# Patient Record
Sex: FEMALE | Race: WHITE | NOT HISPANIC OR LATINO | Employment: UNEMPLOYED | URBAN - NONMETROPOLITAN AREA
[De-identification: names, ages, dates, MRNs, and addresses within clinical notes are randomized per-mention and may not be internally consistent; named-entity substitution may affect disease eponyms.]

---

## 2017-12-26 ENCOUNTER — HOSPITAL ENCOUNTER (EMERGENCY)
Facility: HOSPITAL | Age: 32
Discharge: PSYCHIATRIC OR SUBSTANCE USE FACILITY | End: 2017-12-26
Attending: EMERGENCY MEDICINE | Admitting: EMERGENCY MEDICINE

## 2017-12-26 ENCOUNTER — HOSPITAL ENCOUNTER (INPATIENT)
Facility: HOSPITAL | Age: 32
LOS: 3 days | Discharge: HOME | End: 2017-12-29
Attending: PSYCHIATRY & NEUROLOGY | Admitting: PSYCHIATRY & NEUROLOGY

## 2017-12-26 ENCOUNTER — APPOINTMENT (OUTPATIENT)
Dept: GENERAL RADIOLOGY | Facility: HOSPITAL | Age: 32
End: 2017-12-26

## 2017-12-26 VITALS
HEART RATE: 100 BPM | SYSTOLIC BLOOD PRESSURE: 128 MMHG | DIASTOLIC BLOOD PRESSURE: 80 MMHG | OXYGEN SATURATION: 97 % | RESPIRATION RATE: 20 BRPM | HEIGHT: 65 IN | TEMPERATURE: 97.1 F | WEIGHT: 150 LBS | BODY MASS INDEX: 24.99 KG/M2

## 2017-12-26 DIAGNOSIS — D72.829 LEUKOCYTOSIS, UNSPECIFIED TYPE: ICD-10-CM

## 2017-12-26 DIAGNOSIS — E87.6 HYPOKALEMIA: ICD-10-CM

## 2017-12-26 DIAGNOSIS — F29 PSYCHOSIS, UNSPECIFIED PSYCHOSIS TYPE (HCC): Primary | ICD-10-CM

## 2017-12-26 PROBLEM — F22 PARANOID DELUSION: Status: ACTIVE | Noted: 2017-12-26

## 2017-12-26 LAB
6-ACETYL MORPHINE: NEGATIVE
ALBUMIN SERPL-MCNC: 4.8 G/DL (ref 3.5–5)
ALBUMIN/GLOB SERPL: 1.7 G/DL (ref 1.5–2.5)
ALP SERPL-CCNC: 80 U/L (ref 35–104)
ALT SERPL W P-5'-P-CCNC: 35 U/L (ref 10–36)
AMPHET+METHAMPHET UR QL: NEGATIVE
ANION GAP SERPL CALCULATED.3IONS-SCNC: 8.1 MMOL/L (ref 3.6–11.2)
AST SERPL-CCNC: 29 U/L (ref 10–30)
B-HCG UR QL: NEGATIVE
BACTERIA UR QL AUTO: ABNORMAL /HPF
BARBITURATES UR QL SCN: NEGATIVE
BASOPHILS # BLD AUTO: 0.02 10*3/MM3 (ref 0–0.3)
BASOPHILS NFR BLD AUTO: 0.1 % (ref 0–2)
BENZODIAZ UR QL SCN: NEGATIVE
BILIRUB SERPL-MCNC: 0.4 MG/DL (ref 0.2–1.8)
BILIRUB UR QL STRIP: NEGATIVE
BUN BLD-MCNC: 10 MG/DL (ref 7–21)
BUN/CREAT SERPL: 9.3 (ref 7–25)
BUPRENORPHINE SERPL-MCNC: NEGATIVE NG/ML
CALCIUM SPEC-SCNC: 9.5 MG/DL (ref 7.7–10)
CANNABINOIDS SERPL QL: NEGATIVE
CHLORIDE SERPL-SCNC: 113 MMOL/L (ref 99–112)
CLARITY UR: CLEAR
CO2 SERPL-SCNC: 23.9 MMOL/L (ref 24.3–31.9)
COCAINE UR QL: NEGATIVE
COLOR UR: YELLOW
CREAT BLD-MCNC: 1.07 MG/DL (ref 0.43–1.29)
DEPRECATED RDW RBC AUTO: 42.3 FL (ref 37–54)
EOSINOPHIL # BLD AUTO: 0.01 10*3/MM3 (ref 0–0.7)
EOSINOPHIL NFR BLD AUTO: 0.1 % (ref 0–5)
ERYTHROCYTE [DISTWIDTH] IN BLOOD BY AUTOMATED COUNT: 13 % (ref 11.5–14.5)
ETHANOL BLD-MCNC: <10 MG/DL
ETHANOL UR QL: <0.01 %
GFR SERPL CREATININE-BSD FRML MDRD: 59 ML/MIN/1.73
GLOBULIN UR ELPH-MCNC: 2.9 GM/DL
GLUCOSE BLD-MCNC: 106 MG/DL (ref 70–110)
GLUCOSE UR STRIP-MCNC: NEGATIVE MG/DL
HCT VFR BLD AUTO: 41.2 % (ref 37–47)
HGB BLD-MCNC: 13.7 G/DL (ref 12–16)
HGB UR QL STRIP.AUTO: ABNORMAL
HYALINE CASTS UR QL AUTO: ABNORMAL /LPF
IMM GRANULOCYTES # BLD: 0.06 10*3/MM3 (ref 0–0.03)
IMM GRANULOCYTES NFR BLD: 0.3 % (ref 0–0.5)
KETONES UR QL STRIP: NEGATIVE
LEUKOCYTE ESTERASE UR QL STRIP.AUTO: NEGATIVE
LYMPHOCYTES # BLD AUTO: 1.34 10*3/MM3 (ref 1–3)
LYMPHOCYTES NFR BLD AUTO: 7.6 % (ref 21–51)
MAGNESIUM SERPL-MCNC: 2.6 MG/DL (ref 1.7–2.6)
MCH RBC QN AUTO: 30 PG (ref 27–33)
MCHC RBC AUTO-ENTMCNC: 33.3 G/DL (ref 33–37)
MCV RBC AUTO: 90.4 FL (ref 80–94)
METHADONE UR QL SCN: NEGATIVE
MONOCYTES # BLD AUTO: 1.06 10*3/MM3 (ref 0.1–0.9)
MONOCYTES NFR BLD AUTO: 6 % (ref 0–10)
NEUTROPHILS # BLD AUTO: 15.16 10*3/MM3 (ref 1.4–6.5)
NEUTROPHILS NFR BLD AUTO: 85.9 % (ref 30–70)
NITRITE UR QL STRIP: NEGATIVE
OPIATES UR QL: NEGATIVE
OSMOLALITY SERPL CALC.SUM OF ELEC: 288.2 MOSM/KG (ref 273–305)
OXYCODONE UR QL SCN: NEGATIVE
PCP UR QL SCN: NEGATIVE
PH UR STRIP.AUTO: 7 [PH] (ref 5–8)
PLATELET # BLD AUTO: 324 10*3/MM3 (ref 130–400)
PMV BLD AUTO: 10.1 FL (ref 6–10)
POTASSIUM BLD-SCNC: 3 MMOL/L (ref 3.5–5.3)
POTASSIUM BLD-SCNC: 3.7 MMOL/L (ref 3.5–5.3)
PROT SERPL-MCNC: 7.7 G/DL (ref 6–8)
PROT UR QL STRIP: NEGATIVE
RBC # BLD AUTO: 4.56 10*6/MM3 (ref 4.2–5.4)
RBC # UR: ABNORMAL /HPF
REF LAB TEST METHOD: ABNORMAL
SODIUM BLD-SCNC: 145 MMOL/L (ref 135–153)
SP GR UR STRIP: 1.01 (ref 1–1.03)
SQUAMOUS #/AREA URNS HPF: ABNORMAL /HPF
UROBILINOGEN UR QL STRIP: ABNORMAL
WBC NRBC COR # BLD: 17.65 10*3/MM3 (ref 4.5–12.5)
WBC UR QL AUTO: ABNORMAL /HPF

## 2017-12-26 PROCEDURE — 94799 UNLISTED PULMONARY SVC/PX: CPT

## 2017-12-26 PROCEDURE — 81001 URINALYSIS AUTO W/SCOPE: CPT | Performed by: PHYSICIAN ASSISTANT

## 2017-12-26 PROCEDURE — 36415 COLL VENOUS BLD VENIPUNCTURE: CPT

## 2017-12-26 PROCEDURE — 80307 DRUG TEST PRSMV CHEM ANLYZR: CPT | Performed by: PHYSICIAN ASSISTANT

## 2017-12-26 PROCEDURE — 96372 THER/PROPH/DIAG INJ SC/IM: CPT

## 2017-12-26 PROCEDURE — 93010 ELECTROCARDIOGRAM REPORT: CPT | Performed by: INTERNAL MEDICINE

## 2017-12-26 PROCEDURE — 87040 BLOOD CULTURE FOR BACTERIA: CPT | Performed by: PHYSICIAN ASSISTANT

## 2017-12-26 PROCEDURE — 81025 URINE PREGNANCY TEST: CPT | Performed by: PHYSICIAN ASSISTANT

## 2017-12-26 PROCEDURE — 94640 AIRWAY INHALATION TREATMENT: CPT

## 2017-12-26 PROCEDURE — 80053 COMPREHEN METABOLIC PANEL: CPT | Performed by: PHYSICIAN ASSISTANT

## 2017-12-26 PROCEDURE — 71020 XR CHEST 2 VW: CPT | Performed by: RADIOLOGY

## 2017-12-26 PROCEDURE — 93005 ELECTROCARDIOGRAM TRACING: CPT | Performed by: PSYCHIATRY & NEUROLOGY

## 2017-12-26 PROCEDURE — 25010000002 ZIPRASIDONE MESYLATE PER 10 MG: Performed by: PHYSICIAN ASSISTANT

## 2017-12-26 PROCEDURE — 71020 HC CHEST PA AND LATERAL: CPT

## 2017-12-26 PROCEDURE — 84132 ASSAY OF SERUM POTASSIUM: CPT | Performed by: PHYSICIAN ASSISTANT

## 2017-12-26 PROCEDURE — 87086 URINE CULTURE/COLONY COUNT: CPT | Performed by: PHYSICIAN ASSISTANT

## 2017-12-26 PROCEDURE — 85025 COMPLETE CBC W/AUTO DIFF WBC: CPT | Performed by: PHYSICIAN ASSISTANT

## 2017-12-26 PROCEDURE — 99284 EMERGENCY DEPT VISIT MOD MDM: CPT

## 2017-12-26 PROCEDURE — 83735 ASSAY OF MAGNESIUM: CPT | Performed by: PHYSICIAN ASSISTANT

## 2017-12-26 RX ORDER — POTASSIUM CHLORIDE 20 MEQ/1
40 TABLET, EXTENDED RELEASE ORAL DAILY PRN
Status: DISCONTINUED | OUTPATIENT
Start: 2017-12-26 | End: 2017-12-29 | Stop reason: HOSPADM

## 2017-12-26 RX ORDER — LIDOCAINE 50 MG/G
1 PATCH TOPICAL EVERY 12 HOURS SCHEDULED
Status: CANCELLED | OUTPATIENT
Start: 2017-12-26

## 2017-12-26 RX ORDER — BENZONATATE 100 MG/1
100 CAPSULE ORAL 3 TIMES DAILY PRN
Status: DISCONTINUED | OUTPATIENT
Start: 2017-12-26 | End: 2017-12-29 | Stop reason: HOSPADM

## 2017-12-26 RX ORDER — OXYCODONE HYDROCHLORIDE 15 MG/1
15 TABLET ORAL EVERY 12 HOURS PRN
COMMUNITY
End: 2017-12-29 | Stop reason: HOSPADM

## 2017-12-26 RX ORDER — BENZTROPINE MESYLATE 1 MG/ML
0.5 INJECTION INTRAMUSCULAR; INTRAVENOUS DAILY PRN
Status: DISCONTINUED | OUTPATIENT
Start: 2017-12-26 | End: 2017-12-29 | Stop reason: HOSPADM

## 2017-12-26 RX ORDER — OXYCODONE HYDROCHLORIDE 15 MG/1
15 TABLET, FILM COATED, EXTENDED RELEASE ORAL EVERY 12 HOURS SCHEDULED
Status: ON HOLD | COMMUNITY
End: 2017-12-26

## 2017-12-26 RX ORDER — ALBUTEROL SULFATE 2.5 MG/3ML
2.5 SOLUTION RESPIRATORY (INHALATION) ONCE
Status: COMPLETED | OUTPATIENT
Start: 2017-12-26 | End: 2017-12-26

## 2017-12-26 RX ORDER — TOPIRAMATE 25 MG/1
50 TABLET ORAL DAILY
Status: CANCELLED | OUTPATIENT
Start: 2017-12-26

## 2017-12-26 RX ORDER — ZIPRASIDONE MESYLATE 20 MG/ML
10 INJECTION, POWDER, LYOPHILIZED, FOR SOLUTION INTRAMUSCULAR ONCE
Status: COMPLETED | OUTPATIENT
Start: 2017-12-26 | End: 2017-12-26

## 2017-12-26 RX ORDER — LIDOCAINE 50 MG/G
1 OINTMENT TOPICAL 2 TIMES DAILY PRN
COMMUNITY
End: 2017-12-29 | Stop reason: HOSPADM

## 2017-12-26 RX ORDER — WATER 1000 ML/1000ML
INJECTION, SOLUTION INTRAVENOUS
Status: COMPLETED
Start: 2017-12-26 | End: 2017-12-26

## 2017-12-26 RX ORDER — TOPIRAMATE 50 MG/1
50 TABLET, FILM COATED ORAL 2 TIMES DAILY
COMMUNITY
End: 2017-12-29 | Stop reason: HOSPADM

## 2017-12-26 RX ORDER — FAMOTIDINE 20 MG/1
20 TABLET, FILM COATED ORAL 2 TIMES DAILY PRN
Status: DISCONTINUED | OUTPATIENT
Start: 2017-12-26 | End: 2017-12-29 | Stop reason: HOSPADM

## 2017-12-26 RX ORDER — OLANZAPINE 5 MG/1
5 TABLET, ORALLY DISINTEGRATING ORAL EVERY 8 HOURS PRN
Status: DISCONTINUED | OUTPATIENT
Start: 2017-12-26 | End: 2017-12-29 | Stop reason: HOSPADM

## 2017-12-26 RX ORDER — LOPERAMIDE HYDROCHLORIDE 2 MG/1
2 CAPSULE ORAL 4 TIMES DAILY PRN
Status: DISCONTINUED | OUTPATIENT
Start: 2017-12-26 | End: 2017-12-29 | Stop reason: HOSPADM

## 2017-12-26 RX ORDER — NICOTINE 21 MG/24HR
1 PATCH, TRANSDERMAL 24 HOURS TRANSDERMAL DAILY
Status: DISCONTINUED | OUTPATIENT
Start: 2017-12-26 | End: 2017-12-29 | Stop reason: HOSPADM

## 2017-12-26 RX ORDER — TRAZODONE HYDROCHLORIDE 50 MG/1
50 TABLET ORAL NIGHTLY PRN
Status: DISCONTINUED | OUTPATIENT
Start: 2017-12-26 | End: 2017-12-29 | Stop reason: HOSPADM

## 2017-12-26 RX ORDER — CYCLOBENZAPRINE HCL 10 MG
10 TABLET ORAL 3 TIMES DAILY PRN
COMMUNITY
End: 2017-12-29 | Stop reason: HOSPADM

## 2017-12-26 RX ORDER — HYDROXYZINE 50 MG/1
50 TABLET, FILM COATED ORAL EVERY 6 HOURS PRN
Status: DISCONTINUED | OUTPATIENT
Start: 2017-12-26 | End: 2017-12-29 | Stop reason: HOSPADM

## 2017-12-26 RX ORDER — POTASSIUM CHLORIDE 20 MEQ/1
40 TABLET, EXTENDED RELEASE ORAL ONCE
Status: COMPLETED | OUTPATIENT
Start: 2017-12-26 | End: 2017-12-26

## 2017-12-26 RX ORDER — OXYCODONE HYDROCHLORIDE 15 MG/1
15 TABLET, FILM COATED, EXTENDED RELEASE ORAL EVERY 12 HOURS SCHEDULED
Status: CANCELLED | OUTPATIENT
Start: 2017-12-26

## 2017-12-26 RX ORDER — BENZTROPINE MESYLATE 1 MG/1
1 TABLET ORAL DAILY PRN
Status: DISCONTINUED | OUTPATIENT
Start: 2017-12-26 | End: 2017-12-29 | Stop reason: HOSPADM

## 2017-12-26 RX ORDER — CYCLOBENZAPRINE HCL 10 MG
10 TABLET ORAL 3 TIMES DAILY PRN
Status: CANCELLED | OUTPATIENT
Start: 2017-12-26

## 2017-12-26 RX ORDER — OXYCODONE HCL 10 MG/1
20 TABLET, FILM COATED, EXTENDED RELEASE ORAL EVERY 12 HOURS SCHEDULED
Status: CANCELLED | OUTPATIENT
Start: 2017-12-26

## 2017-12-26 RX ORDER — IBUPROFEN 600 MG/1
600 TABLET ORAL EVERY 6 HOURS PRN
Status: DISCONTINUED | OUTPATIENT
Start: 2017-12-26 | End: 2017-12-29 | Stop reason: HOSPADM

## 2017-12-26 RX ORDER — GABAPENTIN 400 MG/1
800 CAPSULE ORAL 3 TIMES DAILY PRN
Status: CANCELLED | OUTPATIENT
Start: 2017-12-26

## 2017-12-26 RX ADMIN — ZIPRASIDONE MESYLATE 10 MG: 20 INJECTION, POWDER, LYOPHILIZED, FOR SOLUTION INTRAMUSCULAR at 11:34

## 2017-12-26 RX ADMIN — WATER 1.3 ML: 1 INJECTION INTRAMUSCULAR; INTRAVENOUS; SUBCUTANEOUS at 11:34

## 2017-12-26 RX ADMIN — POTASSIUM CHLORIDE 40 MEQ: 1500 TABLET, EXTENDED RELEASE ORAL at 12:00

## 2017-12-26 RX ADMIN — NICOTINE 1 PATCH: 21 PATCH TRANSDERMAL at 14:46

## 2017-12-26 RX ADMIN — ALBUTEROL SULFATE 2.5 MG: 2.5 SOLUTION RESPIRATORY (INHALATION) at 10:21

## 2017-12-27 PROBLEM — F20.0 SCHIZOPHRENIA, PARANOID: Status: ACTIVE | Noted: 2017-12-26

## 2017-12-27 PROCEDURE — 99223 1ST HOSP IP/OBS HIGH 75: CPT | Performed by: PSYCHIATRY & NEUROLOGY

## 2017-12-27 RX ORDER — ARIPIPRAZOLE 10 MG/1
5 TABLET ORAL DAILY
Status: DISCONTINUED | OUTPATIENT
Start: 2017-12-27 | End: 2017-12-29 | Stop reason: HOSPADM

## 2017-12-27 RX ADMIN — ARIPIPRAZOLE 5 MG: 10 TABLET ORAL at 14:32

## 2017-12-27 RX ADMIN — IBUPROFEN 600 MG: 600 TABLET ORAL at 08:12

## 2017-12-27 RX ADMIN — IBUPROFEN 600 MG: 600 TABLET ORAL at 14:35

## 2017-12-27 RX ADMIN — NICOTINE 1 PATCH: 21 PATCH TRANSDERMAL at 08:12

## 2017-12-28 LAB
ALBUMIN SERPL-MCNC: 3.8 G/DL (ref 3.5–5)
ALBUMIN/GLOB SERPL: 1.5 G/DL (ref 1.5–2.5)
ALP SERPL-CCNC: 65 U/L (ref 35–104)
ALT SERPL W P-5'-P-CCNC: 25 U/L (ref 10–36)
ANION GAP SERPL CALCULATED.3IONS-SCNC: 1.9 MMOL/L (ref 3.6–11.2)
AST SERPL-CCNC: 23 U/L (ref 10–30)
BACTERIA SPEC AEROBE CULT: NORMAL
BASOPHILS # BLD AUTO: 0.02 10*3/MM3 (ref 0–0.3)
BASOPHILS NFR BLD AUTO: 0.2 % (ref 0–2)
BILIRUB SERPL-MCNC: 0.3 MG/DL (ref 0.2–1.8)
BUN BLD-MCNC: 7 MG/DL (ref 7–21)
BUN/CREAT SERPL: 10.1 (ref 7–25)
CALCIUM SPEC-SCNC: 8.9 MG/DL (ref 7.7–10)
CHLORIDE SERPL-SCNC: 113 MMOL/L (ref 99–112)
CHOLEST SERPL-MCNC: 126 MG/DL (ref 0–200)
CO2 SERPL-SCNC: 29.1 MMOL/L (ref 24.3–31.9)
CREAT BLD-MCNC: 0.69 MG/DL (ref 0.43–1.29)
DEPRECATED RDW RBC AUTO: 43.6 FL (ref 37–54)
EOSINOPHIL # BLD AUTO: 0.08 10*3/MM3 (ref 0–0.7)
EOSINOPHIL NFR BLD AUTO: 0.8 % (ref 0–5)
ERYTHROCYTE [DISTWIDTH] IN BLOOD BY AUTOMATED COUNT: 12.9 % (ref 11.5–14.5)
GFR SERPL CREATININE-BSD FRML MDRD: 97 ML/MIN/1.73
GLOBULIN UR ELPH-MCNC: 2.6 GM/DL
GLUCOSE BLD-MCNC: 96 MG/DL (ref 70–110)
HBA1C MFR BLD: 5 % (ref 4.5–5.7)
HCT VFR BLD AUTO: 37.9 % (ref 37–47)
HDLC SERPL-MCNC: 44 MG/DL (ref 60–100)
HGB BLD-MCNC: 12.2 G/DL (ref 12–16)
IMM GRANULOCYTES # BLD: 0.02 10*3/MM3 (ref 0–0.03)
IMM GRANULOCYTES NFR BLD: 0.2 % (ref 0–0.5)
LDLC SERPL CALC-MCNC: 70 MG/DL (ref 0–100)
LDLC/HDLC SERPL: 1.59 {RATIO}
LYMPHOCYTES # BLD AUTO: 2.67 10*3/MM3 (ref 1–3)
LYMPHOCYTES NFR BLD AUTO: 27.9 % (ref 21–51)
MCH RBC QN AUTO: 29.8 PG (ref 27–33)
MCHC RBC AUTO-ENTMCNC: 32.2 G/DL (ref 33–37)
MCV RBC AUTO: 92.4 FL (ref 80–94)
MONOCYTES # BLD AUTO: 0.9 10*3/MM3 (ref 0.1–0.9)
MONOCYTES NFR BLD AUTO: 9.4 % (ref 0–10)
NEUTROPHILS # BLD AUTO: 5.88 10*3/MM3 (ref 1.4–6.5)
NEUTROPHILS NFR BLD AUTO: 61.5 % (ref 30–70)
OSMOLALITY SERPL CALC.SUM OF ELEC: 284.7 MOSM/KG (ref 273–305)
PLATELET # BLD AUTO: 242 10*3/MM3 (ref 130–400)
PMV BLD AUTO: 9.9 FL (ref 6–10)
POTASSIUM BLD-SCNC: 3.8 MMOL/L (ref 3.5–5.3)
PROT SERPL-MCNC: 6.4 G/DL (ref 6–8)
RBC # BLD AUTO: 4.1 10*6/MM3 (ref 4.2–5.4)
SODIUM BLD-SCNC: 144 MMOL/L (ref 135–153)
T4 FREE SERPL-MCNC: 1.04 NG/DL (ref 0.89–1.76)
TRIGL SERPL-MCNC: 60 MG/DL (ref 0–150)
TSH SERPL DL<=0.05 MIU/L-ACNC: 0.51 MIU/ML (ref 0.55–4.78)
VLDLC SERPL-MCNC: 12 MG/DL
WBC NRBC COR # BLD: 9.57 10*3/MM3 (ref 4.5–12.5)

## 2017-12-28 PROCEDURE — 85025 COMPLETE CBC W/AUTO DIFF WBC: CPT | Performed by: PSYCHIATRY & NEUROLOGY

## 2017-12-28 PROCEDURE — 99232 SBSQ HOSP IP/OBS MODERATE 35: CPT | Performed by: PSYCHIATRY & NEUROLOGY

## 2017-12-28 PROCEDURE — 84443 ASSAY THYROID STIM HORMONE: CPT | Performed by: PSYCHIATRY & NEUROLOGY

## 2017-12-28 PROCEDURE — 83036 HEMOGLOBIN GLYCOSYLATED A1C: CPT | Performed by: PSYCHIATRY & NEUROLOGY

## 2017-12-28 PROCEDURE — 84439 ASSAY OF FREE THYROXINE: CPT | Performed by: PSYCHIATRY & NEUROLOGY

## 2017-12-28 PROCEDURE — 80061 LIPID PANEL: CPT | Performed by: PSYCHIATRY & NEUROLOGY

## 2017-12-28 PROCEDURE — 80053 COMPREHEN METABOLIC PANEL: CPT | Performed by: PSYCHIATRY & NEUROLOGY

## 2017-12-28 RX ADMIN — NICOTINE 1 PATCH: 21 PATCH TRANSDERMAL at 08:32

## 2017-12-28 RX ADMIN — IBUPROFEN 600 MG: 600 TABLET ORAL at 20:32

## 2017-12-28 RX ADMIN — ARIPIPRAZOLE 0.5 MG: 10 TABLET ORAL at 08:31

## 2017-12-28 RX ADMIN — IBUPROFEN 600 MG: 600 TABLET ORAL at 09:10

## 2017-12-29 VITALS
TEMPERATURE: 97.6 F | BODY MASS INDEX: 26.29 KG/M2 | DIASTOLIC BLOOD PRESSURE: 95 MMHG | WEIGHT: 157.8 LBS | RESPIRATION RATE: 18 BRPM | SYSTOLIC BLOOD PRESSURE: 141 MMHG | HEART RATE: 100 BPM | OXYGEN SATURATION: 98 % | HEIGHT: 65 IN

## 2017-12-29 PROCEDURE — 99239 HOSP IP/OBS DSCHRG MGMT >30: CPT | Performed by: PSYCHIATRY & NEUROLOGY

## 2017-12-29 RX ORDER — ARIPIPRAZOLE 5 MG/1
5 TABLET ORAL DAILY
Qty: 30 TABLET | Refills: 0 | Status: ON HOLD | OUTPATIENT
Start: 2017-12-30 | End: 2018-06-09

## 2017-12-29 RX ADMIN — NICOTINE 1 PATCH: 21 PATCH TRANSDERMAL at 08:13

## 2017-12-29 RX ADMIN — ARIPIPRAZOLE 5 MG: 10 TABLET ORAL at 08:12

## 2017-12-29 RX ADMIN — IBUPROFEN 600 MG: 600 TABLET ORAL at 08:15

## 2017-12-31 LAB — BACTERIA SPEC AEROBE CULT: NORMAL

## 2018-06-09 ENCOUNTER — HOSPITAL ENCOUNTER (EMERGENCY)
Facility: HOSPITAL | Age: 33
Discharge: STILL ADMITTED OR ADMINISTRATIVE | End: 2018-06-09
Attending: EMERGENCY MEDICINE

## 2018-06-09 ENCOUNTER — HOSPITAL ENCOUNTER (INPATIENT)
Facility: HOSPITAL | Age: 33
LOS: 6 days | Discharge: HOME | End: 2018-06-15
Attending: PSYCHIATRY & NEUROLOGY | Admitting: PSYCHIATRY & NEUROLOGY

## 2018-06-09 VITALS
DIASTOLIC BLOOD PRESSURE: 81 MMHG | WEIGHT: 140 LBS | RESPIRATION RATE: 18 BRPM | OXYGEN SATURATION: 100 % | HEART RATE: 95 BPM | SYSTOLIC BLOOD PRESSURE: 127 MMHG | BODY MASS INDEX: 22.5 KG/M2 | HEIGHT: 66 IN | TEMPERATURE: 99.7 F

## 2018-06-09 DIAGNOSIS — F41.1 GENERALIZED ANXIETY DISORDER: ICD-10-CM

## 2018-06-09 DIAGNOSIS — R44.3 HALLUCINATIONS: Primary | ICD-10-CM

## 2018-06-09 PROBLEM — F33.3 MDD (MAJOR DEPRESSIVE DISORDER), RECURRENT, SEVERE, WITH PSYCHOSIS: Status: ACTIVE | Noted: 2018-06-09

## 2018-06-09 LAB
6-ACETYL MORPHINE: NEGATIVE
AMPHET+METHAMPHET UR QL: NEGATIVE
B-HCG UR QL: NEGATIVE
BARBITURATES UR QL SCN: NEGATIVE
BASOPHILS # BLD AUTO: 0.02 10*3/MM3 (ref 0–0.3)
BASOPHILS NFR BLD AUTO: 0.1 % (ref 0–2)
BENZODIAZ UR QL SCN: NEGATIVE
BILIRUB UR QL STRIP: NEGATIVE
BUPRENORPHINE SERPL-MCNC: NEGATIVE NG/ML
CANNABINOIDS SERPL QL: NEGATIVE
CLARITY UR: CLEAR
COCAINE UR QL: NEGATIVE
COLOR UR: YELLOW
DEPRECATED RDW RBC AUTO: 40.5 FL (ref 37–54)
EOSINOPHIL # BLD AUTO: 0.02 10*3/MM3 (ref 0–0.7)
EOSINOPHIL NFR BLD AUTO: 0.1 % (ref 0–5)
ERYTHROCYTE [DISTWIDTH] IN BLOOD BY AUTOMATED COUNT: 12.5 % (ref 11.5–14.5)
ETHANOL BLD-MCNC: <10 MG/DL
ETHANOL UR QL: <0.01 %
GLUCOSE UR STRIP-MCNC: NEGATIVE MG/DL
HCT VFR BLD AUTO: 39.2 % (ref 37–47)
HGB BLD-MCNC: 13.3 G/DL (ref 12–16)
HGB UR QL STRIP.AUTO: NEGATIVE
IMM GRANULOCYTES # BLD: 0.04 10*3/MM3 (ref 0–0.03)
IMM GRANULOCYTES NFR BLD: 0.3 % (ref 0–0.5)
KETONES UR QL STRIP: NEGATIVE
LEUKOCYTE ESTERASE UR QL STRIP.AUTO: NEGATIVE
LYMPHOCYTES # BLD AUTO: 3.02 10*3/MM3 (ref 1–3)
LYMPHOCYTES NFR BLD AUTO: 20.5 % (ref 21–51)
MCH RBC QN AUTO: 31.1 PG (ref 27–33)
MCHC RBC AUTO-ENTMCNC: 33.9 G/DL (ref 33–37)
MCV RBC AUTO: 91.8 FL (ref 80–94)
METHADONE UR QL SCN: NEGATIVE
MONOCYTES # BLD AUTO: 1.03 10*3/MM3 (ref 0.1–0.9)
MONOCYTES NFR BLD AUTO: 7 % (ref 0–10)
NEUTROPHILS # BLD AUTO: 10.57 10*3/MM3 (ref 1.4–6.5)
NEUTROPHILS NFR BLD AUTO: 72 % (ref 30–70)
NITRITE UR QL STRIP: NEGATIVE
OPIATES UR QL: NEGATIVE
OXYCODONE UR QL SCN: POSITIVE
PCP UR QL SCN: NEGATIVE
PH UR STRIP.AUTO: 7 [PH] (ref 5–8)
PLATELET # BLD AUTO: 312 10*3/MM3 (ref 130–400)
PMV BLD AUTO: 10 FL (ref 6–10)
PROT UR QL STRIP: NEGATIVE
RBC # BLD AUTO: 4.27 10*6/MM3 (ref 4.2–5.4)
SP GR UR STRIP: <=1.005 (ref 1–1.03)
UROBILINOGEN UR QL STRIP: NORMAL
WBC NRBC COR # BLD: 14.7 10*3/MM3 (ref 4.5–12.5)

## 2018-06-09 PROCEDURE — 80307 DRUG TEST PRSMV CHEM ANLYZR: CPT | Performed by: EMERGENCY MEDICINE

## 2018-06-09 PROCEDURE — 93005 ELECTROCARDIOGRAM TRACING: CPT | Performed by: PSYCHIATRY & NEUROLOGY

## 2018-06-09 PROCEDURE — 99223 1ST HOSP IP/OBS HIGH 75: CPT | Performed by: PSYCHIATRY & NEUROLOGY

## 2018-06-09 PROCEDURE — 80307 DRUG TEST PRSMV CHEM ANLYZR: CPT | Performed by: NURSE PRACTITIONER

## 2018-06-09 PROCEDURE — 85025 COMPLETE CBC W/AUTO DIFF WBC: CPT | Performed by: NURSE PRACTITIONER

## 2018-06-09 PROCEDURE — 93010 ELECTROCARDIOGRAM REPORT: CPT | Performed by: INTERNAL MEDICINE

## 2018-06-09 PROCEDURE — 81025 URINE PREGNANCY TEST: CPT | Performed by: NURSE PRACTITIONER

## 2018-06-09 PROCEDURE — 81003 URINALYSIS AUTO W/O SCOPE: CPT | Performed by: NURSE PRACTITIONER

## 2018-06-09 RX ORDER — BENZTROPINE MESYLATE 1 MG/ML
0.5 INJECTION INTRAMUSCULAR; INTRAVENOUS DAILY PRN
Status: DISCONTINUED | OUTPATIENT
Start: 2018-06-09 | End: 2018-06-15 | Stop reason: HOSPADM

## 2018-06-09 RX ORDER — HYDROXYZINE 50 MG/1
50 TABLET, FILM COATED ORAL EVERY 6 HOURS PRN
Status: DISCONTINUED | OUTPATIENT
Start: 2018-06-09 | End: 2018-06-15 | Stop reason: HOSPADM

## 2018-06-09 RX ORDER — MIRTAZAPINE 15 MG/1
15 TABLET, FILM COATED ORAL NIGHTLY
COMMUNITY
End: 2018-06-15 | Stop reason: HOSPADM

## 2018-06-09 RX ORDER — MIRTAZAPINE 15 MG/1
15 TABLET, FILM COATED ORAL NIGHTLY
Status: DISCONTINUED | OUTPATIENT
Start: 2018-06-09 | End: 2018-06-13

## 2018-06-09 RX ORDER — TRAZODONE HYDROCHLORIDE 50 MG/1
50 TABLET ORAL NIGHTLY PRN
Status: DISCONTINUED | OUTPATIENT
Start: 2018-06-09 | End: 2018-06-15 | Stop reason: HOSPADM

## 2018-06-09 RX ORDER — TRIFLUOPERAZINE HYDROCHLORIDE 5 MG/1
5 TABLET, FILM COATED ORAL EVERY MORNING
Status: DISCONTINUED | OUTPATIENT
Start: 2018-06-10 | End: 2018-06-09

## 2018-06-09 RX ORDER — BENZTROPINE MESYLATE 1 MG/1
1 TABLET ORAL DAILY PRN
Status: DISCONTINUED | OUTPATIENT
Start: 2018-06-09 | End: 2018-06-15 | Stop reason: HOSPADM

## 2018-06-09 RX ORDER — QUETIAPINE FUMARATE 25 MG/1
25 TABLET, FILM COATED ORAL NIGHTLY
Status: DISCONTINUED | OUTPATIENT
Start: 2018-06-09 | End: 2018-06-09

## 2018-06-09 RX ORDER — OXYCODONE HYDROCHLORIDE 15 MG/1
15 TABLET ORAL EVERY 6 HOURS PRN
Status: CANCELLED | OUTPATIENT
Start: 2018-06-09

## 2018-06-09 RX ORDER — TRIFLUOPERAZINE HYDROCHLORIDE 1 MG/1
1 TABLET, FILM COATED ORAL 2 TIMES DAILY
Status: ON HOLD | COMMUNITY
End: 2018-06-09

## 2018-06-09 RX ORDER — NICOTINE 21 MG/24HR
1 PATCH, TRANSDERMAL 24 HOURS TRANSDERMAL EVERY 24 HOURS
Status: DISCONTINUED | OUTPATIENT
Start: 2018-06-09 | End: 2018-06-15 | Stop reason: HOSPADM

## 2018-06-09 RX ORDER — FAMOTIDINE 20 MG/1
20 TABLET, FILM COATED ORAL 2 TIMES DAILY PRN
Status: DISCONTINUED | OUTPATIENT
Start: 2018-06-09 | End: 2018-06-15 | Stop reason: HOSPADM

## 2018-06-09 RX ORDER — TRIFLUOPERAZINE HYDROCHLORIDE 5 MG/1
5 TABLET, FILM COATED ORAL EVERY MORNING
COMMUNITY
End: 2018-06-15 | Stop reason: HOSPADM

## 2018-06-09 RX ORDER — OXYCODONE HYDROCHLORIDE 15 MG/1
15 TABLET ORAL EVERY 6 HOURS PRN
COMMUNITY
End: 2018-06-15 | Stop reason: HOSPADM

## 2018-06-09 RX ORDER — ONDANSETRON 4 MG/1
4 TABLET, FILM COATED ORAL EVERY 6 HOURS PRN
Status: DISCONTINUED | OUTPATIENT
Start: 2018-06-09 | End: 2018-06-15 | Stop reason: HOSPADM

## 2018-06-09 RX ORDER — QUETIAPINE FUMARATE 100 MG/1
100 TABLET, FILM COATED ORAL NIGHTLY
Status: DISCONTINUED | OUTPATIENT
Start: 2018-06-09 | End: 2018-06-09

## 2018-06-09 RX ORDER — BENZONATATE 100 MG/1
100 CAPSULE ORAL 3 TIMES DAILY PRN
Status: DISCONTINUED | OUTPATIENT
Start: 2018-06-09 | End: 2018-06-15 | Stop reason: HOSPADM

## 2018-06-09 RX ORDER — ECHINACEA PURPUREA EXTRACT 125 MG
2 TABLET ORAL AS NEEDED
Status: DISCONTINUED | OUTPATIENT
Start: 2018-06-09 | End: 2018-06-15 | Stop reason: HOSPADM

## 2018-06-09 RX ORDER — ALUMINA, MAGNESIA, AND SIMETHICONE 2400; 2400; 240 MG/30ML; MG/30ML; MG/30ML
15 SUSPENSION ORAL EVERY 6 HOURS PRN
Status: DISCONTINUED | OUTPATIENT
Start: 2018-06-09 | End: 2018-06-15 | Stop reason: HOSPADM

## 2018-06-09 RX ORDER — QUETIAPINE FUMARATE 25 MG/1
25 TABLET, FILM COATED ORAL NIGHTLY
COMMUNITY
End: 2018-06-15 | Stop reason: HOSPADM

## 2018-06-09 RX ORDER — ACETAMINOPHEN 325 MG/1
650 TABLET ORAL EVERY 4 HOURS PRN
Status: DISCONTINUED | OUTPATIENT
Start: 2018-06-09 | End: 2018-06-15 | Stop reason: HOSPADM

## 2018-06-09 RX ORDER — QUETIAPINE FUMARATE 100 MG/1
100 TABLET, FILM COATED ORAL NIGHTLY
Status: DISCONTINUED | OUTPATIENT
Start: 2018-06-09 | End: 2018-06-15 | Stop reason: HOSPADM

## 2018-06-09 RX ORDER — OXYCODONE HCL 10 MG/1
20 TABLET, FILM COATED, EXTENDED RELEASE ORAL EVERY 12 HOURS SCHEDULED
Status: CANCELLED | OUTPATIENT
Start: 2018-06-09

## 2018-06-09 RX ADMIN — ACETAMINOPHEN 650 MG: 325 TABLET, FILM COATED ORAL at 20:33

## 2018-06-09 RX ADMIN — HYDROXYZINE HYDROCHLORIDE 50 MG: 50 TABLET ORAL at 09:18

## 2018-06-09 RX ADMIN — MIRTAZAPINE 15 MG: 15 TABLET, FILM COATED ORAL at 20:30

## 2018-06-09 RX ADMIN — NICOTINE 1 PATCH: 21 PATCH TRANSDERMAL at 09:18

## 2018-06-09 NOTE — PLAN OF CARE
Problem: Patient Care Overview  Goal: Plan of Care Review  Outcome: Ongoing (interventions implemented as appropriate)    Goal: Individualization and Mutuality  Outcome: Ongoing (interventions implemented as appropriate)   06/09/18 1615   Personal Strengths/Vulnerabilities   Patient Personal Strengths motivated for treatment   Patient Vulnerabilities psychosis     Goal: Discharge Needs Assessment  Outcome: Ongoing (interventions implemented as appropriate)   06/09/18 1621   Discharge Needs Assessment   Readmission Within the Last 30 Days no previous admission in last 30 days   Concerns to be Addressed cognitive/perceptual;mental health   Patient/Family Anticipates Transition to home   Patient/Family Anticipated Services at Transition none   Transportation Anticipated family or friend will provide   Patient's Choice of Community Agency(s) not consenting today   Current Discharge Risk psychiatric illness   Discharge Coordination/Progress Patient has insurance for medication and reports no issues with transportation.   Discharge Needs Assessment,    Outpatient/Agency/Support Group Needs outpatient medication management;outpatient counseling;outpatient psychiatric care (specify)   Anticipated Discharge Disposition home or self-care      DATA: Met with patient initially to complete initial assessment, social history, integrated summary, review care planning and disposition discussion. Patient is a 32 year old disabled female [place].  Patient has a history of admissions last being in December 2017.  Patient presents with psychosis, delusions and paranoia.  Patient reports a history of noncompliance with outpatient treatment.  Patient reports that her car is broke down and she does not feel safe to leave her home as she knows someone is coming into her home and moving around her belongings.  She discussed how people are being mind controlled to have a certain opinion of her so she cannot look at her  phone or facebook.  She reports it is even happening on the television.  Patient is not consenting today for family involvement or after care.    ASSESSMENT:  Patient presents with psychosis, delusions and paranoia.    Patient reports acute increase in depression and anxiety.  Patient is a danger to self and requires further hospitalization for stabilization of symptoms.    PLAN:  Patient will continue stabilization.  Patient will engage in individual and group therapy to address coping and review crisis safety planning as well as appropriate disposition.  Patient is verbalizing a plan currently to return home upon stabilization.  Patient is not consenting today for aftercare.

## 2018-06-09 NOTE — PLAN OF CARE
"Problem: Patient Care Overview  Goal: Plan of Care Review  Outcome: Ongoing (interventions implemented as appropriate)   06/1985   Coping/Psychosocial   Plan of Care Reviewed With patient   Coping/Psychosocial   Patient Agreement with Plan of Care agrees   Plan of Care Review   Progress no change   OTHER   Outcome Summary Patient was very confused and disoriented this AM during assessment. Patient smiling inappropriately at times, patient in floor this AM picking objects out of the air stating \"I know he is here, I smell him. Death is coming for me.\" Patient reoriented to surroundings but patient is extremely paranoid and suspicious of staff and other patients. Patient reports seeing and hearing the devil and reports she smeels him around her. Patient denies any thoughts to harm self or others. Patient more alert this afternoon and knew where she was but reported she is here for a panic attack. Patient was noted \"praying away the demons\" and reports \"I feel things also.\" Will continue to monitor closely.     Goal: Individualization and Mutuality  Outcome: Ongoing (interventions implemented as appropriate)    Goal: Discharge Needs Assessment  Outcome: Ongoing (interventions implemented as appropriate)    Goal: Interprofessional Rounds/Family Conf  Outcome: Ongoing (interventions implemented as appropriate)      Problem: Overarching Goals (Adult)  Goal: Adheres to Safety Considerations for Self and Others  Outcome: Ongoing (interventions implemented as appropriate)    Goal: Optimized Coping Skills in Response to Life Stressors  Outcome: Ongoing (interventions implemented as appropriate)    Goal: Develops/Participates in Therapeutic [place] to Support Successful Transition  Outcome: Ongoing (interventions implemented as appropriate)        "

## 2018-06-09 NOTE — ED NOTES
"Pt presents to the ER tearful, withdrawn, and noted to be rocking back and forth nervously. Pt family reports that Pt has not had her psych medications in one month and today Pt began having severe panic attacks. Pt presents to the ER whispering \"I'm scared,\" repeatedly. Pt family states that Pt takes remeron and stelazine and Pt did not fill this month's prescription.     Vero Trujillo RN  06/09/18 0240    "

## 2018-06-09 NOTE — ED PROVIDER NOTES
Subjective     History provided by:  Patient   used: No    Mental Health Problem   Presenting symptoms: bizarre behavior, delusional, depression, hallucinations and paranoid behavior    Degree of incapacity (severity):  Moderate  Onset quality:  Gradual  Timing:  Intermittent  Progression:  Waxing and waning  Chronicity:  Recurrent  Context: noncompliance and stressful life event    Relieved by:  Nothing  Worsened by:  Nothing  Ineffective treatments:  None tried  Associated symptoms: anxiety, feelings of worthlessness, insomnia and irritability        Review of Systems   Constitutional: Positive for irritability.   HENT: Negative.    Eyes: Negative.    Respiratory: Negative.    Endocrine: Negative.    Genitourinary: Negative.    Musculoskeletal: Negative.    Skin: Negative.    Allergic/Immunologic: Negative.    Neurological: Negative.    Hematological: Negative.    Psychiatric/Behavioral: Positive for hallucinations and paranoia. The patient is nervous/anxious and has insomnia.        Past Medical History:   Diagnosis Date   • Anxiety    • Arthritis    • Bronchitis    • Hypertension    • Panic disorder    • Psychiatric illness    • Schizoaffective disorder    • Schizophrenia, paranoid        Allergies   Allergen Reactions   • Elavil [Amitriptyline] Nausea Only       Past Surgical History:   Procedure Laterality Date   • WISDOM TOOTH EXTRACTION         Family History   Problem Relation Age of Onset   • Arthritis Mother    • Stroke Father    • COPD Brother    • Diabetes Maternal Grandmother    • COPD Maternal Grandfather    • Heart disease Maternal Grandfather    • Stroke Maternal Grandfather    • ADD / ADHD Neg Hx    • Anxiety disorder Neg Hx    • Bipolar disorder Neg Hx    • Alcohol abuse Neg Hx        Social History     Social History   • Marital status: Single     Social History Main Topics   • Smoking status: Current Every Day Smoker     Packs/day: 1.00     Types: Cigarettes   • Smokeless  "tobacco: Never Used   • Alcohol use No   • Drug use: Yes     Types: Oxycodone   • Sexual activity: No      Comment: reports she hasn't been in \"forever.\"      Other Topics Concern   • Not on file           Objective   Physical Exam   Constitutional: She is oriented to person, place, and time. She appears well-developed and well-nourished.   HENT:   Head: Normocephalic.   Eyes: EOM are normal. Pupils are equal, round, and reactive to light.   Neck: Normal range of motion. Neck supple.   Cardiovascular: Normal rate, regular rhythm, normal heart sounds and intact distal pulses.    Pulmonary/Chest: Effort normal and breath sounds normal.   Abdominal: Soft. Bowel sounds are normal.   Musculoskeletal: Normal range of motion.   Neurological: She is alert and oriented to person, place, and time.   Skin: Skin is warm and dry. Capillary refill takes less than 2 seconds.   Psychiatric:   Patient anxious and tearful, rocking back and forth on stretcher. Patient keeps repeating, \"im so scared, im so scared.\"   Nursing note and vitals reviewed.      Procedures           ED Course                  MDM  Number of Diagnoses or Management Options  Generalized anxiety disorder: new and requires workup  Hallucinations: new and requires workup     Amount and/or Complexity of Data Reviewed  Clinical lab tests: ordered and reviewed  Tests in the medicine section of CPT®: reviewed and ordered    Risk of Complications, Morbidity, and/or Mortality  Presenting problems: low  Diagnostic procedures: low  Management options: low    Patient Progress  Patient progress: stable        Final diagnoses:   Hallucinations   Generalized anxiety disorder            Andrew Minaya, APRN  06/09/18 0331    "

## 2018-06-09 NOTE — H&P
"INITIAL PSYCHIATRIC HISTORY & PHYSICAL    Patient Identification:  Name:   Cintia Issa  Age:   32 y.o.  Sex:   female  :   1985  MRN:   [id number]  Visit Number:   [id number]  Primary Care Physician:   ROBIN Hernandes    SUBJECTIVE    CC: Paranoid, delusional, bizarre behavior    HPI: Cintia Issa is a 32 y.o. female who was admitted on 2018 with complaints of bizarre, paranoid, delusional behavior.  Patient presented to [facility] displaying bizarre, paranoid and hallucinations.  Patient has history of previous inpatient psychiatric hospitalization at this facility on 2017-2017.diagnosis of Delusional disorder, r/o  Scizophrenia,unspecified, Opiate dependence ,uncomplicated,iatrogenic. Benzodiazepine dependence, uncomplicated iatrogenic, Hypertension, Chronic back pain. She also has noted history of MDD, rec, mod and reports history of fibromyalgia. Upon unit patient continues to display paranoia,  she is remains  apprehensive, restless, guarded,smiles inappropriately, displays disorganized thought and is  Preoccupied. She reports people are out to get her, feels as if people are in vents trying to look at her, attempting to get  to hurt her, and is constantly saying something is \"not right.\"  The patient is a poor historian and cannot remember when the last time she took her medicine, when asked if she took any the last month she states \"I really don't know\" patient says her sleep is very poor, she is very emotional during assessment and rates her anxiety and depression a 10 out of 10 with 10 being the worst.  Patient states that she is having visual and auditory hallucinations of angels and demons, and that she can even smell them.  Patient continues to display paranoia by swatting at the floors and staring at different parts of the walls. Patient reports lately she's experienced increased anxiety, depression, low mood, anxiety, excessive worry,  isolation,  " anhedonia, low self,  feeling on edge,  Historically, she has history of periodic “Panic” episodes including shortness of breath, fear of losing control, urged to escape the situation, and a sense of impending doom   Patient reports a long history of depression and anxiety going back to her early teenage years.She was admitted to the Adult Psychiatric Unit for safety and further stabilization.     Abnormal ECG.  Schizoaffective disorder, schizophrenia, panic disorder, anxiety    PAST PSYCHIATRIC HX: Patient has 2 other previous psychiatric admissions at [facility], with the last being on 12/27/2017-12/29/2017.  A review of the records reveals she was started on Abilify at that time. She reports she has been treated with multiple medications over the years with mixed results she denies history of suicidal ideation or suicidal behavior.  She has a history of Lexaproprescription opiate in the past and history of pain clinic  She reports a significant increase following the death of her father.     SUBSTANCE USE HX: UDS was positive for oxycodone.  Patient has a substance abuse history and no reported EtOH history.    SOCIAL HX: Patient was born and raised in [place].  She is single with no children.  Patient lives alone but recently moved in with mom this week because of her recent severe panic attacks.  She has one sibling.  She has a high school education and draws disability.    Past Medical History:   Diagnosis Date   • Anxiety    • Arthritis    • Bronchitis    • Hypertension    • MDD (major depressive disorder), recurrent, severe, with psychosis 6/9/2018   • Panic disorder    • Psychiatric illness    • Schizoaffective disorder    • Schizophrenia, paranoid        Past Surgical History:   Procedure Laterality Date   • WISDOM TOOTH EXTRACTION         Family History   Problem Relation Age of Onset   • Arthritis Mother    • Stroke Father    • COPD Brother    • Diabetes Maternal  Grandmother    • COPD Maternal Grandfather    • Heart disease Maternal Grandfather    • Stroke Maternal Grandfather    • ADD / ADHD Neg Hx    • Anxiety disorder Neg Hx    • Bipolar disorder Neg Hx    • Alcohol abuse Neg Hx          Prescriptions Prior to Admission   Medication Sig Dispense Refill Last Dose   • mirtazapine (REMERON) 15 MG tablet Take 15 mg by mouth Every Night.   Unknown at Unknown time   • oxyCODONE (ROXICODONE) 15 MG immediate release tablet Take 15 mg by mouth Every 6 (Six) Hours As Needed for Moderate Pain .   Unknown at Unknown time   • oxyMORphone ER (OPANA ER) 10 MG tablet extended-release 12 hour 12 hr tablet Take 10 mg by mouth Every 12 (Twelve) Hours.   Unknown at Unknown time   • QUEtiapine (SEROquel) 25 MG tablet Take 25 mg by mouth Every Night.   Unknown at Unknown time   • trifluoperazine (STELAZINE) 5 MG tablet Take 5 mg by mouth Every Morning.   Unknown at Unknown time       Reviewed available past medical and psychiatric records.    ALLERGIES:  Elavil [amitriptyline]    Temp:  [97.4 °F (36.3 °C)-99.7 °F (37.6 °C)] 97.4 °F (36.3 °C)  Heart Rate:  [] 91  Resp:  [18] 18  BP: (108-161)/(74-95) 125/81    REVIEW OF SYSTEMS:  Review of Systems   Constitutional: Positive for activity change, appetite change and fatigue.   HENT: Negative.    Eyes: Negative.    Respiratory: Negative.    Cardiovascular: Negative.    Gastrointestinal: Negative.    Endocrine: Negative.    Genitourinary: Negative.    Musculoskeletal: Negative.    Skin: Negative.    Allergic/Immunologic: Negative.    Neurological: Negative.    Hematological: Negative.    All other systems reviewed and are negative.     See HPI for psychiatric ROS  OBJECTIVE    PHYSICAL EXAM:  Physical Exam   Constitutional: She is oriented to person, place, and time. She appears well-developed and well-nourished.   HENT:   Head: Normocephalic and atraumatic.   Nose: Nose normal.   Mouth/Throat: Oropharynx is clear and moist.   Eyes:  Conjunctivae and EOM are normal. Pupils are equal, round, and reactive to light. Right eye exhibits no discharge. Left eye exhibits no discharge. No scleral icterus.   Neck: Normal range of motion. Neck supple. No JVD present. No thyromegaly present.   Cardiovascular: Normal rate, regular rhythm and normal heart sounds.  Exam reveals no gallop and no friction rub.    No murmur heard.  Pulmonary/Chest: Effort normal and breath sounds normal. No respiratory distress. She has no wheezes.   Abdominal: Soft. Bowel sounds are normal. She exhibits no distension and no mass. There is no rebound and no guarding.   Musculoskeletal: Normal range of motion. She exhibits no edema, tenderness or deformity.   Lymphadenopathy:     She has no cervical adenopathy.   Neurological: She is alert and oriented to person, place, and time. No cranial nerve deficit. She exhibits normal muscle tone. Coordination normal.   Skin: Skin is warm and dry. No rash noted. No erythema. No pallor.   Nursing note and vitals reviewed.      MENTAL STATUS EXAM:               Hygiene:   fair  Cooperation:  Suspicious  Eye Contact:  Poor  Psychomotor Behavior:  Aggitated  Affect:  Restricted  Hopelessness: Denies  Speech:  Pressured  Thought Progress:  Disorganized  Thought Content:  Bizarre  Suicidal:  None  Homicidal:  None  Hallucinations:  Auditory and Visual  Delusion:  Paranoid  Memory:  Deficits  Orientation:  Unable to evaluate  Reliability:  poor  Insight:  Poor  Judgement:  Poor  Impulse Control:  Poor  Physical/Medical Issues:  No       Imaging Results (last 24 hours)     ** No results found for the last 24 hours. **           ECG/EMG Results (most recent)     Procedure Component Value Units Date/Time    ECG 12 Lead [[id number]] Collected:  06/09/18 0823     Updated:  06/09/18 0830    Narrative:       Test Reason : Potential adverse reaction to medications.  Blood Pressure : **/** mmHG  Vent. Rate : 085 BPM     Atrial Rate : 085 BPM     P-R Int :  128 ms          QRS Dur : 076 ms      QT Int : 392 ms       P-R-T Axes : 053 057 063 degrees     QTc Int : 466 ms    Normal sinus rhythm  T wave abnormality, consider anterior ischemia  Prolonged QT  Abnormal ECG  When compared with ECG of 26-DEC-2017 16:46,  No significant change was found    Referred By:  FAUSTINO           Confirmed By:            Lab Results   Component Value Date    GLUCOSE 96 12/28/2017    BUN 7 12/28/2017    CREATININE 0.69 12/28/2017    EGFRIFNONA 97 12/28/2017    BCR 10.1 12/28/2017    CO2 29.1 12/28/2017    CALCIUM 8.9 12/28/2017    ALBUMIN 3.80 12/28/2017    LABIL2 1.5 12/28/2017    AST 23 12/28/2017    ALT 25 12/28/2017       Lab Results   Component Value Date    WBC 14.70 (H) 06/09/2018    HGB 13.3 06/09/2018    HCT 39.2 06/09/2018    MCV 91.8 06/09/2018     06/09/2018       Pain Management Panel     Pain Management Panel Latest Ref Rng & Units 6/9/2018 12/26/2017    AMPHETAMINES SCREEN, URINE Negative Negative Negative    BARBITURATES SCREEN Negative Negative Negative    BENZODIAZEPINE SCREEN, URINE Negative Negative Negative    BUPRENORPHINE Negative Negative Negative    COCAINE SCREEN, URINE Negative Negative Negative    METHADONE SCREEN, URINE Negative Negative Negative          Brief Urine Lab Results  (Last result in the past 365 days)      Color   Clarity   Blood   Leuk Est   Nitrite   Protein   CREAT   Urine HCG        06/09/18 0310               Negative     06/09/18 0310 Yellow Clear Negative Negative Negative Negative               Reviewed labs and studies done with this admission.       ASSESSMENT & PLAN:      Patient Active Problem List   Diagnosis Code   • Fibromyalgia M79.7   • Essential hypertension I10   • Schizophrenia, paranoid F20.0   • MDD (major depressive disorder), recurrent, severe, with psychosis F33.3         The patient has been admitted for safety and stabilization.  Patient will be monitored for suicidality daily and maintained on Suicide precaution  Level 3 (q15 min checks) .  The patient will have individual and group therapy with a master's level therapist. A master treatment plan will be developed and agreed upon by the patient and his/her treatment team.  The patient's estimated length of stay in the hospital is 5-7 days.     Stop Stelazine and increase Seroquel to 100mg at bedtime and titrate to effect.    Fatigue    - Check TSH, B12, vitamin D    This note was generated using a scribe, MAHNAZ Adams.  The work documented in this note was completed, reviewed, and approved by the attending psychiatrist as designated Dr. ISMAEL chirinos.

## 2018-06-10 PROCEDURE — 99232 SBSQ HOSP IP/OBS MODERATE 35: CPT | Performed by: PSYCHIATRY & NEUROLOGY

## 2018-06-10 RX ADMIN — HYDROXYZINE HYDROCHLORIDE 50 MG: 50 TABLET ORAL at 18:07

## 2018-06-10 RX ADMIN — NICOTINE 1 PATCH: 21 PATCH TRANSDERMAL at 10:19

## 2018-06-10 RX ADMIN — MIRTAZAPINE 15 MG: 15 TABLET, FILM COATED ORAL at 21:38

## 2018-06-10 RX ADMIN — ACETAMINOPHEN 650 MG: 325 TABLET, FILM COATED ORAL at 18:07

## 2018-06-10 NOTE — PLAN OF CARE
Problem: Patient Care Overview  Goal: Plan of Care Review  Outcome: Ongoing (interventions implemented as appropriate)   06/10/18 0304   Coping/Psychosocial   Plan of Care Reviewed With patient   Coping/Psychosocial   Patient Agreement with Plan of Care agrees   Plan of Care Review   Progress no change   OTHER   Outcome Summary Continues to be confused, hearing voices, thinks the devil is after her soul.

## 2018-06-10 NOTE — NURSING NOTE
Spoke with lab about nursing communication order and lab is unable to add those labs requested due to wrong tubes. Advised a new order would be needed.

## 2018-06-10 NOTE — PLAN OF CARE
Problem: Patient Care Overview  Goal: Plan of Care Review   06/10/18 1513   Coping/Psychosocial   Plan of Care Reviewed With patient   Coping/Psychosocial   Patient Agreement with Plan of Care agrees   Plan of Care Review   Progress no change   OTHER   Outcome Summary Patient isolates herself in room the first half of the shift.

## 2018-06-10 NOTE — PROGRESS NOTES
"      Inpatient Psy Progress Note   Clinician: Braden Avalos MD  Admission Date: 6/9/2018  1:59 PM 06/10/18    Behavioral Health Treatment Plan and Problem List: I have reviewed and approved the Behavioral Health Treatment Plan and Problem list.    Allergies  Allergies   Allergen Reactions   • Elavil [Amitriptyline] Nausea Only       Hospital Day: 1 day      Assessment completed within view of staff    History  CC: inpatient followup  Interval HPI: Patient seen and evaluated by me.  Chart reviewed. Staff reports that patient has appeared confused throughtout the recent shifts.  Responding to stimuli.  Thinks the devil is after her soul.   Patient tolerating meds okay.  Denies side effects.    Labs reviewed.  TSH, B12, vitamin D still pending.    Interval Review of Systems:   General ROS: negative for - fever or malaise  Endocrine ROS: negative for - palpitations  Respiratory ROS: no cough, shortness of breath, or wheezing  Cardiovascular ROS: no chest pain or dyspnea on exertion  Gastrointestinal ROS: no abdominal pain,no black or bloody stools    /85 (BP Location: Right arm, Patient Position: Sitting)   Pulse 75   Temp 97 °F (36.1 °C) (Temporal Artery )   Resp 18   Ht 167.6 cm (66\")   Wt 69.2 kg (152 lb 9.6 oz)   LMP 05/27/2018   SpO2 100%   BMI 24.63 kg/m²     Mental Status Exam  Mood: anxious  Affect: dysphoric   Thought Processes: disrupted  Thought Content: delusional  Hallucinations: no  Suicidal Thoughts: slight  Suicidal Plan/Intent:denies  Hopelesness:Moderate  Homicidal Thoughts:  absent      Medical Decision Making:   Labs:     Lab Results (last 24 hours)     ** No results found for the last 24 hours. **            Radiology:     Imaging Results (last 24 hours)     ** No results found for the last 24 hours. **            EKG:     ECG/EMG Results (most recent)     Procedure Component Value Units Date/Time    ECG 12 Lead [[id number]] Collected:  06/09/18 0823     Updated:  06/09/18 0830    " Narrative:       Test Reason : Potential adverse reaction to medications.  Blood Pressure : **/** mmHG  Vent. Rate : 085 BPM     Atrial Rate : 085 BPM     P-R Int : 128 ms          QRS Dur : 076 ms      QT Int : 392 ms       P-R-T Axes : 053 057 063 degrees     QTc Int : 466 ms    Normal sinus rhythm  T wave abnormality, consider anterior ischemia  Prolonged QT  Abnormal ECG  When compared with ECG of 26-DEC-2017 16:46,  No significant change was found    Referred By:  FAUSTINO           Confirmed By:            Medications:     mirtazapine 15 mg Oral Nightly   nicotine 1 patch Transdermal Q24H   QUEtiapine 100 mg Oral Nightly          All medications reviewed.      Assessment and Plan:    Active Problems:    MDD (major depressive disorder), recurrent, severe, with psychosis     -  Continue remeron and increase Seroquel to 200mg at bedtime.       - Check metabolic labs in AM      Continue hospitalization for safety and stabilization.  Continue current level of Special Precautions (q15 minute checks).

## 2018-06-11 LAB
ANION GAP SERPL CALCULATED.3IONS-SCNC: 0.6 MMOL/L (ref 3.6–11.2)
BUN BLD-MCNC: 14 MG/DL (ref 7–21)
BUN/CREAT SERPL: 18.4 (ref 7–25)
CALCIUM SPEC-SCNC: 9 MG/DL (ref 7.7–10)
CHLORIDE SERPL-SCNC: 114 MMOL/L (ref 99–112)
CHOLEST SERPL-MCNC: 135 MG/DL (ref 0–200)
CO2 SERPL-SCNC: 25.4 MMOL/L (ref 24.3–31.9)
CREAT BLD-MCNC: 0.76 MG/DL (ref 0.43–1.29)
GFR SERPL CREATININE-BSD FRML MDRD: 87 ML/MIN/1.73
GLUCOSE BLD-MCNC: 87 MG/DL (ref 70–110)
HDLC SERPL-MCNC: 50 MG/DL (ref 60–100)
LDLC SERPL CALC-MCNC: 73 MG/DL (ref 0–100)
LDLC/HDLC SERPL: 1.46 {RATIO}
OSMOLALITY SERPL CALC.SUM OF ELEC: 279.2 MOSM/KG (ref 273–305)
POTASSIUM BLD-SCNC: 3.8 MMOL/L (ref 3.5–5.3)
SODIUM BLD-SCNC: 140 MMOL/L (ref 135–153)
TRIGL SERPL-MCNC: 61 MG/DL (ref 0–150)
TSH SERPL DL<=0.05 MIU/L-ACNC: 0.35 MIU/ML (ref 0.55–4.78)
VIT B12 BLD-MCNC: 522 PG/ML (ref 211–911)
VLDLC SERPL-MCNC: 12.2 MG/DL

## 2018-06-11 PROCEDURE — 82607 VITAMIN B-12: CPT | Performed by: PSYCHIATRY & NEUROLOGY

## 2018-06-11 PROCEDURE — 82652 VIT D 1 25-DIHYDROXY: CPT | Performed by: PSYCHIATRY & NEUROLOGY

## 2018-06-11 PROCEDURE — 80061 LIPID PANEL: CPT | Performed by: PSYCHIATRY & NEUROLOGY

## 2018-06-11 PROCEDURE — 80048 BASIC METABOLIC PNL TOTAL CA: CPT | Performed by: PSYCHIATRY & NEUROLOGY

## 2018-06-11 PROCEDURE — 84443 ASSAY THYROID STIM HORMONE: CPT | Performed by: PSYCHIATRY & NEUROLOGY

## 2018-06-11 PROCEDURE — 99232 SBSQ HOSP IP/OBS MODERATE 35: CPT | Performed by: PSYCHIATRY & NEUROLOGY

## 2018-06-11 RX ORDER — TRIFLUOPERAZINE HYDROCHLORIDE 1 MG/1
1 TABLET, FILM COATED ORAL 3 TIMES DAILY
Status: DISCONTINUED | OUTPATIENT
Start: 2018-06-11 | End: 2018-06-13

## 2018-06-11 RX ADMIN — TRIFLUOPERAZINE HYDROCHLORIDE 1 MG: 5 TABLET, FILM COATED ORAL at 16:14

## 2018-06-11 RX ADMIN — ACETAMINOPHEN 650 MG: 325 TABLET, FILM COATED ORAL at 11:54

## 2018-06-11 RX ADMIN — TRIFLUOPERAZINE HYDROCHLORIDE 1 MG: 5 TABLET, FILM COATED ORAL at 21:03

## 2018-06-11 RX ADMIN — QUETIAPINE FUMARATE 100 MG: 100 TABLET, FILM COATED ORAL at 21:04

## 2018-06-11 RX ADMIN — TRIFLUOPERAZINE HYDROCHLORIDE 1 MG: 5 TABLET, FILM COATED ORAL at 11:53

## 2018-06-11 RX ADMIN — MIRTAZAPINE 15 MG: 15 TABLET, FILM COATED ORAL at 21:03

## 2018-06-11 NOTE — PLAN OF CARE
Problem: Patient Care Overview  Goal: Plan of Care Review  Outcome: Ongoing (interventions implemented as appropriate)   06/10/18 1513 06/11/18 0302   Coping/Psychosocial   Plan of Care Reviewed With --  patient   Coping/Psychosocial   Patient Agreement with Plan of Care --  agrees   Plan of Care Review   Progress --  no change   OTHER   Outcome Summary Patient isolates herself in room the first half of the shift. --

## 2018-06-11 NOTE — PLAN OF CARE
"Problem: Patient Care Overview  Goal: Interprofessional Rounds/Family Conf  Outcome: Ongoing (interventions implemented as appropriate)   06/11/18 1254   Interdisciplinary Rounds/Family Conf   Summary Spoke with patient who was reluctant but gave consent for contact with her mother   Interdisciplinary Rounds/Family Conf   Participants family;social work;patient     DATA: Met with patient in the day room and discussed with her that the psychiatrist would like contact with her mother for collateral and safety.  She reported that her mother usually makes statements that are not true to keep her here.  She stated that her mother told \"all kinds of things\" but could not give details about these things.  She reluctantly consented for this therapist to contact her mother.  Patients mother reported that patients behaviors have changed over the last month.  She reported that patient was usually visiting with family frequently and then started making up reasons for her mother to not visit her.  Patients mother reported that patient started becoming paranoid and was hallucinating.  She reported that patient has been seeing Dr. Chun outpatient.  She reported that she became very concerned when patient came out of the shower and stated that her birthmark had washed off in the shower, patients mother reported patient had a band aid on the tana and she could not get patient to allow her to check it.    ASSESSMENT:  Patient reports ongoing panic attacks and reports feeling no better than when she came in.  Patient has been isolating to her room.  Patient remains paranoid and appears to be responding to internal stimuli.    Plan:  Patient will continue stabilization.  Patient will follow up with Dr. Chun for outpatient behavioral health after care.        "

## 2018-06-11 NOTE — PROGRESS NOTES
"INPATIENT PSYCHIATRIC PROGRESS NOTE    Name:  Cintia Issa  :  1985  MRN:  [id number]  Visit Number:  [id number]  Length of stay:  2    Behavioral Health Treatment Plan and Problem List: I have reviewed and approved the Behavioral Health Treatment Plan and Problem list.    SUBJECTIVE    CC: \"Here to see what's going on with the panic attaches\"     INTERVAL HISTORY: chart reviewed. Today patient denying much of what is documented in the admission note and progress note. \"Something going on but don't know what is going on\".     Says is Rx'ed the Oxycodone from a pain clinic [place], Pharmacy in [place]. Seems to be evasive and perhaps not telling the truth?    Review prior [facility] admissions.    Depression rating 5/10  Anxiety rating 5/10  Sleep: thru the night.          Review of Systems   Respiratory: Negative.    Cardiovascular: Positive for chest pain.   Gastrointestinal: Negative.    Musculoskeletal: Positive for back pain.   Neurological: Negative.          OBJECTIVE    Temp:  [97.2 °F (36.2 °C)-98.8 °F (37.1 °C)] 97.2 °F (36.2 °C)  Heart Rate:  [74-77] 77  Resp:  [18] 18  BP: (116-119)/(76-78) 116/76    MENTAL STATUS EXAM:      Appearance:Casually dressed, good hygeine.   Cooperation:Cooperative  Psychomotor: No psychomotor agitation/retardation, No EPS, No motor tics  Speech-normal rate, amount.   Mood/Affect: Blunted  Thought Processes: associations intact  Thought Content: paranoid   Hallucination(s): Denied  Hopelessness: No  Optimistic:minimally  Suicidal Thoughts:  none  Suicidal Plan/Intent: none  Homicidal Thoughts:  absent  Orientation: oriented x 3  Memory: recent intact  But somewhat difficult to evaluate due to the patient's evasiveness.     Lab Results (last 24 hours)     Procedure Component Value Units Date/Time    Basic Metabolic Panel [[id number]]  (Abnormal) Collected:  18    Specimen:  Blood Updated:  18     Glucose 87 mg/dL      BUN 14 mg/dL      " Creatinine 0.76 mg/dL      Sodium 140 mmol/L      Potassium 3.8 mmol/L      Chloride 114 (H) mmol/L      CO2 25.4 mmol/L      Calcium 9.0 mg/dL      eGFR Non African Amer 87 mL/min/1.73      BUN/Creatinine Ratio 18.4     Anion Gap 0.6 (L) mmol/L     Narrative:       GFR Normal >60  Chronic Kidney Disease <60  Kidney Failure <15    Osmolality, Calculated [[id number]]  (Normal) Collected:  06/11/18 0527    Specimen:  Blood Updated:  06/11/18 0635     Osmolality Calc 279.2 mOsm/kg     TSH [[id number]]  (Abnormal) Collected:  06/11/18 0526    Specimen:  Blood Updated:  06/11/18 0631     TSH 0.346 (L) mIU/mL     Vitamin B12 [[id number]]  (Normal) Collected:  06/11/18 0526    Specimen:  Blood Updated:  06/11/18 0631     Vitamin B-12 522 pg/mL     Lipid Panel [[id number]]  (Abnormal) Collected:  06/11/18 0527    Specimen:  Blood Updated:  06/11/18 0626     Total Cholesterol 135 mg/dL      Triglycerides 61 mg/dL      HDL Cholesterol 50 (L) mg/dL      LDL Cholesterol  73 mg/dL      VLDL Cholesterol 12.2 mg/dL      LDL/HDL Ratio 1.46    Narrative:       Cholesterol Reference Ranges  (U.S. Department of Health and Human Services ATP III Classifications)    Desirable          <200 mg/dL  Borderline High    200-239 mg/dL  High Risk          >240 mg/dL      Triglyceride Reference Ranges  (U.S. Department of Health and Human Services ATP III Classifications)    Normal           <150 mg/dL  Borderline High  150-199 mg/dL  High             200-499 mg/dL  Very High        >500 mg/dL    HDL Reference Ranges  (U.S. Department of Health and Human Services ATP III Classifcations)    Low     <40 mg/dl (major risk factor for CHD)  High    >60 mg/dl ('negative' risk factor for CHD)        LDL Reference Ranges  (U.S. Department of Health and Human Services ATP III Classifcations)    Optimal          <100 mg/dL  Near Optimal     100-129 mg/dL  Borderline High  130-159 mg/dL  High             160-189 mg/dL  Very High        >189 mg/dL    Vitamin D  1,25 [facility] [[id number]] Collected:  06/11/18 0527    Specimen:  Blood Updated:  06/11/18 0551           Imaging Results (last 24 hours)     ** No results found for the last 24 hours. **           ECG/EMG Results (most recent)     Procedure Component Value Units Date/Time    ECG 12 Lead [[id number]] Collected:  06/09/18 0823     Updated:  06/11/18 0929    Narrative:       Test Reason : Potential adverse reaction to medications.  Blood Pressure : **/** mmHG  Vent. Rate : 085 BPM     Atrial Rate : 085 BPM     P-R Int : 128 ms          QRS Dur : 076 ms      QT Int : 392 ms       P-R-T Axes : 053 057 063 degrees     QTc Int : 466 ms    Normal sinus rhythm  T wave abnormality, consider anterior ischemia    Abnormal ECG  When compared with ECG of 26-DEC-2017 16:46,  No significant change was found  Confirmed by Luciano Cummings ([id number]) on 6/11/2018 9:28:46 AM    Referred By:  FAUSTINO           Confirmed By:Luciano Cummings           ALLERGIES: Elavil [amitriptyline]      Current Facility-Administered Medications:   •  acetaminophen (TYLENOL) tablet 650 mg, 650 mg, Oral, Q4H PRN, Braden Avalos MD, 650 mg at 06/10/18 1807  •  aluminum-magnesium hydroxide-simethicone (MAALOX MAX) 400-400-40 MG/5ML suspension 15 mL, 15 mL, Oral, Q6H PRN, Braden Avalos MD  •  benzonatate (TESSALON) capsule 100 mg, 100 mg, Oral, TID PRN, Braden Avalos MD  •  benztropine (COGENTIN) tablet 1 mg, 1 mg, Oral, Daily PRN **OR** benztropine (COGENTIN) injection 0.5 mg, 0.5 mg, Intramuscular, Daily PRN, Braden Avalos MD  •  famotidine (PEPCID) tablet 20 mg, 20 mg, Oral, BID PRN, rBaden Avalos MD  •  hydrOXYzine (ATARAX) tablet 50 mg, 50 mg, Oral, Q6H PRN, Brdaen Avalos MD, 50 mg at 06/10/18 1807  •  magnesium hydroxide (MILK OF MAGNESIA) suspension 2400 mg/10mL 10 mL, 10 mL, Oral, Daily PRN, Braden Avalos MD  •  mirtazapine (REMERON) tablet 15 mg, 15 mg, Oral, Nightly, Braden Avalos MD, 15 mg at 06/10/18 0171  •  nicotine  (NICODERM CQ) 21 MG/24HR patch 1 patch, 1 patch, Transdermal, Q24H, Braden Avalos MD, 1 patch at 06/10/18 1019  •  ondansetron (ZOFRAN) tablet 4 mg, 4 mg, Oral, Q6H PRN, Braden Avalos MD  •  QUEtiapine (SEROquel) tablet 100 mg, 100 mg, Oral, Nightly, Braden Avalos MD  •  sodium chloride (OCEAN) nasal spray 2 spray, 2 spray, Each Nare, PRN, Braden Avalos MD  •  traZODone (DESYREL) tablet 50 mg, 50 mg, Oral, Nightly PRN, Braden Avalos MD    ASSESSMENT & PLAN    Patient Active Problem List   Diagnosis   • Fibromyalgia Plan: Treat symptomatically avoiding opiates.     • Essential hypertension Plan: At this point patient normotensive without medications, will monitor pros respectively    • Schizophrenia, paranoid  Plan: We will restart the Stelazine while continuing the Seroquel at bedtime discontinuing Remeron, will need collateral information from family if possible regarding patient's recent behavior and status    • MDD (major depressive disorder), recurrent, severe, with psychosis Plan: At this point patient only moderately depressed denying any suicidal thoughts or intent.           Suicide precautions: Suicide precaution Level 3 (q15 min checks)     Behavioral Health Treatment Plan and Problem List: I have reviewed and approved the Behavioral Health Treatment Plan and Problem list.    Clinician:  Gumaro Avalos MD  06/11/18  10:52 AM    Dictated utilizing Dragon dictation

## 2018-06-11 NOTE — PLAN OF CARE
Problem: Patient Care Overview  Goal: Plan of Care Review  Outcome: Ongoing (interventions implemented as appropriate)   06/11/18 4431   Coping/Psychosocial   Plan of Care Reviewed With patient   Coping/Psychosocial   Patient Agreement with Plan of Care agrees   Plan of Care Review   Progress no change   OTHER   Outcome Summary Pt isolates in her room most of day, appears bewildered.

## 2018-06-12 PROCEDURE — 99232 SBSQ HOSP IP/OBS MODERATE 35: CPT | Performed by: PSYCHIATRY & NEUROLOGY

## 2018-06-12 RX ADMIN — NICOTINE 1 PATCH: 21 PATCH TRANSDERMAL at 09:53

## 2018-06-12 RX ADMIN — ACETAMINOPHEN 650 MG: 325 TABLET, FILM COATED ORAL at 11:09

## 2018-06-12 RX ADMIN — TRIFLUOPERAZINE HYDROCHLORIDE 1 MG: 5 TABLET, FILM COATED ORAL at 09:53

## 2018-06-12 RX ADMIN — ACETAMINOPHEN 650 MG: 325 TABLET, FILM COATED ORAL at 15:21

## 2018-06-12 RX ADMIN — TRIFLUOPERAZINE HYDROCHLORIDE 1 MG: 5 TABLET, FILM COATED ORAL at 15:18

## 2018-06-12 RX ADMIN — ACETAMINOPHEN 650 MG: 325 TABLET, FILM COATED ORAL at 20:58

## 2018-06-12 RX ADMIN — MIRTAZAPINE 15 MG: 15 TABLET, FILM COATED ORAL at 20:54

## 2018-06-12 RX ADMIN — TRIFLUOPERAZINE HYDROCHLORIDE 1 MG: 5 TABLET, FILM COATED ORAL at 20:54

## 2018-06-12 NOTE — PLAN OF CARE
Problem: Patient Care Overview  Goal: Plan of Care Review  Outcome: Ongoing (interventions implemented as appropriate)   06/12/18 1811   Coping/Psychosocial   Plan of Care Reviewed With patient   Coping/Psychosocial   Patient Agreement with Plan of Care agrees   Plan of Care Review   Progress no change   OTHER   Outcome Summary Continues to isolate, reports 'I have mixed up thoughts that won't go away. Not voices, just constant thoughts.'

## 2018-06-12 NOTE — PROGRESS NOTES
"INPATIENT PSYCHIATRIC PROGRESS NOTE    Name:  Cintia Issa  :  1985  MRN:  [id number]  Visit Number:  [id number]  Length of stay:  3    Behavioral Health Treatment Plan and Problem List: I have reviewed and approved the Behavioral Health Treatment Plan and Problem list.    SUBJECTIVE    CC: \"Don't understand why having these panic attaches\".     INTERVAL HISTORY: remains guarded.\"Some kind of thoughts, like mind control, things I would never say, been going on for a year\". Reports nightmares. Returns to concerns with what she calls panic attaches.  Sits and states \"it's coming on\" but unable to further describe and wants to leave the interview.    Requesting cardiac consult - perhaps an Echocardiogram indicated to r/o MVP.     Once again encouraged patient to utilize the depo medication format.     Depression rating 2/10  Anxiety rating 5/10  Sleep: 6-7 hours or more         Review of Systems   Respiratory: Negative.    Cardiovascular: Positive for chest pain.   Gastrointestinal: Negative.    Musculoskeletal: Positive for back pain.   Neurological: Positive for headaches.         OBJECTIVE    Temp:  [97.2 °F (36.2 °C)-97.9 °F (36.6 °C)] 97.9 °F (36.6 °C)  Heart Rate:  [77-84] 84  Resp:  [18] 18  BP: (116-138)/(76-90) 138/90    MENTAL STATUS EXAM:      Appearance:Casually dressed, good hygeine.   Cooperation:Cooperative  Psychomotor: No psychomotor agitation/retardation, No EPS, No motor tics  Speech-normal rate, amount.   Mood/Affect: Blunted  Thought Processes: associations intact  Thought Content: distorted   Hallucination(s): Has been observed responding to external stimuli not apparent to others.,  Denies auditory hallucinations at interview  Hopelessness: No  Optimistic:No  Suicidal Thoughts:  none  Suicidal Plan/Intent: none  Homicidal Thoughts:  absent  Orientation: oriented x 3  Memory: recent intact    Lab Results (last 24 hours)     ** No results found for the last 24 hours. **    "        Imaging Results (last 24 hours)     ** No results found for the last 24 hours. **           ECG/EMG Results (most recent)     Procedure Component Value Units Date/Time    ECG 12 Lead [[id number]] Collected:  06/09/18 0823     Updated:  06/11/18 0929    Narrative:       Test Reason : Potential adverse reaction to medications.  Blood Pressure : **/** mmHG  Vent. Rate : 085 BPM     Atrial Rate : 085 BPM     P-R Int : 128 ms          QRS Dur : 076 ms      QT Int : 392 ms       P-R-T Axes : 053 057 063 degrees     QTc Int : 466 ms    Normal sinus rhythm  T wave abnormality, consider anterior ischemia    Abnormal ECG  When compared with ECG of 26-DEC-2017 16:46,  No significant change was found  Confirmed by Luciano Cummings ([id number]) on 6/11/2018 9:28:46 AM    Referred By:  FAUSTINO           Confirmed By:Luciano Cummings           ALLERGIES: Elavil [amitriptyline]      Current Facility-Administered Medications:   •  acetaminophen (TYLENOL) tablet 650 mg, 650 mg, Oral, Q4H PRN, Braden Avalos MD, 650 mg at 06/11/18 1154  •  aluminum-magnesium hydroxide-simethicone (MAALOX MAX) 400-400-40 MG/5ML suspension 15 mL, 15 mL, Oral, Q6H PRN, Braden Avalos MD  •  benzonatate (TESSALON) capsule 100 mg, 100 mg, Oral, TID PRN, Braden Avalos MD  •  benztropine (COGENTIN) tablet 1 mg, 1 mg, Oral, Daily PRN **OR** benztropine (COGENTIN) injection 0.5 mg, 0.5 mg, Intramuscular, Daily PRN, Braden Avalos MD  •  famotidine (PEPCID) tablet 20 mg, 20 mg, Oral, BID PRN, Braden Avalos MD  •  hydrOXYzine (ATARAX) tablet 50 mg, 50 mg, Oral, Q6H PRN, Braden Avalos MD, 50 mg at 06/10/18 1807  •  magnesium hydroxide (MILK OF MAGNESIA) suspension 2400 mg/10mL 10 mL, 10 mL, Oral, Daily PRN, Braden Avalos MD  •  mirtazapine (REMERON) tablet 15 mg, 15 mg, Oral, Nightly, Braden Avalos MD, 15 mg at 06/11/18 2103  •  nicotine (NICODERM CQ) 21 MG/24HR patch 1 patch, 1 patch, Transdermal, Q24H, Braden Avalos MD, 1 patch at  "06/10/18 1019  •  ondansetron (ZOFRAN) tablet 4 mg, 4 mg, Oral, Q6H PRN, Braden Avalos MD  •  QUEtiapine (SEROquel) tablet 100 mg, 100 mg, Oral, Nightly, Braden Avalos MD, 100 mg at 06/11/18 2104  •  sodium chloride (OCEAN) nasal spray 2 spray, 2 spray, Each Nare, PRN, Braden Avalos MD  •  traZODone (DESYREL) tablet 50 mg, 50 mg, Oral, Nightly PRN, Braden Avalos MD  •  trifluoperazine (STELAZINE) tablet 1 mg, 1 mg, Oral, TID, Gumaro Avalos MD, 1 mg at 06/11/18 2103    ASSESSMENT & PLAN    Patient Active Problem List   Diagnosis   • Fibromyalgia Plan: Treat symptomatically avoiding opiates.     • Essential hypertension Plan: At this point patient normotensive without medications, will monitor pros respectively    • Schizophrenia, paranoid  Plan: Stelazine while continuing the Seroquel at bedtime discontinuing Remeron, will need collateral information from family if possible regarding patient's recent behavior and status    • MDD (major depressive disorder), recurrent, severe, with psychosis Plan: At this point patient only moderately depressed denying any suicidal thoughts or intent. Will stop the Remeron due to her complaint of \"strange dreams\". Will stay with the Seroquel.            Suicide precautions: Suicide precaution Level 3 (q15 min checks)     Behavioral Health Treatment Plan and Problem List: I have reviewed and approved the Behavioral Health Treatment Plan and Problem list.    Clinician:  Gumaro Avalos MD  06/12/18  7:41 AM    Dictated utilizing Dragon dictation     "

## 2018-06-12 NOTE — DISCHARGE INSTR - APPOINTMENTS
Follow up with: Dr. Perea   on 06- at 2 PM                           [address] [place] [address]                           [phone]

## 2018-06-12 NOTE — PROGRESS NOTES
Navigator is assisting primary therapist with discharge planning. Contacted Dr Chun's office to schedule an appt. They report that the patient has [facility] insurance and currently has a bill of over 500.00. They were agreeable to coordinate an aftercare appt for the patient but she will have a 75.00 co-pay.     Appt is scheduled with Dr Chun for June 19th @ 2:00pm.

## 2018-06-12 NOTE — PROGRESS NOTES
"Therapist met with the Patient as covering Therapist for Tana Zelaya, she was agreeable. The Patient reports that she is unsure why she was admitted to the hospital, however, is certain that she is having some type of medical issue as she has been experiencing chest pain, and reports that although she has had panic attacks, that she has never had them with chest pains. The Patient is guarded and as times evasive. She reports that she doesn't want this therapist to call her mother, she will \"tell things that will keep me here\". The patient seems to be focused on these \"attacks\" and is difficult to assess otherwise.     Therapist talked with her about aftercare and she is willing to see Chuck Chanel in [facility] office, however is uncertain if her chart is still open. She reports that her family insists that she continue to see Dr Chun, however, it doesn't seem that she is very compliant with any outpatient treatment. Case Management would be appropriate if she would agree, possibly when she is less paranoid.   "

## 2018-06-13 LAB — 1,25(OH)2D3 SERPL-MCNC: 37.5 PG/ML (ref 19.9–79.3)

## 2018-06-13 PROCEDURE — 99232 SBSQ HOSP IP/OBS MODERATE 35: CPT | Performed by: PSYCHIATRY & NEUROLOGY

## 2018-06-13 RX ORDER — FLUPHENAZINE HYDROCHLORIDE 5 MG/1
5 TABLET ORAL 2 TIMES DAILY
Status: DISCONTINUED | OUTPATIENT
Start: 2018-06-13 | End: 2018-06-15 | Stop reason: HOSPADM

## 2018-06-13 RX ADMIN — ACETAMINOPHEN 650 MG: 325 TABLET, FILM COATED ORAL at 17:08

## 2018-06-13 RX ADMIN — ACETAMINOPHEN 650 MG: 325 TABLET, FILM COATED ORAL at 08:35

## 2018-06-13 RX ADMIN — ACETAMINOPHEN 650 MG: 325 TABLET, FILM COATED ORAL at 20:22

## 2018-06-13 RX ADMIN — QUETIAPINE FUMARATE 100 MG: 100 TABLET, FILM COATED ORAL at 20:22

## 2018-06-13 RX ADMIN — HYDROXYZINE HYDROCHLORIDE 50 MG: 50 TABLET ORAL at 20:22

## 2018-06-13 RX ADMIN — NICOTINE 1 PATCH: 21 PATCH TRANSDERMAL at 08:33

## 2018-06-13 RX ADMIN — FLUPHENAZINE HYDROCHLORIDE 5 MG: 5 TABLET, FILM COATED ORAL at 20:22

## 2018-06-13 RX ADMIN — FLUPHENAZINE HYDROCHLORIDE 5 MG: 5 TABLET, FILM COATED ORAL at 10:24

## 2018-06-13 NOTE — PLAN OF CARE
Problem: Patient Care Overview  Goal: Plan of Care Review  Outcome: Ongoing (interventions implemented as appropriate)   06/13/18 0105   Coping/Psychosocial   Plan of Care Reviewed With patient   Coping/Psychosocial   Patient Agreement with Plan of Care agrees   Plan of Care Review   Progress no change       Problem: Overarching Goals (Adult)  Goal: Adheres to Safety Considerations for Self and Others  Outcome: Ongoing (interventions implemented as appropriate)    Goal: Optimized Coping Skills in Response to Life Stressors  Outcome: Ongoing (interventions implemented as appropriate)    Goal: Develops/Participates in Therapeutic [place] to Support Successful Transition  Outcome: Ongoing (interventions implemented as appropriate)

## 2018-06-13 NOTE — PROGRESS NOTES
"INPATIENT PSYCHIATRIC PROGRESS NOTE    Name:  Cintia Issa  :  1985  MRN:  [id number]  Visit Number:  [id number]  Length of stay:  4    Behavioral Health Treatment Plan and Problem List: I have reviewed and approved the Behavioral Health Treatment Plan and Problem list.    SUBJECTIVE  CC: \"About the same\"     INTERVAL HISTORY: \"Just don't know what this is, it is weird, tis feeling, it is like pain and sensations never had before, worse at time but never went away\".  Unable to demonstrate paranoia delusions and no hallucination. Bizarre and hard to define. Perhaps ideas of reference and possible ideas of passivity (inappropirate laughter).    OK with us talking to her mother.     Awaiting cardiology consult.     Seen with therapist trying to figure out the patient's status.          Depression rating \"sort of depressed\"/10  Anxiety rating 8/10  Sleep: slept better         Review of Systems   Respiratory: Negative.    Cardiovascular: Positive for chest pain.   Gastrointestinal: Negative.    Musculoskeletal: Positive for back pain.   Neurological: Negative.          OBJECTIVE    Temp:  [97.2 °F (36.2 °C)] 97.2 °F (36.2 °C)  Heart Rate:  [85] 85  Resp:  [18] 18  BP: (124)/(84) 124/84    MENTAL STATUS EXAM:      Appearance:Casually dressed, good hygeine.   Cooperation:Cooperative  Psychomotor: No psychomotor agitation/retardation, No EPS, No motor tics  Speech-normal rate, amount.   Mood/Affect: Blunted  Thought Processes: associations intact  Thought Content: dilussional and distorted   Hallucination(s): none  Hopelessness: No  Optimistic:minimally  Suicidal Thoughts:  none  Suicidal Plan/Intent: none  Homicidal Thoughts:  absent  Orientation: oriented x 3  Memory: recent intact    Lab Results (last 24 hours)     ** No results found for the last 24 hours. **           Imaging Results (last 24 hours)     ** No results found for the last 24 hours. **           ECG/EMG Results (most recent)     Procedure " Component Value Units Date/Time    ECG 12 Lead [[id number]] Collected:  06/09/18 0823     Updated:  06/11/18 0929    Narrative:       Test Reason : Potential adverse reaction to medications.  Blood Pressure : **/** mmHG  Vent. Rate : 085 BPM     Atrial Rate : 085 BPM     P-R Int : 128 ms          QRS Dur : 076 ms      QT Int : 392 ms       P-R-T Axes : 053 057 063 degrees     QTc Int : 466 ms    Normal sinus rhythm  T wave abnormality, consider anterior ischemia    Abnormal ECG  When compared with ECG of 26-DEC-2017 16:46,  No significant change was found  Confirmed by Luciano Cummings ([id number]) on 6/11/2018 9:28:46 AM    Referred By:  FAUSTINO           Confirmed By:Luciano Cummings           ALLERGIES: Elavil [amitriptyline]      Current Facility-Administered Medications:   •  acetaminophen (TYLENOL) tablet 650 mg, 650 mg, Oral, Q4H PRN, Braden Avalos MD, 650 mg at 06/12/18 2058  •  aluminum-magnesium hydroxide-simethicone (MAALOX MAX) 400-400-40 MG/5ML suspension 15 mL, 15 mL, Oral, Q6H PRN, Braden Avalos MD  •  benzonatate (TESSALON) capsule 100 mg, 100 mg, Oral, TID PRN, Braden Avalos MD  •  benztropine (COGENTIN) tablet 1 mg, 1 mg, Oral, Daily PRN **OR** benztropine (COGENTIN) injection 0.5 mg, 0.5 mg, Intramuscular, Daily PRN, Braden Avalos MD  •  famotidine (PEPCID) tablet 20 mg, 20 mg, Oral, BID PRN, Braden Avalos MD  •  hydrOXYzine (ATARAX) tablet 50 mg, 50 mg, Oral, Q6H PRN, Braden Avalos MD, 50 mg at 06/10/18 1807  •  magnesium hydroxide (MILK OF MAGNESIA) suspension 2400 mg/10mL 10 mL, 10 mL, Oral, Daily PRN, Braden Avalos MD  •  mirtazapine (REMERON) tablet 15 mg, 15 mg, Oral, Nightly, Braden Avalos MD, 15 mg at 06/12/18 2054  •  nicotine (NICODERM CQ) 21 MG/24HR patch 1 patch, 1 patch, Transdermal, Q24H, Braden Avalos MD, 1 patch at 06/12/18 0953  •  ondansetron (ZOFRAN) tablet 4 mg, 4 mg, Oral, Q6H PRN, Braden Avalos MD  •  QUEtiapine (SEROquel) tablet 100 mg, 100 mg, Oral,  "Nightly, Braden Avalos MD, 100 mg at 06/11/18 2104  •  sodium chloride (OCEAN) nasal spray 2 spray, 2 spray, Each Nare, PRN, Braden Avalos MD  •  traZODone (DESYREL) tablet 50 mg, 50 mg, Oral, Nightly PRN, Braden Avalos MD  •  trifluoperazine (STELAZINE) tablet 1 mg, 1 mg, Oral, TID, Gumaro Avalos MD, 1 mg at 06/12/18 2054    ASSESSMENT & PLAN    Diagnosis   • Fibromyalgia Plan: Treat symptomatically avoiding opiates.     • Essential hypertension Plan: At this point patient normotensive without medications, will monitor pros respectively    • Schizophrenia, paranoid  Plan: Changing to Prolixin hoping that the patient might accept depo format in the future. Continuing the Seroquel at bedtime discontinuing Remeron, will need collateral information from family if possible regarding patient's recent behavior and status    • MDD (major depressive disorder), recurrent, severe, with psychosis Plan: At this point patient only moderately depressed denying any suicidal thoughts or intent. Will stop the Remeron due to her complaint of \"strange dreams\". Will stay with the Seroquel.            Suicide precautions: Suicide precaution Level 3 (q15 min checks)     Behavioral Health Treatment Plan and Problem List: I have reviewed and approved the Behavioral Health Treatment Plan and Problem list.    Clinician:  Gumaro Avalos MD  06/13/18  8:10 AM    Dictated utilizing Dragon dictation     "

## 2018-06-13 NOTE — PLAN OF CARE
Problem: Patient Care Overview  Goal: Plan of Care Review  Outcome: Ongoing (interventions implemented as appropriate)   06/13/18 8731   Coping/Psychosocial   Plan of Care Reviewed With patient   Coping/Psychosocial   Patient Agreement with Plan of Care agrees   Plan of Care Review   Progress no change

## 2018-06-13 NOTE — PLAN OF CARE
Problem: Patient Care Overview  Goal: Interprofessional Rounds/Family Conf   06/13/18 0910   Interdisciplinary Rounds/Family Conf   Summary Treatment Team Staffing   Interdisciplinary Rounds/Family Conf   Participants psychiatrist;other (see comments)  (Therapist)     Therapist completed staffing on this date with Dr Avalos. Therapist sat in on his session with her, then discussed her plan for treatment with him following. The treatment team feels as though more information would be beneficial and the Therapist plans to phone her mother Arabella Seo on this date [phone]. See Therapist note

## 2018-06-13 NOTE — PROGRESS NOTES
"Therapist met with the Patient during treatment team with Dr Avalos, she was agreeable. The Patient spent much of the session talking with her eyes closed. She continues to be difficult to follow. She reluctantly agreed to allow the Therapist to speak with her Mother, although she continues to express concern that her mother will tell things that will prolong hospitalization. The Patient was paranoid about signing the consent, she read it in its entirety and questioned several aspects. Therapist explained to her that the consent was to talk with her mother only and asked if there was anything that she did not want disclosed, she denied anything. Therapist phoned her Mother, Arabella Seo at the number she provided [phone]. Ms. Seo reports that she had been concerned about her daughter for the last month. She reports that she had began talking about people being in her home. She purchased a new door for her daughter and surveillance equipment for her home to try to make her feel safe. She reports that she would swear that people were in her home, that they moved her things around. She thought that the birds singing were a sign that there were people around, watching her. She would pile up sticks in the yard in random piles for no reason.  She reports that she began biting her nails again, that she saw her one day tearing pieces of her nails off with a pair of tweezers. She had also started to isolate, she wouldn't talk with her on the phone and wouldn't leave her home often. When she would come to her Mother's home she would cry without reason. She would wash her hands excessively and become upset when no one else could see what she could see on her hands. She reports that she got out of the shower and reported that her birthmark had \"washed off\", she knew then that the Patient needed help. She reports that she had taken her to see Dr Chun soon after and talked with her about these symptoms and was told " that she should bring her to the ED if they worsen. The Patient admitted to her in the ED that she hadn't taken her medications in over one month. Therapist talked with her mother about the charges from Dr Chun's office, and asked about her thoughts on follow up. She reports that they never mentioned these charges when she went for the last appointment, however suspects that this may have been a way for them to discharge the patient, as she is not always compliant. Therapist discussed some alternative options that would likely take her insurance and explained that she would discuss these with the Patient when she becomes more stable.

## 2018-06-14 VITALS
HEART RATE: 98 BPM | SYSTOLIC BLOOD PRESSURE: 112 MMHG | TEMPERATURE: 97.9 F | OXYGEN SATURATION: 98 % | DIASTOLIC BLOOD PRESSURE: 76 MMHG | BODY MASS INDEX: 24.53 KG/M2 | RESPIRATION RATE: 18 BRPM | HEIGHT: 66 IN | WEIGHT: 152.6 LBS

## 2018-06-14 LAB
BH CV ECHO MEAS - % IVS THICK: 25 %
BH CV ECHO MEAS - % LVPW THICK: 109.1 %
BH CV ECHO MEAS - ACS: 2.1 CM
BH CV ECHO MEAS - AO MAX PG: 7.8 MMHG
BH CV ECHO MEAS - AO MEAN PG: 4.3 MMHG
BH CV ECHO MEAS - AO ROOT AREA (BSA CORRECTED): 1.6
BH CV ECHO MEAS - AO ROOT AREA: 6.2 CM^2
BH CV ECHO MEAS - AO ROOT DIAM: 2.8 CM
BH CV ECHO MEAS - AO V2 MAX: 140 CM/SEC
BH CV ECHO MEAS - AO V2 MEAN: 96.6 CM/SEC
BH CV ECHO MEAS - AO V2 VTI: 22.1 CM
BH CV ECHO MEAS - BSA(HAYCOCK): 1.8 M^2
BH CV ECHO MEAS - BSA: 1.8 M^2
BH CV ECHO MEAS - BZI_BMI: 24.5 KILOGRAMS/M^2
BH CV ECHO MEAS - BZI_METRIC_HEIGHT: 167.6 CM
BH CV ECHO MEAS - BZI_METRIC_WEIGHT: 68.9 KG
BH CV ECHO MEAS - CONTRAST EF 4CH: 58.1 ML/M^2
BH CV ECHO MEAS - EDV(CUBED): 76.9 ML
BH CV ECHO MEAS - EDV(MOD-SP4): 43 ML
BH CV ECHO MEAS - EDV(TEICH): 80.9 ML
BH CV ECHO MEAS - EF(CUBED): 80.7 %
BH CV ECHO MEAS - EF(MOD-SP4): 58.1 %
BH CV ECHO MEAS - EF(TEICH): 73.6 %
BH CV ECHO MEAS - ESV(CUBED): 14.8 ML
BH CV ECHO MEAS - ESV(MOD-SP4): 18 ML
BH CV ECHO MEAS - ESV(TEICH): 21.4 ML
BH CV ECHO MEAS - FS: 42.2 %
BH CV ECHO MEAS - IVS/LVPW: 1.1
BH CV ECHO MEAS - IVSD: 0.88 CM
BH CV ECHO MEAS - IVSS: 1.1 CM
BH CV ECHO MEAS - LA DIMENSION: 2.4 CM
BH CV ECHO MEAS - LA/AO: 0.86
BH CV ECHO MEAS - LV DIASTOLIC VOL/BSA (35-75): 24.2 ML/M^2
BH CV ECHO MEAS - LV MASS(C)D: 110.9 GRAMS
BH CV ECHO MEAS - LV MASS(C)DI: 62.3 GRAMS/M^2
BH CV ECHO MEAS - LV MASS(C)S: 108.2 GRAMS
BH CV ECHO MEAS - LV MASS(C)SI: 60.8 GRAMS/M^2
BH CV ECHO MEAS - LV SYSTOLIC VOL/BSA (12-30): 10.1 ML/M^2
BH CV ECHO MEAS - LVIDD: 4.3 CM
BH CV ECHO MEAS - LVIDS: 2.5 CM
BH CV ECHO MEAS - LVLD AP4: 6.7 CM
BH CV ECHO MEAS - LVLS AP4: 5.6 CM
BH CV ECHO MEAS - LVOT AREA (M): 3.1 CM^2
BH CV ECHO MEAS - LVOT AREA: 3.1 CM^2
BH CV ECHO MEAS - LVOT DIAM: 2 CM
BH CV ECHO MEAS - LVPWD: 0.81 CM
BH CV ECHO MEAS - LVPWS: 1.7 CM
BH CV ECHO MEAS - MV A MAX VEL: 43.9 CM/SEC
BH CV ECHO MEAS - MV E MAX VEL: 64.2 CM/SEC
BH CV ECHO MEAS - MV E/A: 1.5
BH CV ECHO MEAS - PA ACC SLOPE: 836.5 CM/SEC^2
BH CV ECHO MEAS - PA ACC TIME: 0.12 SEC
BH CV ECHO MEAS - PA PR(ACCEL): 25.1 MMHG
BH CV ECHO MEAS - RAP SYSTOLE: 10 MMHG
BH CV ECHO MEAS - RVSP: 22.4 MMHG
BH CV ECHO MEAS - SI(AO): 76.8 ML/M^2
BH CV ECHO MEAS - SI(CUBED): 34.9 ML/M^2
BH CV ECHO MEAS - SI(MOD-SP4): 14 ML/M^2
BH CV ECHO MEAS - SI(TEICH): 33.5 ML/M^2
BH CV ECHO MEAS - SV(AO): 136.8 ML
BH CV ECHO MEAS - SV(CUBED): 62.1 ML
BH CV ECHO MEAS - SV(MOD-SP4): 25 ML
BH CV ECHO MEAS - SV(TEICH): 59.6 ML
BH CV ECHO MEAS - TR MAX VEL: 176.2 CM/SEC
MAXIMAL PREDICTED HEART RATE: 186 BPM
STRESS TARGET HR: 158 BPM

## 2018-06-14 PROCEDURE — 99232 SBSQ HOSP IP/OBS MODERATE 35: CPT | Performed by: PSYCHIATRY & NEUROLOGY

## 2018-06-14 RX ADMIN — HYDROXYZINE HYDROCHLORIDE 50 MG: 50 TABLET ORAL at 17:21

## 2018-06-14 RX ADMIN — NICOTINE 1 PATCH: 21 PATCH TRANSDERMAL at 08:34

## 2018-06-14 RX ADMIN — ACETAMINOPHEN 650 MG: 325 TABLET, FILM COATED ORAL at 20:56

## 2018-06-14 RX ADMIN — ACETAMINOPHEN 650 MG: 325 TABLET, FILM COATED ORAL at 08:35

## 2018-06-14 RX ADMIN — FLUPHENAZINE HYDROCHLORIDE 5 MG: 5 TABLET, FILM COATED ORAL at 21:34

## 2018-06-14 RX ADMIN — FLUPHENAZINE HYDROCHLORIDE 5 MG: 5 TABLET, FILM COATED ORAL at 08:33

## 2018-06-14 RX ADMIN — ACETAMINOPHEN 650 MG: 325 TABLET, FILM COATED ORAL at 13:49

## 2018-06-14 NOTE — PLAN OF CARE
Problem: Patient Care Overview  Goal: Individualization and Mutuality  Outcome: Ongoing (interventions implemented as appropriate)   06/09/18 8207   Personal Strengths/Vulnerabilities   Patient Personal Strengths motivated for treatment   Patient Vulnerabilities psychosis     DATA: Met with patient today and she reported that she continues to have these strange episodes.  She reports that she feels she is having chest pain when she has them and she will be having some tests run for heart issues.  She reports that the doctor has discussed giving her an injection of her medications to assist with compliance issues.  She reports that after she has the tests she might be able to start the injections and she reports she is agreeable.  She reports she is feeling much better and she is hopeful to get to go home soon.    ASSESSMENT:  Patient appears calm and cooperative.  Patient denies homicidal ideation and denies suicidal ideation.  Patient continues to have concerns with these episodes of strange sensations and chest pain.    PLAN:  Patient will continue stabilization.  Patient is planned to return home upon stabilization.  Patient is scheduled with Dr. Chun for aftercare.

## 2018-06-14 NOTE — PLAN OF CARE
"Problem: Patient Care Overview  Goal: Plan of Care Review  Outcome: Ongoing (interventions implemented as appropriate)   06/14/18 5379   Coping/Psychosocial   Plan of Care Reviewed With patient   Coping/Psychosocial   Patient Agreement with Plan of Care agrees with comment (describe)  (Paranoid, apprehensive, and preocc with going home. )   Plan of Care Review   Progress no change   OTHER   Outcome Summary Pt. is stable and slept all night. Reports anx/depr 3/3 Denies hallucinations but states \"I am getting control of my thoughts, I think.\" Denies SI and HI. Is withdrawn and suspicious but cooperative.          "

## 2018-06-14 NOTE — NURSING NOTE
Cardiology consult added for Dr. Fernandez and NP Shauna Gunn. Left message to return call and awaiting response. Will call Dr. Fernandez at noon.

## 2018-06-14 NOTE — PLAN OF CARE
Problem: Patient Care Overview  Goal: Plan of Care Review  Outcome: Ongoing (interventions implemented as appropriate)   06/14/18 3412   Coping/Psychosocial   Plan of Care Reviewed With patient   Coping/Psychosocial   Patient Agreement with Plan of Care agrees   Plan of Care Review   Progress improving   OTHER   Outcome Summary Patient is paranoid this shift and isolates in room most of the day. Patient appetite and sleep are good with no issues to report. Patient denies anxiety, depression, SI, HI, or AVH. Patient is very suspiscious of staff at times but has been cooperative. Will continue to monitor.     Goal: Individualization and Mutuality  Outcome: Ongoing (interventions implemented as appropriate)    Goal: Discharge Needs Assessment  Outcome: Ongoing (interventions implemented as appropriate)    Goal: Interprofessional Rounds/Family Conf  Outcome: Ongoing (interventions implemented as appropriate)      Problem: Overarching Goals (Adult)  Goal: Adheres to Safety Considerations for Self and Others  Outcome: Ongoing (interventions implemented as appropriate)    Goal: Optimized Coping Skills in Response to Life Stressors  Outcome: Ongoing (interventions implemented as appropriate)    Goal: Develops/Participates in Therapeutic [place] to Support Successful Transition  Outcome: Ongoing (interventions implemented as appropriate)

## 2018-06-14 NOTE — PROGRESS NOTES
"INPATIENT PSYCHIATRIC PROGRESS NOTE    Name:  Cintia Issa  :  1985  MRN:  [id number]  Visit Number:  [id number]  Length of stay:  5    Behavioral Health Treatment Plan and Problem List: I have reviewed and approved the Behavioral Health Treatment Plan and Problem list.    SUBJECTIVE  CC: \"feel alright\".     INTERVAL HISTORY: reviewed therapist note, very helpful. Patient in denial of much of the mother's concerns and what she reported. Not at all sure we can gain the patient's cooperation/ compliance. At this point not able to convert to depo meds - will keep trying.     Depression rating 210  Anxiety rating 10  Sleep:7-8 hours       Review of Systems   Respiratory: Negative.    Cardiovascular: Negative.    Gastrointestinal: Negative.    Musculoskeletal: Negative.    Neurological: Negative.          OBJECTIVE    Temp:  [97.7 °F (36.5 °C)-97.8 °F (36.6 °C)] 97.8 °F (36.6 °C)  Heart Rate:  [85-88] 85  Resp:  [18] 18  BP: (109-116)/(68-72) 109/68    MENTAL STATUS EXAM:      Appearance:Casually dressed, good hygeine.   Cooperation:Cooperative  Psychomotor: No psychomotor agitation/retardation, No EPS, No motor tics  Speech-normal rate, amount.   Mood/Affect: Blunted  Thought Processes: associations intact  Thought Content: negativistic   Hallucination(s): none  Hopelessness: No  Optimistic:minimally  Suicidal Thoughts:  none  Suicidal Plan/Intent: none  Homicidal Thoughts:  absent  Orientation: oriented x 3  Memory: recent intact    Lab Results (last 24 hours)     Procedure Component Value Units Date/Time    Vitamin D 1,25 Dihydroxy [[id number]] Collected:  18    Specimen:  Blood Updated:  18 1522     1,25-Dihydroxy, Vitamin D 37.5 pg/mL     Narrative:       Performed at:  [address] [place] [address]  [id number]  : Dipak Hebert MD, Phone:  [phone]           Imaging Results (last 24 hours)     ** No results found for the last 24 hours. ** "           ECG/EMG Results (most recent)     Procedure Component Value Units Date/Time    ECG 12 Lead [[id number]] Collected:  06/09/18 0823     Updated:  06/11/18 0929    Narrative:       Test Reason : Potential adverse reaction to medications.  Blood Pressure : **/** mmHG  Vent. Rate : 085 BPM     Atrial Rate : 085 BPM     P-R Int : 128 ms          QRS Dur : 076 ms      QT Int : 392 ms       P-R-T Axes : 053 057 063 degrees     QTc Int : 466 ms    Normal sinus rhythm  T wave abnormality, consider anterior ischemia    Abnormal ECG  When compared with ECG of 26-DEC-2017 16:46,  No significant change was found  Confirmed by Luciano Cummings ([id number]) on 6/11/2018 9:28:46 AM    Referred By:  FAUSTINO           Confirmed By:Luciano Cummings           ALLERGIES: Elavil [amitriptyline]      Current Facility-Administered Medications:   •  acetaminophen (TYLENOL) tablet 650 mg, 650 mg, Oral, Q4H PRN, Braden Avalos MD, 650 mg at 06/13/18 2022  •  aluminum-magnesium hydroxide-simethicone (MAALOX MAX) 400-400-40 MG/5ML suspension 15 mL, 15 mL, Oral, Q6H PRN, Braden Avalos MD  •  benzonatate (TESSALON) capsule 100 mg, 100 mg, Oral, TID PRN, Braden Avalos MD  •  benztropine (COGENTIN) tablet 1 mg, 1 mg, Oral, Daily PRN **OR** benztropine (COGENTIN) injection 0.5 mg, 0.5 mg, Intramuscular, Daily PRN, Braden Avalos MD  •  famotidine (PEPCID) tablet 20 mg, 20 mg, Oral, BID PRN, Braden Avalos MD  •  fluPHENAZine (PROLIXIN) tablet 5 mg, 5 mg, Oral, BID, Gumaro Avalos MD, 5 mg at 06/13/18 2022  •  hydrOXYzine (ATARAX) tablet 50 mg, 50 mg, Oral, Q6H PRN, Braden Avalos MD, 50 mg at 06/13/18 2022  •  magnesium hydroxide (MILK OF MAGNESIA) suspension 2400 mg/10mL 10 mL, 10 mL, Oral, Daily PRN, rBaden Avalos MD  •  nicotine (NICODERM CQ) 21 MG/24HR patch 1 patch, 1 patch, Transdermal, Q24H, Braden Avalos MD, 1 patch at 06/13/18 0833  •  ondansetron (ZOFRAN) tablet 4 mg, 4 mg, Oral, Q6H PRN, Barden Avalos,  MD  •  QUEtiapine (SEROquel) tablet 100 mg, 100 mg, Oral, Nightly, Braden Avalos MD, 100 mg at 06/13/18 2022  •  sodium chloride (OCEAN) nasal spray 2 spray, 2 spray, Each Nare, PRN, Braden Avalos MD  •  traZODone (DESYREL) tablet 50 mg, 50 mg, Oral, Nightly PRN, Braden Avalos MD    ASSESSMENT & PLAN    Diagnosis   • Fibromyalgia Plan: Treat symptomatically avoiding opiates.     • Essential hypertension Plan: At this point patient normotensive without medications, will monitor pros respectively    • Schizophrenia, paranoid  Plan: have changed to Prolixin while continuing the Seroquel at bedtime , appreciate the collateral information from family.    • MDD (major depressive disorder), recurrent, severe, with psychosis Plan: At this point patient only moderately depressed denying any suicidal thoughts or intent.  Will stay with the Seroquel.            Suicide precautions: Suicide precaution Level 3 (q15 min checks)     Behavioral Health Treatment Plan and Problem List: I have reviewed and approved the Behavioral Health Treatment Plan and Problem list.    Clinician:  Gumaro Avalos MD  06/14/18  8:15 AM    Dictated utilizing Dragon dictation

## 2018-06-14 NOTE — NURSING NOTE
Received call from Dr. Perea, Cardiologist. He wants to wait and see the results of echo before making any decisions. He states he can rules out MVP from that result.

## 2018-06-15 PROBLEM — F33.9 MAJOR DEPRESSIVE DISORDER, RECURRENT (HCC): Status: ACTIVE | Noted: 2018-06-09

## 2018-06-15 PROCEDURE — 25010000002 FLUPHENAZINE DECANOATE PER 25 MG: Performed by: PSYCHIATRY & NEUROLOGY

## 2018-06-15 PROCEDURE — 99239 HOSP IP/OBS DSCHRG MGMT >30: CPT | Performed by: PSYCHIATRY & NEUROLOGY

## 2018-06-15 RX ORDER — FLUPHENAZINE HYDROCHLORIDE 5 MG/1
5 TABLET ORAL DAILY
Qty: 30 TABLET | Refills: 0 | Status: SHIPPED | OUTPATIENT
Start: 2018-06-15 | End: 2018-08-08 | Stop reason: SDUPTHER

## 2018-06-15 RX ORDER — QUETIAPINE FUMARATE 100 MG/1
100 TABLET, FILM COATED ORAL NIGHTLY
Qty: 30 TABLET | Refills: 0 | Status: SHIPPED | OUTPATIENT
Start: 2018-06-15 | End: 2018-08-08 | Stop reason: SDUPTHER

## 2018-06-15 RX ORDER — FLUPHENAZINE DECANOATE 25 MG/ML
12.5 INJECTION, SOLUTION INTRAMUSCULAR; SUBCUTANEOUS ONCE
Status: COMPLETED | OUTPATIENT
Start: 2018-06-15 | End: 2018-06-15

## 2018-06-15 RX ADMIN — FLUPHENAZINE DECANOATE 12.5 MG: 25 INJECTION, SOLUTION INTRAMUSCULAR; SUBCUTANEOUS at 10:14

## 2018-06-15 RX ADMIN — ACETAMINOPHEN 650 MG: 325 TABLET, FILM COATED ORAL at 08:44

## 2018-06-15 RX ADMIN — NICOTINE 1 PATCH: 21 PATCH TRANSDERMAL at 08:44

## 2018-06-15 NOTE — DISCHARGE SUMMARY
"  Date of Discharge:  6/15/2018    Discharge Diagnosis:Principal Problem:    Schizophrenia, paranoid  Active Problems:    Fibromyalgia    Essential hypertension    Major depressive disorder, recurrent        Presenting Problem/History of Present Illness: Patient presented in the emergency room bizarre suspicious with apparent delusional thought content, admitted to facilitate further evaluation and stabilization safety and treatment, see history of present illness in admission note for further details.      Hospital Course:  Patient was admitted for safety and stabilization and was placed on standard precautions.  Routine labs were checked.  Patient was assigned a masters level therapist and provided with an opportunity to participate in group and individual therapy on the unit.  Patient seen on a daily basis for evaluation and supportive therapy.  Has to negotiate with the patient regarding medications for she had her reservations about taking psychotropic medications lacking insight into her illness.  Her depression was minimal and she was denying any thoughts of harming herself or others throughout her hospital stay.  At times was felt to be experiencing active hallucinatory phenomena as observed but patient was denying such.  Remained apart from that other patients and staff appearing to be suspicious at times.  Patient had a complaint of \"panic attacks\" that certainly were not apparent objectively, talked of having vague chest pain.  Cardiac consult requested but not achieved, did order echocardiogram which was normal, no MVP.  Patient will be given an appointment to see a cardiologist as an outpatient.  She was  agreeable with current medication format that included IM Depakote Prolixin Decanoate (patient would not accept Abilify or Invega) given 12.5 mg IM day of discharge Fabi 15.  Patient has a history of noncompliance.  Oral medications prescribed at discharge included 5 mg Prolixin and 100 mg of Seroquel " to take nightly.  Patient was free of any demonstrated persecutory delusions, hallucinatory phenomena or other formal thought disorder, she was not experiencing any thoughts to harm self or others at discharge.  Patient was planning to stay with her mother immediately post hospital and was scheduled to follow-up with providers at the primary care Center in [place].    Consults:   Consults     Date and Time Order Name Status Description    6/12/2018 0808 Inpatient Cardiology Consult            Labs:  Lab Results (all)     Procedure Component Value Units Date/Time    Vitamin D 1,25 Dihydroxy [[id number]] Collected:  06/11/18 0527    Specimen:  Blood Updated:  06/13/18 1522     1,25-Dihydroxy, Vitamin D 37.5 pg/mL     Narrative:       Performed at:  [facility]  [id number]  : Dipak Hebert MD, Phone:  [phone]    Basic Metabolic Panel [[id number]]  (Abnormal) Collected:  06/11/18 0527    Specimen:  Blood Updated:  06/11/18 0635     Glucose 87 mg/dL      BUN 14 mg/dL      Creatinine 0.76 mg/dL      Sodium 140 mmol/L      Potassium 3.8 mmol/L      Chloride 114 (H) mmol/L      CO2 25.4 mmol/L      Calcium 9.0 mg/dL      eGFR Non African Amer 87 mL/min/1.73      BUN/Creatinine Ratio 18.4     Anion Gap 0.6 (L) mmol/L     Narrative:       GFR Normal >60  Chronic Kidney Disease <60  Kidney Failure <15    Osmolality, Calculated [[id number]]  (Normal) Collected:  06/11/18 0527    Specimen:  Blood Updated:  06/11/18 0635     Osmolality Calc 279.2 mOsm/kg     TSH [[id number]]  (Abnormal) Collected:  06/11/18 0526    Specimen:  Blood Updated:  06/11/18 0631     TSH 0.346 (L) mIU/mL     Vitamin B12 [[id number]]  (Normal) Collected:  06/11/18 0526    Specimen:  Blood Updated:  06/11/18 0631     Vitamin B-12 522 pg/mL     Lipid Panel [[id number]]  (Abnormal) Collected:  06/11/18 0527    Specimen:  Blood Updated:  06/11/18 0626     Total Cholesterol 135 mg/dL       Triglycerides 61 mg/dL      HDL Cholesterol 50 (L) mg/dL      LDL Cholesterol  73 mg/dL      VLDL Cholesterol 12.2 mg/dL      LDL/HDL Ratio 1.46    Narrative:       Cholesterol Reference Ranges  (U.S. Department of Health and Human Services ATP III Classifications)    Desirable          <200 mg/dL  Borderline High    200-239 mg/dL  High Risk          >240 mg/dL      Triglyceride Reference Ranges  (U.S. Department of Health and Human Services ATP III Classifications)    Normal           <150 mg/dL  Borderline High  150-199 mg/dL  High             200-499 mg/dL  Very High        >500 mg/dL    HDL Reference Ranges  (U.S. Department of Health and Human Services ATP III Classifcations)    Low     <40 mg/dl (major risk factor for CHD)  High    >60 mg/dl ('negative' risk factor for CHD)        LDL Reference Ranges  (U.S. Department of Health and Human Services ATP III Classifcations)    Optimal          <100 mg/dL  Near Optimal     100-129 mg/dL  Borderline High  130-159 mg/dL  High             160-189 mg/dL  Very High        >189 mg/dL          Imaging:  Imaging Results (all)     None                  Condition on Discharge:  improved    Prognosis: fair.    Vital Signs  Temp:  [97.9 °F (36.6 °C)] 97.9 °F (36.6 °C)  Heart Rate:  [98] 98  Resp:  [18] 18  BP: (112)/(76) 112/76    Discharge Disposition  Home or Self Care    Discharge Medications     Discharge Medications      New Medications      Instructions Start Date   fluPHENAZine 5 MG tablet  Commonly known as:  PROLIXIN   5 mg, Oral, Daily         Changes to Medications      Instructions Start Date   QUEtiapine 100 MG tablet  Commonly known as:  SEROquel  What changed:  · medication strength  · how much to take   100 mg, Oral, Nightly         Stop These Medications    mirtazapine 15 MG tablet  Commonly known as:  REMERON     oxyCODONE 15 MG immediate release tablet  Commonly known as:  ROXICODONE     oxyMORphone ER 10 MG tablet extended-release 12 hour 12 hr  tablet  Commonly known as:  OPANA ER     trifluoperazine 5 MG tablet  Commonly known as:  STELAZINE            Discharge Diet: regular    Activity at Discharge: no restrictions    Follow-up Appointments: Patient to follow-up at the [facility] [place] to see therapist and a psychiatric APRN.  Also to have an appointment to see a cardiologist.  All are to be arranged prior to discharge.        Gumaro Avalos MD  06/15/18  8:18 AM  Time spent with the discharge process >30 minutes.     Dictated utilizing Dragon dictation

## 2018-06-15 NOTE — PLAN OF CARE
Problem: Patient Care Overview  Goal: Plan of Care Review  Outcome: Ongoing (interventions implemented as appropriate)   06/15/18 0214   Coping/Psychosocial   Plan of Care Reviewed With patient   Coping/Psychosocial   Patient Agreement with Plan of Care agrees   Plan of Care Review   Progress improving   OTHER   Outcome Summary pt alert and verbal; up in hallway this evening; rates anxiety 2; depression 2; denies AVH; cooperative; denies SI/HI; pt states she is feeling better this evening and hopeful to go home tomorrow after speaking to doctor

## 2018-06-15 NOTE — PLAN OF CARE
Problem: Patient Care Overview  Goal: Discharge Needs Assessment  Outcome: Outcome(s) achieved Date Met: 06/15/18   06/09/18 1621 06/15/18 1151   Discharge Needs Assessment   Readmission Within the Last 30 Days no previous admission in last 30 days --    Concerns to be Addressed cognitive/perceptual;mental health --    Patient/Family Anticipates Transition to home --    Patient/Family Anticipated Services at Transition --  mental health services;outpatient care   Transportation Anticipated --  family or friend will provide   Patient's Choice of Community Agency(s) --  [facility]   Current Discharge Risk --  psychiatric illness   Discharge Coordination/Progress Patient has insurance for medication and reports no issues with transportation. --    Discharge Needs Assessment,    Outpatient/Agency/Support Group Needs --  outpatient medication management;outpatient counseling;outpatient psychiatric care (specify)   Anticipated Discharge Disposition --  home or self-care     DATA: Met with patient this morning and she reports feeling better.  She reports she is ready to return home.  She requests her medication be sent to [facility].  She also requested to have aftercare with [facility] with Rodrigo Chanel LCSW and with an APRN for medication management.  She was not agreeable to begin the injectable medication but reports she will be staying on the regimen that she has started while here in the hospital and reports that the medications have been helpful to her.      ASSESSMENT:  Patient reports feeling better today with decreased depression and anxiety.  Patient is denying suicidal ideation and denying homicidal ideation.  Patient does not appear to be experiencing any psychosis today and appears stable for discharge.    PLAN:  Patient will return home today.  Patient reports her family will transport.  Patient has aftercare scheduled with [facility] for outpatient  behavioral health services.

## 2018-06-27 ENCOUNTER — TELEPHONE (OUTPATIENT)
Dept: PSYCHIATRY | Facility: CLINIC | Age: 33
End: 2018-06-27

## 2018-06-27 NOTE — TELEPHONE ENCOUNTER
Cintia was wondering what heart specialist you referred her to because she lost all of her information for it. She said she saw you inpatient.

## 2018-06-28 NOTE — TELEPHONE ENCOUNTER
"Cannot fine where it was documented that the nursing actually did arrange for outpatient cardiac appointment as requested, could suggest referring patient to Dr. Perea for \"chest pain\" if she wants such a referral.  "

## 2018-08-08 ENCOUNTER — OFFICE VISIT (OUTPATIENT)
Dept: PSYCHIATRY | Facility: CLINIC | Age: 33
End: 2018-08-08

## 2018-08-08 VITALS
SYSTOLIC BLOOD PRESSURE: 148 MMHG | HEIGHT: 66 IN | DIASTOLIC BLOOD PRESSURE: 101 MMHG | WEIGHT: 168.3 LBS | BODY MASS INDEX: 27.05 KG/M2 | HEART RATE: 88 BPM

## 2018-08-08 DIAGNOSIS — F25.9 SCHIZOAFFECTIVE DISORDER, UNSPECIFIED TYPE (HCC): Primary | ICD-10-CM

## 2018-08-08 PROCEDURE — 99214 OFFICE O/P EST MOD 30 MIN: CPT | Performed by: NURSE PRACTITIONER

## 2018-08-08 RX ORDER — FLUPHENAZINE HYDROCHLORIDE 5 MG/1
5 TABLET ORAL DAILY
Qty: 30 TABLET | Refills: 0 | Status: SHIPPED | OUTPATIENT
Start: 2018-08-08 | End: 2018-09-05 | Stop reason: SDUPTHER

## 2018-08-08 RX ORDER — MIRTAZAPINE 15 MG/1
15 TABLET, FILM COATED ORAL NIGHTLY
Qty: 30 TABLET | Refills: 0 | Status: SHIPPED | OUTPATIENT
Start: 2018-08-08 | End: 2018-09-05 | Stop reason: SDUPTHER

## 2018-08-08 RX ORDER — QUETIAPINE FUMARATE 100 MG/1
100 TABLET, FILM COATED ORAL NIGHTLY
Qty: 30 TABLET | Refills: 0 | Status: SHIPPED | OUTPATIENT
Start: 2018-08-08 | End: 2018-09-05 | Stop reason: SDUPTHER

## 2018-08-08 NOTE — PROGRESS NOTES
Subjective   Cintia Issa is a 33 y.o. female is here today for medication management follow-up after she was admitted to the [facility] for psychosis.    Chief Complaint: Follow up with psychosis    History of Present Illness She states that she was having problems because she became extremely nervous when her brother was over her house and she got scared.  She states that she had a panic attack which led her to the ER.  She states that she was previously on stelazine but she was changed to prolixin and she felt like her symptoms have been better with it.  Therefore, will continue the oral prolixin at this point.  She is aware that if symptoms get worse then will go back to injections. She denies any side effects from the medications.  She states that she is stressed out about her financial issues and lack of transportation.  She states that she is not able to work because of an injured back. She rates her depression and anxiety 6/10 with 10 being the worse.  She states that she feels overwhelmed.  She states that she is sleeping ok with the melatonin, she states that she finds it hard to go back to sleep because she has a dog that causes her to wake. She is averaging about 7 hours per night.  She states that she is healthy ok, recommended that she eat healthy and try to exercise.  Body mass index is 27.18 kg/m².   She states that she has chronic pain that also interrupts her sleep.  Denies any AV hallucinations, denies any paranoia.  Denies any SI/HI.       The following portions of the patient's history were reviewed and updated as appropriate: allergies, current medications, past family history, past medical history, past social history, past surgical history and problem list.    Review of Systems   Constitutional: Negative for appetite change, chills, diaphoresis, fatigue, fever and unexpected weight change.   HENT: Negative for hearing loss, sore throat, trouble swallowing and voice change.   "  Eyes: Negative for photophobia and visual disturbance.   Respiratory: Negative for cough, chest tightness and shortness of breath.    Cardiovascular: Negative for chest pain and palpitations.   Gastrointestinal: Negative for abdominal pain, constipation, nausea and vomiting.   Endocrine: Negative for cold intolerance and heat intolerance.   Genitourinary: Negative for dysuria and frequency.   Musculoskeletal: Positive for arthralgias and back pain. Negative for joint swelling and neck stiffness.   Skin: Negative for color change and wound.   Allergic/Immunologic: Negative for environmental allergies and immunocompromised state.   Neurological: Negative for dizziness, tremors, seizures, syncope, weakness, light-headedness and headaches.   Hematological: Negative for adenopathy. Does not bruise/bleed easily.       Objective   Physical Exam   Constitutional: She appears well-developed and well-nourished. No distress.   Neurological: She is alert. Coordination and gait normal.   Vitals reviewed.    Blood pressure (!) 148/101, pulse 88, height 167.6 cm (65.98\"), weight 76.3 kg (168 lb 4.8 oz).    Medication List:   Current Outpatient Prescriptions   Medication Sig Dispense Refill   • fluPHENAZine (PROLIXIN) 5 MG tablet Take 1 tablet by mouth Daily. 30 tablet 0   • mirtazapine (REMERON) 15 MG tablet Take 1 tablet by mouth Every Night. 30 tablet 0   • QUEtiapine (SEROquel) 100 MG tablet Take 1 tablet by mouth Every Night. 30 tablet 0     No current facility-administered medications for this visit.        Mental Status Exam:   Hygiene:   good  Cooperation:  Guarded  Eye Contact:  Fair  Psychomotor Behavior:  Appropriate  Affect:  Appropriate  Hopelessness: Denies  Speech:  Minimal  Thought Process:  Linear  Thought Content:  Mood congurent  Suicidal:  None  Homicidal:  None  Hallucinations:  None  Delusion:  None  Memory:  Intact  Orientation:  Person, Place, Time and Situation  Reliability:  fair  Insight:  " Fair  Judgement:  Fair  Impulse Control:  Fair  Physical/Medical Issues:  No     Assessment/Plan   Problems Addressed this Visit     None      Visit Diagnoses     Schizoaffective disorder, unspecified type (CMS/HCC)    -  Primary    Relevant Medications    fluPHENAZine (PROLIXIN) 5 MG tablet    QUEtiapine (SEROquel) 100 MG tablet    mirtazapine (REMERON) 15 MG tablet        Discussed medication options.  Reviewed the risks, benefits, and side effects of the medications; patient acknowledged and verbally consented.  Patient is agreeable to call the [facility].  Patient is aware to call 911 or go to the nearest ER should begin having SI/HI.     Return in 4 weeks, reschedule for therapy.

## 2018-08-09 ENCOUNTER — PRIOR AUTHORIZATION (OUTPATIENT)
Dept: PSYCHIATRY | Facility: CLINIC | Age: 33
End: 2018-08-09

## 2018-08-09 ENCOUNTER — TELEPHONE (OUTPATIENT)
Dept: FAMILY MEDICINE CLINIC | Facility: CLINIC | Age: 33
End: 2018-08-09

## 2018-08-24 ENCOUNTER — OFFICE VISIT (OUTPATIENT)
Dept: PSYCHIATRY | Facility: CLINIC | Age: 33
End: 2018-08-24

## 2018-08-24 DIAGNOSIS — F25.9 SCHIZOAFFECTIVE DISORDER, UNSPECIFIED TYPE (HCC): Primary | ICD-10-CM

## 2018-08-24 PROCEDURE — 90832 PSYTX W PT 30 MINUTES: CPT | Performed by: SOCIAL WORKER

## 2018-08-27 NOTE — PROGRESS NOTES
"Date of Service: August 24, 2018  Time In: 12:40 PM  Time Out: 1:05 PM      PROGRESS NOTE  Data:  Cintia Issa is a 33 y.o. female who met in: 1 with the undersigned for a regularly scheduled individual outpatient therapy session at [facility].  The patient's appointment follows her hospitalization recently at the [facility].  Patient is also seeing ROBIN Kerns for pharmacotherapy.     HPI: Patient reports she continues to struggle with significant anxiety/panic including feeling on age, trembling, feeling overwhelmed, sweating, increased heart rate, and a significant sense of impending doom.  Patient rates current symptoms at a 6 on a scale of 1-10 with 10 being most severe.  Patient also reports she continues to struggle with believing people are watching her and also states she has been fearful that other people are \"speaking death over her\".  Patient adamantly and convincingly denies subjective perceptual disturbance but continues to report various delusions including feeling someone walks around her house at night and looks through her windows.  He does report she feels she is doing somewhat better following her hospitalization and states she is currently working on getting a car which would decrease her social isolation.  He reports he continues to adhere to medication regimen as prescribed.  Patient adamantly convincingly denies suicidal ideation and vehemently denies any substance use.      Clinical Maneuvering/Intervention:  Assisted patient in processing above session content; acknowledged and normalized patient’s thoughts, feelings, and concerns.  Discussed the therapist/patient relationship and explain the parameters and limitations of relative confidentiality.  Also discussed the importance of regular attendance, active participation, and honesty to the treatment process.  Utilized motivational interviewing techniques including complex reflections to " assist the patient in verbalizing the importance of continuing to adhere to medication regimen as prescribed.  Also utilized cognitive behavioral therapy to challenge the patient's irrational fears and encouraged her to consider engaging in activities which would decrease her idle time.  Patient appears to be open to the possibility some of the things she fears are not happening, however, she continues to be focused on various delusional ideas.  Discussed and demonstrated thought blocking techniques and encourage the patient to find activities to reduce her likelihood of obsessive thoughts.  Provided unconditional positive regard safe, supportive environment.    Allowed patient to freely discuss issues without interruption or judgment. Provided safe, confidential environment to facilitate the development of positive therapeutic relationship and encourage open, honest communication. Assisted patient in identifying risk factors which would indicate the need for higher level of care including thoughts to harm self or others and/or self-harming behavior and encouraged patient to contact this office, call 911, or present to the nearest emergency room should any of these events occur. Discussed crisis intervention services and means to access.  Patient adamantly and convincingly denies current suicidal or homicidal ideation or perceptual disturbance.    Assessment     Diagnoses and all orders for this visit:    Schizoaffective disorder, unspecified type (CMS/HCC)               Mental Status Exam  Hygiene:  good  Dress:  casual  Attitude:  Cooperative  Motor Activity:  Restless  Speech:  Pressured  Mood:  anxious  Affect:  anxious  Thought Processes:  Pressured  Thought Content:  paranoid ideation  Suicidal Thoughts:  denies  Homicidal Thoughts:  denies  Crisis Safety Plan: yes, to come to the emergency room.  Hallucinations:  denies    Patient's Support Network Includes:  mother and extended family    Progress toward  goal: Not at goal    Functional Status: Moderate impairment     Prognosis: Guarded with Ongoing Treatment    Plan         She will continue in individual outpatient therapy session at [facility] in 3 weeks and will continue and pharmacotherapy as scheduled with ROBIN Kerns.  Patient will adhere to medication regimen as prescribed and report any side effects. Patient will contact this office, call 911 or present to the nearest emergency room should suicidal or homicidal ideations occur. Provide Cognitive Behavioral Therapy and Integrative Therapy to improve functioning, maintain stability, and avoid decompensation and the need for higher level of care.          Return in about 3 weeks (around 9/14/2018) for Next scheduled follow up.      This document signed by Rodrigo Chanel LCSW, PRANAV August 27, 2018 1:20 PM

## 2018-09-05 ENCOUNTER — OFFICE VISIT (OUTPATIENT)
Dept: PSYCHIATRY | Facility: CLINIC | Age: 33
End: 2018-09-05

## 2018-09-05 VITALS
BODY MASS INDEX: 26.68 KG/M2 | WEIGHT: 166 LBS | HEART RATE: 111 BPM | DIASTOLIC BLOOD PRESSURE: 83 MMHG | HEIGHT: 66 IN | SYSTOLIC BLOOD PRESSURE: 133 MMHG

## 2018-09-05 DIAGNOSIS — F25.9 SCHIZOAFFECTIVE DISORDER, UNSPECIFIED TYPE (HCC): Primary | ICD-10-CM

## 2018-09-05 PROCEDURE — 99214 OFFICE O/P EST MOD 30 MIN: CPT | Performed by: NURSE PRACTITIONER

## 2018-09-05 RX ORDER — QUETIAPINE FUMARATE 100 MG/1
100 TABLET, FILM COATED ORAL NIGHTLY
Qty: 30 TABLET | Refills: 0 | Status: SHIPPED | OUTPATIENT
Start: 2018-09-05 | End: 2018-10-17 | Stop reason: SDUPTHER

## 2018-09-05 RX ORDER — MIRTAZAPINE 15 MG/1
15 TABLET, FILM COATED ORAL NIGHTLY
Qty: 30 TABLET | Refills: 0 | Status: SHIPPED | OUTPATIENT
Start: 2018-09-05 | End: 2018-10-17 | Stop reason: SDUPTHER

## 2018-09-05 RX ORDER — HYDROXYZINE PAMOATE 25 MG/1
25 CAPSULE ORAL 3 TIMES DAILY PRN
Qty: 90 CAPSULE | Refills: 0 | Status: SHIPPED | OUTPATIENT
Start: 2018-09-05 | End: 2018-10-17 | Stop reason: SDUPTHER

## 2018-09-05 RX ORDER — FLUPHENAZINE HYDROCHLORIDE 5 MG/1
5 TABLET ORAL DAILY
Qty: 30 TABLET | Refills: 0 | Status: SHIPPED | OUTPATIENT
Start: 2018-09-05 | End: 2018-10-17 | Stop reason: SDUPTHER

## 2018-09-05 NOTE — PROGRESS NOTES
Subjective   Cintia Issa is a 33 y.o. female is here today for medication management follow-up after she was admitted to the [facility] for psychosis.    Chief Complaint: Follow up with psychosis    History of Present Illness She states that she has been having a rough night and day, she states that her aunt has been on a vent for cancer but don't know if she will make it.  She states that she has had a couple of panic attacks with the health of her aunt.  She states that she has been taking her medications as prescribe, but feels like there is something else because she doesn't feel like it is helping that much.  She states that she wish she could figure out what was going on, she states that she is having different pain throughout her body.  She states that she has not had any side effects from the medications, she has been on the medications since this summer.  She rates her depression and anxiety rate abut 5/10 with 10 being the worse.  She feels more anxious, she shares that she use to be on hydroxyzine and it seemed to be helpful.  She was on alprazolam that she put under her tongue and it helped a lot.  She shares that she is averaging about 8-10 hours per night with her medications, denies any SE.  She states that she was close to her aunt but she has not been that close since she moved out of town, she worries about her mother.  She states that her appetite is good with slight weight loss.  Body mass index is 26.81 kg/m².  Recommended that she eat healthy and try to exercise.  She states that she has an appointment with her PCP coming up in the near future but states that everything if physically ok.  Denies any AV hallucinations, denies any SI/HI.      The following portions of the patient's history were reviewed and updated as appropriate: allergies, current medications, past family history, past medical history, past social history, past surgical history and problem list.    Review of  "Systems   Constitutional: Negative for appetite change, chills, diaphoresis, fatigue, fever and unexpected weight change.   HENT: Negative for hearing loss, sore throat, trouble swallowing and voice change.    Eyes: Negative for photophobia and visual disturbance.   Respiratory: Negative for cough, chest tightness and shortness of breath.    Cardiovascular: Negative for chest pain and palpitations.   Gastrointestinal: Negative for abdominal pain, constipation, nausea and vomiting.   Endocrine: Negative for cold intolerance and heat intolerance.   Genitourinary: Negative for dysuria and frequency.   Musculoskeletal: Positive for arthralgias and back pain. Negative for joint swelling and neck stiffness.   Skin: Negative for color change and wound.   Allergic/Immunologic: Negative for environmental allergies and immunocompromised state.   Neurological: Negative for dizziness, tremors, seizures, syncope, weakness, light-headedness and headaches.   Hematological: Negative for adenopathy. Does not bruise/bleed easily.       Objective   Physical Exam   Constitutional: She appears well-developed and well-nourished. No distress.   Neurological: She is alert. Coordination and gait normal.   Vitals reviewed.    Blood pressure 133/83, pulse 111, height 167.6 cm (65.98\"), weight 75.3 kg (166 lb).    Medication List:   Current Outpatient Prescriptions   Medication Sig Dispense Refill   • fluPHENAZine (PROLIXIN) 5 MG tablet Take 1 tablet by mouth Daily. 30 tablet 0   • hydrOXYzine (VISTARIL) 25 MG capsule Take 1 capsule by mouth 3 (Three) Times a Day As Needed for Anxiety. 90 capsule 0   • mirtazapine (REMERON) 15 MG tablet Take 1 tablet by mouth Every Night. 30 tablet 0   • QUEtiapine (SEROquel) 100 MG tablet Take 1 tablet by mouth Every Night. 30 tablet 0     No current facility-administered medications for this visit.        Mental Status Exam:   Hygiene:   good  Cooperation:  Guarded  Eye Contact:  Fair  Psychomotor Behavior:  " Appropriate  Affect:  Appropriate  Hopelessness: Denies  Speech:  Minimal  Thought Process:  Linear  Thought Content:  Mood congurent  Suicidal:  None  Homicidal:  None  Hallucinations:  None  Delusion:  None  Memory:  Intact  Orientation:  Person, Place, Time and Situation  Reliability:  fair  Insight:  Fair  Judgement:  Fair  Impulse Control:  Fair  Physical/Medical Issues:  No     Assessment/Plan   Problems Addressed this Visit     None      Visit Diagnoses     Schizoaffective disorder, unspecified type (CMS/HCC)    -  Primary    Relevant Medications    fluPHENAZine (PROLIXIN) 5 MG tablet    mirtazapine (REMERON) 15 MG tablet    QUEtiapine (SEROquel) 100 MG tablet    hydrOXYzine (VISTARIL) 25 MG capsule        Discussed medication options.  Reviewed the risks, benefits, and side effects of the medications; patient acknowledged and verbally consented.  Patient is agreeable to call the [facility].  Patient is aware to call 911 or go to the nearest ER should begin having SI/HI.     Prognosis: Guarded dependent on medication, follow up appointment and treatment plan compliance     Functionality: Fair.Depression seems to be impacting her motivation and energy levels causing the needed for excessive sleep.        Return in 4 weeks, reschedule for therapy.

## 2018-10-17 ENCOUNTER — OFFICE VISIT (OUTPATIENT)
Dept: PSYCHIATRY | Facility: CLINIC | Age: 33
End: 2018-10-17

## 2018-10-17 VITALS
SYSTOLIC BLOOD PRESSURE: 117 MMHG | BODY MASS INDEX: 27.03 KG/M2 | DIASTOLIC BLOOD PRESSURE: 76 MMHG | WEIGHT: 168.2 LBS | HEART RATE: 83 BPM | HEIGHT: 66 IN

## 2018-10-17 DIAGNOSIS — F25.9 SCHIZOAFFECTIVE DISORDER, UNSPECIFIED TYPE (HCC): Primary | ICD-10-CM

## 2018-10-17 PROCEDURE — 99214 OFFICE O/P EST MOD 30 MIN: CPT | Performed by: NURSE PRACTITIONER

## 2018-10-17 RX ORDER — MIRTAZAPINE 15 MG/1
15 TABLET, FILM COATED ORAL NIGHTLY
Qty: 30 TABLET | Refills: 1 | Status: SHIPPED | OUTPATIENT
Start: 2018-10-17 | End: 2018-11-19 | Stop reason: HOSPADM

## 2018-10-17 RX ORDER — QUETIAPINE FUMARATE 100 MG/1
100 TABLET, FILM COATED ORAL NIGHTLY
Qty: 30 TABLET | Refills: 1 | Status: SHIPPED | OUTPATIENT
Start: 2018-10-17 | End: 2018-11-19 | Stop reason: HOSPADM

## 2018-10-17 RX ORDER — FLUPHENAZINE HYDROCHLORIDE 5 MG/1
5 TABLET ORAL DAILY
Qty: 30 TABLET | Refills: 1 | Status: SHIPPED | OUTPATIENT
Start: 2018-10-17 | End: 2018-11-19 | Stop reason: HOSPADM

## 2018-10-17 RX ORDER — HYDROXYZINE PAMOATE 50 MG/1
50 CAPSULE ORAL 3 TIMES DAILY PRN
Qty: 90 CAPSULE | Refills: 0 | Status: SHIPPED | OUTPATIENT
Start: 2018-10-17 | End: 2018-11-19 | Stop reason: HOSPADM

## 2018-10-17 RX ORDER — HYDROXYZINE PAMOATE 25 MG/1
25 CAPSULE ORAL 3 TIMES DAILY PRN
Qty: 90 CAPSULE | Refills: 1 | Status: SHIPPED | OUTPATIENT
Start: 2018-10-17 | End: 2018-10-17 | Stop reason: SDUPTHER

## 2018-10-17 NOTE — PROGRESS NOTES
"  Subjective   Cintia Issa is a 33 y.o. female is here today for medication management follow-up after she was admitted to the [facility] for psychosis.    Chief Complaint: Follow up with psychosis    History of Present Illness She states that she is doing ok; having some headaches- she was previously on Topamax from her PCP and she plans on calling and informing him of it.  She states that she is taking all of her medications with no SE or problems.  She states that her depression is not as bad as it was, she rates her depression between 0-1/10 with 10 being the worse.  She states that she has been taking the hydroxyzine as needed for her anxiety but feels like it could be increased.  She rates her anxiety about 5/10 with 10 being the worse.  She states that she is sleeping \"pretty good\", she states that she tends to wake up but is able to go back to sleep; she states that she occasionally has NM; she states that she is getting about 8-10 hours per night.  She states that her appetite is good; she states that she wants to lose between 8-10 pounds, recommended that she eat healthy and exercise when she can.  She states that she is dealing with everyday situations- washer quit working, financial issues.  She denies any recent illness.  Denies any AV hallucinations, denies any SI/HI.      The following portions of the patient's history were reviewed and updated as appropriate: allergies, current medications, past family history, past medical history, past social history, past surgical history and problem list.    Review of Systems   Constitutional: Negative for appetite change, chills, diaphoresis, fatigue, fever and unexpected weight change.   HENT: Negative for hearing loss, sore throat, trouble swallowing and voice change.    Eyes: Negative for photophobia and visual disturbance.   Respiratory: Negative for cough, chest tightness and shortness of breath.    Cardiovascular: Negative for chest pain and " "palpitations.   Gastrointestinal: Negative for abdominal pain, constipation, nausea and vomiting.   Endocrine: Negative for cold intolerance and heat intolerance.   Genitourinary: Negative for dysuria and frequency.   Musculoskeletal: Positive for arthralgias and back pain. Negative for joint swelling and neck stiffness.   Skin: Negative for color change and wound.   Allergic/Immunologic: Negative for environmental allergies and immunocompromised state.   Neurological: Negative for dizziness, tremors, seizures, syncope, weakness, light-headedness and headaches.   Hematological: Negative for adenopathy. Does not bruise/bleed easily.       Objective   Physical Exam   Constitutional: She appears well-developed and well-nourished. No distress.   Neurological: She is alert. Coordination and gait normal.   Vitals reviewed.    Blood pressure 117/76, pulse 83, height 167.6 cm (65.98\"), weight 76.3 kg (168 lb 3.2 oz).    Medication List:   Current Outpatient Prescriptions   Medication Sig Dispense Refill   • fluPHENAZine (PROLIXIN) 5 MG tablet Take 1 tablet by mouth Daily. 30 tablet 1   • hydrOXYzine (VISTARIL) 25 MG capsule Take 1 capsule by mouth 3 (Three) Times a Day As Needed for Anxiety. 90 capsule 1   • mirtazapine (REMERON) 15 MG tablet Take 1 tablet by mouth Every Night. 30 tablet 1   • QUEtiapine (SEROquel) 100 MG tablet Take 1 tablet by mouth Every Night. 30 tablet 1     No current facility-administered medications for this visit.        Mental Status Exam:   Hygiene:   good  Cooperation:  Guarded  Eye Contact:  Fair  Psychomotor Behavior:  Appropriate  Affect:  Appropriate  Hopelessness: Denies  Speech:  Minimal  Thought Process:  Linear  Thought Content:  Mood congurent  Suicidal:  None  Homicidal:  None  Hallucinations:  None  Delusion:  None  Memory:  Intact  Orientation:  Person, Place, Time and Situation  Reliability:  fair  Insight:  Fair  Judgement:  Fair  Impulse Control:  Fair  Physical/Medical Issues:  " No     Assessment/Plan   Problems Addressed this Visit     None      Visit Diagnoses     Schizoaffective disorder, unspecified type (CMS/HCC)    -  Primary    Relevant Medications    fluPHENAZine (PROLIXIN) 5 MG tablet    hydrOXYzine (VISTARIL) 25 MG capsule    mirtazapine (REMERON) 15 MG tablet    QUEtiapine (SEROquel) 100 MG tablet        Discussed medication options.  Reviewed the risks, benefits, and side effects of the medications; patient acknowledged and verbally consented.  Patient is agreeable to call the [facility].  Patient is aware to call 911 or go to the nearest ER should begin having SI/HI.     Prognosis: Guarded dependent on medication, follow up appointment and treatment plan compliance     Functionality: Fair.Depression seems to be impacting her motivation and energy levels causing the needed for excessive sleep.        Return in 8 weeks

## 2018-11-13 ENCOUNTER — HOSPITAL ENCOUNTER (INPATIENT)
Facility: HOSPITAL | Age: 33
LOS: 6 days | Discharge: HOME | End: 2018-11-19
Attending: PSYCHIATRY & NEUROLOGY | Admitting: PSYCHIATRY & NEUROLOGY

## 2018-11-13 ENCOUNTER — HOSPITAL ENCOUNTER (EMERGENCY)
Facility: HOSPITAL | Age: 33
End: 2018-11-13
Attending: FAMILY MEDICINE | Admitting: FAMILY MEDICINE

## 2018-11-13 VITALS
HEIGHT: 66 IN | SYSTOLIC BLOOD PRESSURE: 130 MMHG | OXYGEN SATURATION: 99 % | HEART RATE: 81 BPM | BODY MASS INDEX: 22.5 KG/M2 | RESPIRATION RATE: 18 BRPM | DIASTOLIC BLOOD PRESSURE: 78 MMHG | TEMPERATURE: 98.1 F | WEIGHT: 140 LBS

## 2018-11-13 DIAGNOSIS — F41.9 ANXIETY: Primary | ICD-10-CM

## 2018-11-13 PROBLEM — F22 PSYCHOSIS, PARANOID: Status: ACTIVE | Noted: 2018-11-13

## 2018-11-13 LAB
6-ACETYL MORPHINE: POSITIVE
ALBUMIN SERPL-MCNC: 4.6 G/DL (ref 3.5–5)
ALBUMIN/GLOB SERPL: 1.6 G/DL (ref 1.5–2.5)
ALP SERPL-CCNC: 74 U/L (ref 35–104)
ALT SERPL W P-5'-P-CCNC: 25 U/L (ref 10–36)
AMPHET+METHAMPHET UR QL: NEGATIVE
ANION GAP SERPL CALCULATED.3IONS-SCNC: 1.9 MMOL/L (ref 3.6–11.2)
AST SERPL-CCNC: 29 U/L (ref 10–30)
B-HCG UR QL: NEGATIVE
BACTERIA UR QL AUTO: ABNORMAL /HPF
BARBITURATES UR QL SCN: NEGATIVE
BASOPHILS # BLD AUTO: 0.01 10*3/MM3 (ref 0–0.3)
BASOPHILS NFR BLD AUTO: 0.1 % (ref 0–2)
BENZODIAZ UR QL SCN: NEGATIVE
BILIRUB SERPL-MCNC: 0.4 MG/DL (ref 0.2–1.8)
BILIRUB UR QL STRIP: NEGATIVE
BUN BLD-MCNC: 6 MG/DL (ref 7–21)
BUN/CREAT SERPL: 5.8 (ref 7–25)
BUPRENORPHINE SERPL-MCNC: NEGATIVE NG/ML
CALCIUM SPEC-SCNC: 9.7 MG/DL (ref 7.7–10)
CANNABINOIDS SERPL QL: NEGATIVE
CHLORIDE SERPL-SCNC: 115 MMOL/L (ref 99–112)
CLARITY UR: ABNORMAL
CO2 SERPL-SCNC: 23.1 MMOL/L (ref 24.3–31.9)
COCAINE UR QL: NEGATIVE
COLOR UR: YELLOW
CREAT BLD-MCNC: 1.04 MG/DL (ref 0.43–1.29)
DEPRECATED RDW RBC AUTO: 42.1 FL (ref 37–54)
EOSINOPHIL # BLD AUTO: 0.02 10*3/MM3 (ref 0–0.7)
EOSINOPHIL NFR BLD AUTO: 0.1 % (ref 0–5)
ERYTHROCYTE [DISTWIDTH] IN BLOOD BY AUTOMATED COUNT: 12.7 % (ref 11.5–14.5)
ETHANOL BLD-MCNC: <10 MG/DL
ETHANOL UR QL: <0.01 %
GFR SERPL CREATININE-BSD FRML MDRD: 60 ML/MIN/1.73
GLOBULIN UR ELPH-MCNC: 2.9 GM/DL
GLUCOSE BLD-MCNC: 106 MG/DL (ref 70–110)
GLUCOSE UR STRIP-MCNC: NEGATIVE MG/DL
HCT VFR BLD AUTO: 42.5 % (ref 37–47)
HGB BLD-MCNC: 14.4 G/DL (ref 12–16)
HGB UR QL STRIP.AUTO: ABNORMAL
HYALINE CASTS UR QL AUTO: ABNORMAL /LPF
IMM GRANULOCYTES # BLD: 0.04 10*3/MM3 (ref 0–0.03)
IMM GRANULOCYTES NFR BLD: 0.3 % (ref 0–0.5)
KETONES UR QL STRIP: NEGATIVE
LEUKOCYTE ESTERASE UR QL STRIP.AUTO: ABNORMAL
LYMPHOCYTES # BLD AUTO: 2.35 10*3/MM3 (ref 1–3)
LYMPHOCYTES NFR BLD AUTO: 15.4 % (ref 21–51)
MCH RBC QN AUTO: 31 PG (ref 27–33)
MCHC RBC AUTO-ENTMCNC: 33.9 G/DL (ref 33–37)
MCV RBC AUTO: 91.4 FL (ref 80–94)
METHADONE UR QL SCN: POSITIVE
MONOCYTES # BLD AUTO: 1.18 10*3/MM3 (ref 0.1–0.9)
MONOCYTES NFR BLD AUTO: 7.7 % (ref 0–10)
NEUTROPHILS # BLD AUTO: 11.64 10*3/MM3 (ref 1.4–6.5)
NEUTROPHILS NFR BLD AUTO: 76.4 % (ref 30–70)
NITRITE UR QL STRIP: NEGATIVE
OPIATES UR QL: NEGATIVE
OSMOLALITY SERPL CALC.SUM OF ELEC: 277.4 MOSM/KG (ref 273–305)
OXYCODONE UR QL SCN: NEGATIVE
PCP UR QL SCN: NEGATIVE
PH UR STRIP.AUTO: 5.5 [PH] (ref 5–8)
PLATELET # BLD AUTO: 351 10*3/MM3 (ref 130–400)
PMV BLD AUTO: 10 FL (ref 6–10)
POTASSIUM BLD-SCNC: 4.1 MMOL/L (ref 3.5–5.3)
PROT SERPL-MCNC: 7.5 G/DL (ref 6–8)
PROT UR QL STRIP: ABNORMAL
RBC # BLD AUTO: 4.65 10*6/MM3 (ref 4.2–5.4)
RBC # UR: ABNORMAL /HPF
REF LAB TEST METHOD: ABNORMAL
SODIUM BLD-SCNC: 140 MMOL/L (ref 135–153)
SP GR UR STRIP: 1.01 (ref 1–1.03)
SQUAMOUS #/AREA URNS HPF: ABNORMAL /HPF
UROBILINOGEN UR QL STRIP: ABNORMAL
WBC NRBC COR # BLD: 15.24 10*3/MM3 (ref 4.5–12.5)
WBC UR QL AUTO: ABNORMAL /HPF

## 2018-11-13 PROCEDURE — 80307 DRUG TEST PRSMV CHEM ANLYZR: CPT | Performed by: PHYSICIAN ASSISTANT

## 2018-11-13 PROCEDURE — 93010 ELECTROCARDIOGRAM REPORT: CPT | Performed by: INTERNAL MEDICINE

## 2018-11-13 PROCEDURE — 85025 COMPLETE CBC W/AUTO DIFF WBC: CPT | Performed by: PHYSICIAN ASSISTANT

## 2018-11-13 PROCEDURE — 81025 URINE PREGNANCY TEST: CPT | Performed by: PHYSICIAN ASSISTANT

## 2018-11-13 PROCEDURE — 93005 ELECTROCARDIOGRAM TRACING: CPT | Performed by: PSYCHIATRY & NEUROLOGY

## 2018-11-13 PROCEDURE — 99223 1ST HOSP IP/OBS HIGH 75: CPT | Performed by: PSYCHIATRY & NEUROLOGY

## 2018-11-13 PROCEDURE — 81001 URINALYSIS AUTO W/SCOPE: CPT | Performed by: PHYSICIAN ASSISTANT

## 2018-11-13 PROCEDURE — HZ2ZZZZ DETOXIFICATION SERVICES FOR SUBSTANCE ABUSE TREATMENT: ICD-10-PCS | Performed by: PSYCHIATRY & NEUROLOGY

## 2018-11-13 PROCEDURE — 99284 EMERGENCY DEPT VISIT MOD MDM: CPT

## 2018-11-13 PROCEDURE — 80053 COMPREHEN METABOLIC PANEL: CPT | Performed by: PHYSICIAN ASSISTANT

## 2018-11-13 RX ORDER — QUETIAPINE FUMARATE 100 MG/1
100 TABLET, FILM COATED ORAL NIGHTLY
Status: CANCELLED | OUTPATIENT
Start: 2018-11-13

## 2018-11-13 RX ORDER — MIRTAZAPINE 15 MG/1
15 TABLET, FILM COATED ORAL NIGHTLY
Status: DISCONTINUED | OUTPATIENT
Start: 2018-11-13 | End: 2018-11-13

## 2018-11-13 RX ORDER — OXYCODONE HCL 10 MG/1
10 TABLET, FILM COATED, EXTENDED RELEASE ORAL DAILY
Status: CANCELLED | OUTPATIENT
Start: 2018-11-13

## 2018-11-13 RX ORDER — IBUPROFEN 600 MG/1
600 TABLET ORAL EVERY 6 HOURS PRN
Status: CANCELLED | OUTPATIENT
Start: 2018-11-13

## 2018-11-13 RX ORDER — BENZTROPINE MESYLATE 1 MG/1
1 TABLET ORAL DAILY PRN
Status: CANCELLED | OUTPATIENT
Start: 2018-11-13

## 2018-11-13 RX ORDER — OLANZAPINE 5 MG/1
5 TABLET, ORALLY DISINTEGRATING ORAL EVERY 8 HOURS PRN
Status: CANCELLED | OUTPATIENT
Start: 2018-11-13

## 2018-11-13 RX ORDER — METOPROLOL TARTRATE 50 MG/1
50 TABLET, FILM COATED ORAL EVERY 12 HOURS SCHEDULED
Status: CANCELLED | OUTPATIENT
Start: 2018-11-13

## 2018-11-13 RX ORDER — POTASSIUM CHLORIDE 750 MG/1
10 TABLET, EXTENDED RELEASE ORAL 2 TIMES DAILY
COMMUNITY
End: 2018-11-19 | Stop reason: HOSPADM

## 2018-11-13 RX ORDER — TRAZODONE HYDROCHLORIDE 50 MG/1
50 TABLET ORAL NIGHTLY PRN
Status: DISCONTINUED | OUTPATIENT
Start: 2018-11-13 | End: 2018-11-19 | Stop reason: HOSPADM

## 2018-11-13 RX ORDER — FAMOTIDINE 20 MG/1
20 TABLET, FILM COATED ORAL 2 TIMES DAILY PRN
Status: CANCELLED | OUTPATIENT
Start: 2018-11-13

## 2018-11-13 RX ORDER — HYDROXYZINE HYDROCHLORIDE 25 MG/1
50 TABLET, FILM COATED ORAL EVERY 6 HOURS PRN
Status: CANCELLED | OUTPATIENT
Start: 2018-11-13

## 2018-11-13 RX ORDER — DULOXETIN HYDROCHLORIDE 30 MG/1
30 CAPSULE, DELAYED RELEASE ORAL DAILY
Status: DISCONTINUED | OUTPATIENT
Start: 2018-11-13 | End: 2018-11-13

## 2018-11-13 RX ORDER — ALUMINA, MAGNESIA, AND SIMETHICONE 2400; 2400; 240 MG/30ML; MG/30ML; MG/30ML
15 SUSPENSION ORAL EVERY 6 HOURS PRN
Status: CANCELLED | OUTPATIENT
Start: 2018-11-13

## 2018-11-13 RX ORDER — LORAZEPAM 2 MG/1
2 TABLET ORAL ONCE
Status: COMPLETED | OUTPATIENT
Start: 2018-11-13 | End: 2018-11-13

## 2018-11-13 RX ORDER — IBUPROFEN 600 MG/1
600 TABLET ORAL EVERY 6 HOURS PRN
Status: DISCONTINUED | OUTPATIENT
Start: 2018-11-13 | End: 2018-11-19 | Stop reason: HOSPADM

## 2018-11-13 RX ORDER — BENZTROPINE MESYLATE 1 MG/ML
0.5 INJECTION INTRAMUSCULAR; INTRAVENOUS DAILY PRN
Status: CANCELLED | OUTPATIENT
Start: 2018-11-13

## 2018-11-13 RX ORDER — FLUPHENAZINE HYDROCHLORIDE 5 MG/1
5 TABLET ORAL DAILY
Status: DISCONTINUED | OUTPATIENT
Start: 2018-11-13 | End: 2018-11-14

## 2018-11-13 RX ORDER — METOPROLOL TARTRATE 50 MG/1
50 TABLET, FILM COATED ORAL EVERY 12 HOURS SCHEDULED
Status: DISCONTINUED | OUTPATIENT
Start: 2018-11-13 | End: 2018-11-19 | Stop reason: HOSPADM

## 2018-11-13 RX ORDER — ONDANSETRON 4 MG/1
4 TABLET, FILM COATED ORAL EVERY 6 HOURS PRN
Status: DISCONTINUED | OUTPATIENT
Start: 2018-11-13 | End: 2018-11-19 | Stop reason: HOSPADM

## 2018-11-13 RX ORDER — POTASSIUM CHLORIDE 750 MG/1
10 TABLET, FILM COATED, EXTENDED RELEASE ORAL DAILY
Status: DISCONTINUED | OUTPATIENT
Start: 2018-11-13 | End: 2018-11-19 | Stop reason: HOSPADM

## 2018-11-13 RX ORDER — BENZONATATE 100 MG/1
100 CAPSULE ORAL 3 TIMES DAILY PRN
Status: DISCONTINUED | OUTPATIENT
Start: 2018-11-13 | End: 2018-11-19 | Stop reason: HOSPADM

## 2018-11-13 RX ORDER — FLUPHENAZINE HYDROCHLORIDE 5 MG/1
5 TABLET ORAL DAILY
Status: CANCELLED | OUTPATIENT
Start: 2018-11-13

## 2018-11-13 RX ORDER — HYDROXYZINE 50 MG/1
50 TABLET, FILM COATED ORAL 3 TIMES DAILY PRN
Status: CANCELLED | OUTPATIENT
Start: 2018-11-13

## 2018-11-13 RX ORDER — MIRTAZAPINE 15 MG/1
15 TABLET, FILM COATED ORAL NIGHTLY
Status: CANCELLED | OUTPATIENT
Start: 2018-11-13 | End: 2019-10-17

## 2018-11-13 RX ORDER — DULOXETIN HYDROCHLORIDE 30 MG/1
30 CAPSULE, DELAYED RELEASE ORAL DAILY
Status: CANCELLED | OUTPATIENT
Start: 2018-11-13

## 2018-11-13 RX ORDER — MIRTAZAPINE 15 MG/1
30 TABLET, FILM COATED ORAL NIGHTLY
Status: DISCONTINUED | OUTPATIENT
Start: 2018-11-13 | End: 2018-11-19 | Stop reason: HOSPADM

## 2018-11-13 RX ORDER — OXYCODONE HYDROCHLORIDE 10 MG/1
10 TABLET ORAL 2 TIMES DAILY
COMMUNITY
End: 2018-11-19 | Stop reason: HOSPADM

## 2018-11-13 RX ORDER — LOPERAMIDE HYDROCHLORIDE 2 MG/1
2 CAPSULE ORAL 4 TIMES DAILY PRN
Status: CANCELLED | OUTPATIENT
Start: 2018-11-13

## 2018-11-13 RX ORDER — FAMOTIDINE 20 MG/1
20 TABLET, FILM COATED ORAL 2 TIMES DAILY PRN
Status: DISCONTINUED | OUTPATIENT
Start: 2018-11-13 | End: 2018-11-19 | Stop reason: HOSPADM

## 2018-11-13 RX ORDER — TRAZODONE HYDROCHLORIDE 50 MG/1
50 TABLET ORAL NIGHTLY PRN
Status: CANCELLED | OUTPATIENT
Start: 2018-11-13

## 2018-11-13 RX ORDER — DULOXETIN HYDROCHLORIDE 30 MG/1
30 CAPSULE, DELAYED RELEASE ORAL DAILY
COMMUNITY
End: 2018-11-19 | Stop reason: HOSPADM

## 2018-11-13 RX ORDER — BENZTROPINE MESYLATE 1 MG/ML
0.5 INJECTION INTRAMUSCULAR; INTRAVENOUS DAILY PRN
Status: DISCONTINUED | OUTPATIENT
Start: 2018-11-13 | End: 2018-11-19 | Stop reason: HOSPADM

## 2018-11-13 RX ORDER — ECHINACEA PURPUREA EXTRACT 125 MG
2 TABLET ORAL AS NEEDED
Status: DISCONTINUED | OUTPATIENT
Start: 2018-11-13 | End: 2018-11-19 | Stop reason: HOSPADM

## 2018-11-13 RX ORDER — LOPERAMIDE HYDROCHLORIDE 2 MG/1
2 CAPSULE ORAL 4 TIMES DAILY PRN
Status: DISCONTINUED | OUTPATIENT
Start: 2018-11-13 | End: 2018-11-19 | Stop reason: HOSPADM

## 2018-11-13 RX ORDER — BENZONATATE 100 MG/1
100 CAPSULE ORAL 3 TIMES DAILY PRN
Status: CANCELLED | OUTPATIENT
Start: 2018-11-13

## 2018-11-13 RX ORDER — ALUMINA, MAGNESIA, AND SIMETHICONE 2400; 2400; 240 MG/30ML; MG/30ML; MG/30ML
15 SUSPENSION ORAL EVERY 6 HOURS PRN
Status: DISCONTINUED | OUTPATIENT
Start: 2018-11-13 | End: 2018-11-19 | Stop reason: HOSPADM

## 2018-11-13 RX ORDER — QUETIAPINE FUMARATE 100 MG/1
100 TABLET, FILM COATED ORAL NIGHTLY
Status: DISCONTINUED | OUTPATIENT
Start: 2018-11-13 | End: 2018-11-19 | Stop reason: HOSPADM

## 2018-11-13 RX ORDER — ECHINACEA PURPUREA EXTRACT 125 MG
2 TABLET ORAL AS NEEDED
Status: CANCELLED | OUTPATIENT
Start: 2018-11-13

## 2018-11-13 RX ORDER — HYDROXYZINE 50 MG/1
50 TABLET, FILM COATED ORAL EVERY 6 HOURS PRN
Status: DISCONTINUED | OUTPATIENT
Start: 2018-11-13 | End: 2018-11-19 | Stop reason: HOSPADM

## 2018-11-13 RX ORDER — OXYCODONE HYDROCHLORIDE 5 MG/1
10 TABLET ORAL 2 TIMES DAILY
Status: CANCELLED | OUTPATIENT
Start: 2018-11-13

## 2018-11-13 RX ORDER — NICOTINE 21 MG/24HR
1 PATCH, TRANSDERMAL 24 HOURS TRANSDERMAL EVERY 24 HOURS
Status: CANCELLED | OUTPATIENT
Start: 2018-11-13

## 2018-11-13 RX ORDER — NICOTINE 21 MG/24HR
1 PATCH, TRANSDERMAL 24 HOURS TRANSDERMAL
Status: DISCONTINUED | OUTPATIENT
Start: 2018-11-13 | End: 2018-11-19 | Stop reason: HOSPADM

## 2018-11-13 RX ORDER — OLANZAPINE 5 MG/1
5 TABLET, ORALLY DISINTEGRATING ORAL EVERY 8 HOURS PRN
Status: DISCONTINUED | OUTPATIENT
Start: 2018-11-13 | End: 2018-11-19 | Stop reason: HOSPADM

## 2018-11-13 RX ORDER — METOPROLOL TARTRATE 50 MG/1
50 TABLET, FILM COATED ORAL 2 TIMES DAILY
COMMUNITY
End: 2018-11-19 | Stop reason: HOSPADM

## 2018-11-13 RX ORDER — BENZTROPINE MESYLATE 1 MG/1
1 TABLET ORAL DAILY PRN
Status: DISCONTINUED | OUTPATIENT
Start: 2018-11-13 | End: 2018-11-19 | Stop reason: HOSPADM

## 2018-11-13 RX ORDER — ONDANSETRON 4 MG/1
4 TABLET, FILM COATED ORAL EVERY 6 HOURS PRN
Status: CANCELLED | OUTPATIENT
Start: 2018-11-13

## 2018-11-13 RX ORDER — POTASSIUM CHLORIDE 750 MG/1
10 TABLET, FILM COATED, EXTENDED RELEASE ORAL 2 TIMES DAILY WITH MEALS
Status: CANCELLED | OUTPATIENT
Start: 2018-11-13

## 2018-11-13 RX ORDER — OXYMORPHONE HYDROCHLORIDE 5 MG/1
5 TABLET, FILM COATED, EXTENDED RELEASE ORAL DAILY
COMMUNITY
End: 2018-11-19 | Stop reason: HOSPADM

## 2018-11-13 RX ADMIN — IBUPROFEN 600 MG: 600 TABLET ORAL at 20:21

## 2018-11-13 RX ADMIN — NICOTINE 1 PATCH: 21 PATCH TRANSDERMAL at 11:09

## 2018-11-13 RX ADMIN — METOPROLOL TARTRATE 50 MG: 50 TABLET, FILM COATED ORAL at 12:02

## 2018-11-13 RX ADMIN — LORAZEPAM 2 MG: 2 TABLET ORAL at 08:24

## 2018-11-13 RX ADMIN — MIRTAZAPINE 30 MG: 15 TABLET, FILM COATED ORAL at 20:21

## 2018-11-13 RX ADMIN — POTASSIUM CHLORIDE 10 MEQ: 750 TABLET, FILM COATED, EXTENDED RELEASE ORAL at 12:02

## 2018-11-13 RX ADMIN — IBUPROFEN 600 MG: 600 TABLET ORAL at 10:22

## 2018-11-13 RX ADMIN — FLUPHENAZINE HYDROCHLORIDE 5 MG: 5 TABLET, FILM COATED ORAL at 12:02

## 2018-11-13 NOTE — CONSULTS
"Intake assessment completed. Patient brought in by mother this am. She reports that she has a hx of psychosis and that she has been having severe panic attacks for years but for the past week she has been acting  Very peculiar. Not sleeping,or eating,  paranoid that people are in her house. Hearing voices and just afraid that people are trying to get in to her. She reports that she called her talking out of her head and she knew she had to bring her in when she gets like this. Admitted in June for same type of behavior. She reports that she is not taking her meds, not keeping her appts at the [facility] and is not sure if she is on drugs or what.she reports that she is afraid for her daughter and that she wants her to get help.  Patient appears bizarre and restless and appears paranoid during assessment and unable to remain focused long.  Poor historian and unwilling to answer many questions when asked. When asked about substance abuse or depression she would not answer nothing but I don't know I don't know I am just freaking out man. Anxiety rated 10/10. Will not rate depression. Patient denies taking any other drugs but her pain pills for her back and says that she needs them. Unable to recall last dose. She reports \" this shit aint real Im  sure what is real\". Emotional support provided to pt.   "

## 2018-11-13 NOTE — PLAN OF CARE
"Problem: Patient Care Overview  Goal: Individualization and Mutuality  Outcome: Ongoing (interventions implemented as appropriate)   11/13/18 1111 11/13/18 1116   Individualization   Patient Specific Preferences --  None specified    Patient Specific Goals (Include Timeframe) --  Patient will receive inpatient stabilization over 2-7 days approximately    Patient Specific Interventions --  Therapist will offer 1-4 individual sessions, family education, aftercare planning.    Mutuality/Individual Preferences   What Anxieties, Fears, Concerns, or Questions Do You Have About Your Care? --  \"Afraid of people.\"   What Information Would Help Us Give You More Personalized Care? --  None identified    How Would You and/or Your Support Person Like to Participate in Your Care? --  NA; refusing    Mutuality/Individual Preferences   How to Address Anxieties/Fears --  Talk to staff    Personal Strengths/Vulnerabilities   Patient Personal Strengths family/social support;positive educational history;motivated for treatment;motivated for recovery;stable living environment;spiritual/[employer] support --    Patient Vulnerabilities Ineffective coping, poor historian  --          Goal: Discharge Needs Assessment  Outcome: Ongoing (interventions implemented as appropriate)        "

## 2018-11-13 NOTE — PROGRESS NOTES
"1045:       DATA:       Therapist met individually with patient this date to introduce role and to discuss hospitalization expectations. Patient agreeable.     Therapist provided emotional support and education this date.   Discussed the therapist/patient relationship and explain the parameters and limitations of relative confidentiality.  Also discussed the importance active participation, and honesty to the treatment process.  Encouraged patient to utilize individual sessions to discuss/vent their feelings, frustrations, and fears.      Therapist completed integrated summary, treatment plan, and initiated social history this date.  Therapist is strongly recommending a family session prior to discharge.  Patient refusing this date.     ASSESSMENT:     Ms. Cintia Issa is a 33 year old , single, high school educated female. Patient is disabled and history of multiple admissions; last being in June 2018. Patient last diagnosed with Paranoid Schizophrenia.  Patient required admission due to bizarre behavior change.  Today, patient appears calm and cooperative. However, she appears paranoid and evasive.  Patient reports that she is scared at home and can't go back there.  Patient only provide circumstantial responses to assessment questions.  Patient's UDS currently positive for Methadone and Morphine. Patient reports that she is prescribed pain pills for her back. Patient apparently not taking her prescribed psychotropic medications and missing [facility] appointments.  When asked if she will be going home at discharge patient reports, \"I don't know. I'm not trying to go back there if you know what I mean.\" Patient has signed consent for [facility] referral.       PLAN:      Patient to remain hospitalized.    Treatment team will focus efforts on stabilizing patient's acute symptoms while providing education on healthy coping and crisis management to reduce hospitalizations.   Patient requires daily " psychiatrist evaluation and 24/7 nursing supervision to promote patient  safety.    Therapist will offer 1-4 individual sessions (20-30 minutes each), 1 therapy group daily, family education, and appropriate referral.    Therapist recommends outpatient psychiatric referral.  Patient refusing and requesting [facility] referral.

## 2018-11-13 NOTE — ED PROVIDER NOTES
Subjective     History provided by:  Patient   used: No    Mental Health Problem   Presenting symptoms comment:  Patient is a 34 y/o female who presents to the ED with complaints of increased anxiety. Patient's mother is with her and states that she has voiced to her that she is seeing shadows in her house.  Mother concerned that she might be noncompliant with her medications. Patient states she has had poor sleep. States that she has been up all night. Patient denies SI/HI,AVH.  Patient denies any drug or alcohol abuse.  Patient is currently taking Prolixin and vistaril.   Patient stated that the last time she felt like this she had to be admitted to the psych unit.    Context: noncompliance    Relieved by:  Nothing  Worsened by:  Nothing  Ineffective treatments:  None tried  Associated symptoms: anxiety        Review of Systems   Constitutional: Negative.    HENT: Negative.    Eyes: Negative.    Respiratory: Negative.    Cardiovascular: Negative.    Gastrointestinal: Negative.    Endocrine: Negative.    Genitourinary: Negative.    Musculoskeletal: Negative.    Skin: Negative.    Allergic/Immunologic: Negative.    Neurological: Negative.    Hematological: Negative.    Psychiatric/Behavioral: The patient is nervous/anxious.    All other systems reviewed and are negative.      Past Medical History:   Diagnosis Date   • Anxiety    • Arthritis    • Bronchitis    • Essential hypertension 9/15/2016   • Hypertension    • MDD (major depressive disorder), recurrent, severe, with psychosis (CMS/HCC) 6/9/2018   • Panic disorder    • Psychiatric illness    • Schizoaffective disorder (CMS/HCC)    • Schizophrenia, paranoid (CMS/HCC)        Allergies   Allergen Reactions   • Elavil [Amitriptyline] Nausea Only       Past Surgical History:   Procedure Laterality Date   • WISDOM TOOTH EXTRACTION         Family History   Problem Relation Age of Onset   • Arthritis Mother    • Stroke Father    • COPD Brother    •  "Diabetes Maternal Grandmother    • COPD Maternal Grandfather    • Heart disease Maternal Grandfather    • Stroke Maternal Grandfather    • ADD / ADHD Neg Hx    • Anxiety disorder Neg Hx    • Bipolar disorder Neg Hx    • Alcohol abuse Neg Hx        Social History     Socioeconomic History   • Marital status: Single     Spouse name: Not on file   • Number of children: Not on file   • Years of education: Not on file   • Highest education level: Not on file   Tobacco Use   • Smoking status: Current Every Day Smoker     Packs/day: 1.00     Types: Cigarettes   • Smokeless tobacco: Never Used   Substance and Sexual Activity   • Alcohol use: No   • Drug use: Yes     Types: Oxycodone     Comment: history unknown. poor historian due to mental status.    • Sexual activity: No     Comment: reports she hasn't been in \"forever.\"            Objective   Physical Exam   Constitutional: She is oriented to person, place, and time. She appears well-developed and well-nourished.   HENT:   Head: Normocephalic and atraumatic.   Right Ear: External ear normal.   Left Ear: External ear normal.   Nose: Nose normal.   Mouth/Throat: Oropharynx is clear and moist.   Eyes: Conjunctivae and EOM are normal. Pupils are equal, round, and reactive to light.   Neck: Normal range of motion. Neck supple.   Cardiovascular: Normal rate, regular rhythm, normal heart sounds and intact distal pulses.   Pulmonary/Chest: Effort normal and breath sounds normal.   Abdominal: Soft. Bowel sounds are normal.   Musculoskeletal: Normal range of motion.   Neurological: She is alert and oriented to person, place, and time.   Skin: Skin is warm and dry.   Psychiatric: Her speech is normal and behavior is normal. Judgment and thought content normal. Her mood appears anxious. She is not actively hallucinating. Cognition and memory are normal. She is attentive.   Nursing note and vitals reviewed.      Procedures           ED Course  ED Course as of Nov 13 2059   Tue Nov " 13, 2018   0828 Patient medically cleared for intake.   [BH]      ED Course User Index  [BH] Lawrence Gonzalez PA-C                  [facility]      Final diagnoses:   Anxiety            Elizabeth Renteria PA  11/13/18 2059
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
## 2018-11-13 NOTE — ED PROVIDER NOTES
"Subjective   History of Present Illness    Review of Systems    Past Medical History:   Diagnosis Date   • Anxiety    • Arthritis    • Bronchitis    • Hypertension    • MDD (major depressive disorder), recurrent, severe, with psychosis (CMS/HCC) 6/9/2018   • Panic disorder    • Psychiatric illness    • Schizoaffective disorder (CMS/HCC)    • Schizophrenia, paranoid (CMS/HCC)        Allergies   Allergen Reactions   • Elavil [Amitriptyline] Nausea Only       Past Surgical History:   Procedure Laterality Date   • WISDOM TOOTH EXTRACTION         Family History   Problem Relation Age of Onset   • Arthritis Mother    • Stroke Father    • COPD Brother    • Diabetes Maternal Grandmother    • COPD Maternal Grandfather    • Heart disease Maternal Grandfather    • Stroke Maternal Grandfather    • ADD / ADHD Neg Hx    • Anxiety disorder Neg Hx    • Bipolar disorder Neg Hx    • Alcohol abuse Neg Hx        Social History     Socioeconomic History   • Marital status: Single     Spouse name: Not on file   • Number of children: Not on file   • Years of education: Not on file   • Highest education level: Not on file   Tobacco Use   • Smoking status: Current Every Day Smoker     Packs/day: 1.00     Types: Cigarettes   • Smokeless tobacco: Never Used   Substance and Sexual Activity   • Alcohol use: No   • Drug use: Yes     Types: Oxycodone     Comment: history unknown. poor historian due to mental status.    • Sexual activity: No     Comment: reports she hasn't been in \"forever.\"            Objective   Physical Exam    Procedures           ED Course  ED Course as of Nov 13 1111   Tue Nov 13, 2018   0828 Patient medically cleared for intake.   []      ED Course User Index  [] Lawrence Gonzalez PA-C                  [facility]      Final diagnoses:   Anxiety            Lawrence Gonzalez PA-C  11/13/18 1111    "

## 2018-11-14 PROCEDURE — 25010000002 FLUPHENAZINE DECANOATE PER 25 MG: Performed by: PSYCHIATRY & NEUROLOGY

## 2018-11-14 PROCEDURE — 99232 SBSQ HOSP IP/OBS MODERATE 35: CPT | Performed by: PSYCHIATRY & NEUROLOGY

## 2018-11-14 RX ORDER — FLUPHENAZINE DECANOATE 25 MG/ML
25 INJECTION, SOLUTION INTRAMUSCULAR; SUBCUTANEOUS ONCE
Status: COMPLETED | OUTPATIENT
Start: 2018-11-14 | End: 2018-11-14

## 2018-11-14 RX ORDER — ARIPIPRAZOLE 10 MG/1
5 TABLET ORAL DAILY
Status: DISCONTINUED | OUTPATIENT
Start: 2018-11-14 | End: 2018-11-19 | Stop reason: HOSPADM

## 2018-11-14 RX ADMIN — NICOTINE 1 PATCH: 21 PATCH TRANSDERMAL at 08:17

## 2018-11-14 RX ADMIN — IBUPROFEN 600 MG: 600 TABLET ORAL at 15:58

## 2018-11-14 RX ADMIN — IBUPROFEN 600 MG: 600 TABLET ORAL at 08:19

## 2018-11-14 RX ADMIN — POTASSIUM CHLORIDE 10 MEQ: 750 TABLET, FILM COATED, EXTENDED RELEASE ORAL at 08:17

## 2018-11-14 RX ADMIN — MIRTAZAPINE 30 MG: 15 TABLET, FILM COATED ORAL at 20:39

## 2018-11-14 RX ADMIN — FLUPHENAZINE HYDROCHLORIDE 5 MG: 5 TABLET, FILM COATED ORAL at 08:16

## 2018-11-14 RX ADMIN — FLUPHENAZINE DECANOATE 25 MG: 25 INJECTION, SOLUTION INTRAMUSCULAR; SUBCUTANEOUS at 11:28

## 2018-11-14 RX ADMIN — QUETIAPINE FUMARATE 100 MG: 100 TABLET ORAL at 20:39

## 2018-11-14 RX ADMIN — METOPROLOL TARTRATE 50 MG: 50 TABLET, FILM COATED ORAL at 08:16

## 2018-11-14 RX ADMIN — ARIPIPRAZOLE 5 MG: 10 TABLET ORAL at 11:28

## 2018-11-14 RX ADMIN — METOPROLOL TARTRATE 50 MG: 50 TABLET, FILM COATED ORAL at 20:39

## 2018-11-14 NOTE — PLAN OF CARE
Problem: Patient Care Overview  Goal: Plan of Care Review  Outcome: Ongoing (interventions implemented as appropriate)   11/14/18 1538   Coping/Psychosocial   Plan of Care Reviewed With patient   Coping/Psychosocial   Patient Agreement with Plan of Care agrees   Plan of Care Review   Progress no change   OTHER   Outcome Summary Up and about. Denies si/hi Reports she continues to hear voices. Calm and cooperative.       Problem: Overarching Goals (Adult)  Goal: Adheres to Safety Considerations for Self and Others  Outcome: Ongoing (interventions implemented as appropriate)    Goal: Optimized Coping Skills in Response to Life Stressors  Outcome: Ongoing (interventions implemented as appropriate)    Goal: Develops/Participates in Therapeutic [place] to Support Successful Transition  Outcome: Ongoing (interventions implemented as appropriate)

## 2018-11-14 NOTE — PLAN OF CARE
"Problem: Patient Care Overview  Goal: Interprofessional Rounds/Family Conf  Outcome: Ongoing (interventions implemented as appropriate)   11/14/18 0944   Interdisciplinary Rounds/Family Conf   Summary Treatment team staffing    Interdisciplinary Rounds/Family Conf   Participants social work;nursing;psychiatrist;patient       0930:     Therapist staffed case with Dr. Avalos this date.  Discussed possibility of starting patient on injectable medication and recommendation to involve her mother in treatment. No other major issues identified. Aftercare scheduled with [facility]. Will also discuss possibility of [facility] IOP but patient lives in [facility] and has [place] medicaid.       DATA:      Therapist met individually with patient this date. Reintroduced self to patient from initial assessment began yesterday.  Discussed progress in treatment and any needs/concerns.     Patient more receptive to involving her mother Rolanda at [phone]; patient's mother supportive.  Reports that patient had become paranoid.  Turning her heat off.  Turning fuse box off at home.  Packed her bags and asked her mother to bring her home. Reporting seeing people following her, hearing people on her roof.  Patient's mother reports going to her home and the medicine bottle was full indicating that patient is not taking regularly. Had panic attack earlier int he week and had to be taken to [facility].   Patient's mother reports that patient will likely want to go home once stable. Home reported to be safe guarded.        ASSESSMENT:     Patient observed to have perplexed/restricted affect and mood.  Patient remains somewhat guarded.  Reports that she does not think she can return home because she is scared there.  Possibly due to lingering paranoia.  Patient reports that she hears voices calling her a \"bitch.\" Says that she drank her boyfriend' soda which had Opiates it it.  Denies chronic drug issues.     Plan:    Patient tor remain " hospitalized this date.  Aftercare scheduled with [facility].  Anticipate for patient to return home once paranoia subsides. Patient reports that she lives too far away to do [facility] IOP.

## 2018-11-14 NOTE — PROGRESS NOTES
"INPATIENT PSYCHIATRIC PROGRESS NOTE    Name:  Cintia Issa  :  1985  MRN:  [id number]  Visit Number:  [id number]  Length of stay:  1    Behavioral Health Treatment Plan and Problem List: I have reviewed and approved the Behavioral Health Treatment Plan and Problem list.    SUBJECTIVE    CC/ Focus of exam: psychosis/ paranoia/ substance abuse.     Patient's subjective status: \"still hearing those voices\"     INTERVAL HISTORY: Remains psychotic with paranoia, auditory hallucinations, although affect appropriate. \"Try not to think about what ever is going on\" referring to stresses in part her psychotic thought content.   \"Scarred to go back home\".   Explains the UDS results that she drank her  BF's soda that had the Opiates in it unknown to her.      Agrees with IM depo Prolixin decanoate (actually asked for this to be restarted)     Depression rating 2/10  Anxiety rating 2/10  Sleep: >8 hours  Withdrawal sx: none - denying abuse of Rx'ed or illicite drugs.   Cravin/10       Review of Systems   HENT: Positive for congestion.    Respiratory: Negative.    Cardiovascular: Negative.    Gastrointestinal: Negative.    Musculoskeletal: Positive for back pain.   Neurological: Negative.    Asking for her \"home medicines\" that include low dose opiates for her back pain complaint.       OBJECTIVE    Temp:  [96.1 °F (35.6 °C)-98.8 °F (37.1 °C)] 98.8 °F (37.1 °C)  Heart Rate:  [73-96] 73  Resp:  [16-18] 16  BP: ()/(56-72) 105/70    MENTAL STATUS EXAM:      Appearance:Casually dressed, good hygeine.   Cooperation:Cooperative  Psychomotor: No psychomotor agitation/retardation, No EPS, No motor tics  Speech-normal rate, amount.   Mood/Affect:  Blunted and Flat  Thought Processes:  coherent  Thought Content:  dilussional and paranoid  Hallucination(s): auditory  Hopelessness: No  Optimistic:minimally  Suicidal Thoughts:   No  Suicidal Plan/Intent:  No  Homicidal Thoughts:  absent  Orientation: oriented x " 3  Memory: recent remote intact    Lab Results (last 24 hours)     ** No results found for the last 24 hours. **           Imaging Results (last 24 hours)     ** No results found for the last 24 hours. **           ECG/EMG Results (most recent)     Procedure Component Value Units Date/Time    ECG 12 Lead [[id number]] Collected:  11/13/18 1619     Updated:  11/13/18 2009    Narrative:       Test Reason : Potential adverse reaction to medications.  Blood Pressure : **/** mmHG  Vent. Rate : 074 BPM     Atrial Rate : 074 BPM     P-R Int : 134 ms          QRS Dur : 084 ms      QT Int : 410 ms       P-R-T Axes : 062 070 072 degrees     QTc Int : 455 ms    Normal sinus rhythm  Normal ECG  When compared with ECG of 13-NOV-2018 16:19, (Unconfirmed)  No significant change was found  Confirmed by Akshat Perea ([id number]) on 11/13/2018 8:09:19 PM    Referred By:  MADAN           Confirmed By:Akshat Perea           ALLERGIES: Elavil [amitriptyline]      Current Facility-Administered Medications:   •  aluminum-magnesium hydroxide-simethicone (MAALOX MAX) 400-400-40 MG/5ML suspension 15 mL, 15 mL, Oral, Q6H PRN, Pattie Mondragon MD  •  benzonatate (TESSALON) capsule 100 mg, 100 mg, Oral, TID PRN, Pattie Mondragon MD  •  benztropine (COGENTIN) tablet 1 mg, 1 mg, Oral, Daily PRN **OR** benztropine (COGENTIN) injection 0.5 mg, 0.5 mg, Intramuscular, Daily PRN, Pattie Mondragon MD  •  famotidine (PEPCID) tablet 20 mg, 20 mg, Oral, BID PRN, Pattie Mondragon MD  •  fluPHENAZine (PROLIXIN) tablet 5 mg, 5 mg, Oral, Daily, Pattie Mondragon MD, 5 mg at 11/14/18 0816  •  hydrOXYzine (ATARAX) tablet 50 mg, 50 mg, Oral, Q6H PRN, Pattie Mondragon MD  •  ibuprofen (ADVIL,MOTRIN) tablet 600 mg, 600 mg, Oral, Q6H PRN, Pattie Mondragon MD, 600 mg at 11/14/18 0819  •  loperamide (IMODIUM) capsule 2 mg, 2 mg, Oral, 4x Daily PRN, Pattie Mondragon MD  •  magnesium hydroxide (MILK OF MAGNESIA) suspension 2400 mg/10mL 10 mL, 10 mL, Oral, Daily PRN, Pattie Mondragon,  MD  •  metoprolol tartrate (LOPRESSOR) tablet 50 mg, 50 mg, Oral, Q12H, Pattie Mondragon MD, 50 mg at 11/14/18 0816  •  mirtazapine (REMERON) tablet 30 mg, 30 mg, Oral, Nightly, Gumaro Avalos MD, 30 mg at 11/13/18 2021  •  nicotine (NICODERM CQ) 21 MG/24HR patch 1 patch, 1 patch, Transdermal, Q24H, Pattie Mondragon MD, 1 patch at 11/14/18 0817  •  OLANZapine zydis (zyPREXA) disintegrating tablet 5 mg, 5 mg, Oral, Q8H PRN, Pattie Mondragon MD  •  ondansetron (ZOFRAN) tablet 4 mg, 4 mg, Oral, Q6H PRN, Pattie Mondragon MD  •  potassium chloride (K-DUR,KLOR-CON) ER tablet 10 mEq, 10 mEq, Oral, Daily, Pattie Mondragon MD, 10 mEq at 11/14/18 0817  •  QUEtiapine (SEROquel) tablet 100 mg, 100 mg, Oral, Nightly, Pattie Mondragon MD  •  sodium chloride (OCEAN) nasal spray 2 spray, 2 spray, Each Nare, PRN, Pattie Mondragon MD  •  traZODone (DESYREL) tablet 50 mg, 50 mg, Oral, Nightly PRN, Pattie Mondragon MD    ASSESSMENT & PLAN    Patient Active Problem List   Diagnosis   • Fibromyalgia   • Essential hypertension   • Schizophrenia, paranoid (CMS/HCC)   • Major depressive disorder, recurrent (CMS/HCC)         Suicide precautions: Suicide precaution Level 3 (q15 min checks)     Behavioral Health Treatment Plan and Problem List: I have reviewed and approved the Behavioral Health Treatment Plan and Problem list.     I spent a total of 25 minutes coordinating the patient's care today;  15 minutes were spent face to face with the patient counseling on psychosis/ paranoia/substance abuse, the current treatment and follow up plan.     Clinician:  Gumaro Avalos MD  11/14/18  9:57 AM    Dictated utilizing Dragon dictation

## 2018-11-14 NOTE — PLAN OF CARE
Problem: Patient Care Overview  Goal: Plan of Care Review  Outcome: Ongoing (interventions implemented as appropriate)   11/14/18 0401   Coping/Psychosocial   Plan of Care Reviewed With patient   Coping/Psychosocial   Patient Agreement with Plan of Care agrees   Plan of Care Review   Progress no change   OTHER   Outcome Summary Pt isolates to room. Rates anxiety 9/10 depression 2/10, reports hearing voices talking bad to her. Denies SI HI.       Problem: Overarching Goals (Adult)  Goal: Adheres to Safety Considerations for Self and Others  Outcome: Ongoing (interventions implemented as appropriate)    Goal: Optimized Coping Skills in Response to Life Stressors  Outcome: Ongoing (interventions implemented as appropriate)    Goal: Develops/Participates in Therapeutic [place] to Support Successful Transition  Outcome: Ongoing (interventions implemented as appropriate)

## 2018-11-15 PROCEDURE — 99232 SBSQ HOSP IP/OBS MODERATE 35: CPT | Performed by: PSYCHIATRY & NEUROLOGY

## 2018-11-15 RX ORDER — CETIRIZINE HYDROCHLORIDE, PSEUDOEPHEDRINE HYDROCHLORIDE 5; 120 MG/1; MG/1
1 TABLET, FILM COATED, EXTENDED RELEASE ORAL 2 TIMES DAILY PRN
Status: DISCONTINUED | OUTPATIENT
Start: 2018-11-15 | End: 2018-11-19 | Stop reason: HOSPADM

## 2018-11-15 RX ADMIN — QUETIAPINE FUMARATE 100 MG: 100 TABLET ORAL at 20:41

## 2018-11-15 RX ADMIN — METOPROLOL TARTRATE 50 MG: 50 TABLET, FILM COATED ORAL at 20:41

## 2018-11-15 RX ADMIN — IBUPROFEN 600 MG: 600 TABLET ORAL at 15:43

## 2018-11-15 RX ADMIN — ARIPIPRAZOLE 5 MG: 10 TABLET ORAL at 09:37

## 2018-11-15 RX ADMIN — HYDROXYZINE HYDROCHLORIDE 50 MG: 50 TABLET, FILM COATED ORAL at 21:37

## 2018-11-15 RX ADMIN — POTASSIUM CHLORIDE 10 MEQ: 750 TABLET, FILM COATED, EXTENDED RELEASE ORAL at 09:37

## 2018-11-15 RX ADMIN — NICOTINE 1 PATCH: 21 PATCH TRANSDERMAL at 09:37

## 2018-11-15 RX ADMIN — CETIRIZINE HYDROCHLORIDE AND PSEUDOEPHEDRINE HYDROCHLORIDE 1 TABLET: 5; 120 TABLET, FILM COATED, EXTENDED RELEASE ORAL at 10:21

## 2018-11-15 RX ADMIN — METOPROLOL TARTRATE 50 MG: 50 TABLET, FILM COATED ORAL at 09:37

## 2018-11-15 RX ADMIN — MIRTAZAPINE 30 MG: 15 TABLET, FILM COATED ORAL at 20:41

## 2018-11-15 RX ADMIN — TRAZODONE HYDROCHLORIDE 50 MG: 50 TABLET ORAL at 21:29

## 2018-11-15 RX ADMIN — IBUPROFEN 600 MG: 600 TABLET ORAL at 09:39

## 2018-11-15 RX ADMIN — HYDROXYZINE HYDROCHLORIDE 50 MG: 50 TABLET, FILM COATED ORAL at 09:39

## 2018-11-15 RX ADMIN — HYDROXYZINE HYDROCHLORIDE 50 MG: 50 TABLET, FILM COATED ORAL at 15:43

## 2018-11-15 RX ADMIN — IBUPROFEN 600 MG: 600 TABLET ORAL at 21:28

## 2018-11-15 NOTE — PLAN OF CARE
Problem: Patient Care Overview  Goal: Plan of Care Review  Outcome: Ongoing (interventions implemented as appropriate)  PT. VERBALIZES  SLEPT 8 HOURS RATES ANX.3 DENIES S/I DENIES H/I PT. VERBALIZES  FEELS A LITTLE AFRAID COOPERATIVE SOME INTERACTION NOTED WITH PEERS   11/15/18 6490   Coping/Psychosocial   Plan of Care Reviewed With patient   Coping/Psychosocial   Patient Agreement with Plan of Care agrees   Plan of Care Review   Progress improving       Problem: Overarching Goals (Adult)  Goal: Adheres to Safety Considerations for Self and Others  Outcome: Ongoing (interventions implemented as appropriate)    Goal: Optimized Coping Skills in Response to Life Stressors  Outcome: Ongoing (interventions implemented as appropriate)    Goal: Develops/Participates in Therapeutic [place] to Support Successful Transition  Outcome: Ongoing (interventions implemented as appropriate)

## 2018-11-15 NOTE — PROGRESS NOTES
"INPATIENT PSYCHIATRIC PROGRESS NOTE    Name:  Cintia Issa  :  1985  MRN:  [id number]  Visit Number:  [id number]  Length of stay:  2    Behavioral Health Treatment Plan and Problem List: I have reviewed and approved the Behavioral Health Treatment Plan and Problem list.    SUBJECTIVE    CC/ Focus of exam: Paranoia, psychosis    Patient's subjective status: \"Scarred\"     INTERVAL HISTORY: Remains intimidated by her paranoia, particularly about her home isolations and suspicions. Wanting to fine an alternative to returning there.   Minimal depressive component to her symptoms.     Presents a disposition challenge with her resistance to returning to her residence or to her mother's home - no other apparent options? Difficult to know just how much her paranoid is a determinate in this.     Depression rating 0/10  Anxiety rating \"Stay worried\"  Sleep: slept thru the night.     Cravin/10 \"use to taking medication (Oxycodone) for my back\".        Review of Systems   Respiratory: Negative.    Cardiovascular: Negative.    Gastrointestinal: Negative.    Musculoskeletal: Positive for back pain.         OBJECTIVE    Temp:  [96.6 °F (35.9 °C)-98.4 °F (36.9 °C)] 96.6 °F (35.9 °C)  Heart Rate:  [68-87] 87  Resp:  [18] 18  BP: (109-115)/(68-72) 115/72    MENTAL STATUS EXAM:      Appearance:Casually dressed, good hygeine.   Cooperation:Cooperative  Psychomotor: No psychomotor agitation/retardation, No EPS, No motor tics  Speech-normal rate, amount.   Mood/Affect:  Blunted and Flat  Thought Processes:  linear  Thought Content:  paranoid  Hallucination(s): \"More like a loud thought, a pattern, like something I wouldn't normally think of, like calling me a Bitch\".   Hopelessness: Yes \"but so scarred about going home\"  Optimistic:minimally  Suicidal Thoughts:   No  Suicidal Plan/Intent:  No  Homicidal Thoughts:  absent  Orientation: oriented x 3  Memory: recent intact    Lab Results (last 24 hours)     ** No results " found for the last 24 hours. **           Imaging Results (last 24 hours)     ** No results found for the last 24 hours. **           ECG/EMG Results (most recent)     Procedure Component Value Units Date/Time    ECG 12 Lead [[id number]] Collected:  11/13/18 1619     Updated:  11/13/18 2009    Narrative:       Test Reason : Potential adverse reaction to medications.  Blood Pressure : **/** mmHG  Vent. Rate : 074 BPM     Atrial Rate : 074 BPM     P-R Int : 134 ms          QRS Dur : 084 ms      QT Int : 410 ms       P-R-T Axes : 062 070 072 degrees     QTc Int : 455 ms    Normal sinus rhythm  Normal ECG  When compared with ECG of 13-NOV-2018 16:19, (Unconfirmed)  No significant change was found  Confirmed by Akshat Perea ([id number]) on 11/13/2018 8:09:19 PM    Referred By:  MADAN           Confirmed By:Akshat Perea           ALLERGIES: Elavil [amitriptyline]      Current Facility-Administered Medications:   •  aluminum-magnesium hydroxide-simethicone (MAALOX MAX) 400-400-40 MG/5ML suspension 15 mL, 15 mL, Oral, Q6H PRN, Pattie Mondragon MD  •  ARIPiprazole (ABILIFY) tablet 5 mg, 5 mg, Oral, Daily, Gumaro Avalos MD, 5 mg at 11/14/18 1128  •  benzonatate (TESSALON) capsule 100 mg, 100 mg, Oral, TID PRN, Pattie Mondragon MD  •  benztropine (COGENTIN) tablet 1 mg, 1 mg, Oral, Daily PRN **OR** benztropine (COGENTIN) injection 0.5 mg, 0.5 mg, Intramuscular, Daily PRN, Pattie Mondragon MD  •  famotidine (PEPCID) tablet 20 mg, 20 mg, Oral, BID PRN, Pattie Mondragon MD  •  hydrOXYzine (ATARAX) tablet 50 mg, 50 mg, Oral, Q6H PRN, Pattie Mondragon MD  •  ibuprofen (ADVIL,MOTRIN) tablet 600 mg, 600 mg, Oral, Q6H PRN, Pattie Mondragon MD, 600 mg at 11/14/18 1818  •  loperamide (IMODIUM) capsule 2 mg, 2 mg, Oral, 4x Daily PRN, Pattie Mondragon MD  •  magnesium hydroxide (MILK OF MAGNESIA) suspension 2400 mg/10mL 10 mL, 10 mL, Oral, Daily PRN, Pattie Mondragon MD  •  metoprolol tartrate (LOPRESSOR) tablet 50 mg, 50 mg, Oral, Q12H,  Pattie Mondragon MD, 50 mg at 11/14/18 2039  •  mirtazapine (REMERON) tablet 30 mg, 30 mg, Oral, Nightly, Gumaro Avalos MD, 30 mg at 11/14/18 2039  •  nicotine (NICODERM CQ) 21 MG/24HR patch 1 patch, 1 patch, Transdermal, Q24H, Pattie Mondragon MD, 1 patch at 11/14/18 0817  •  OLANZapine zydis (zyPREXA) disintegrating tablet 5 mg, 5 mg, Oral, Q8H PRN, Pattie Mondragon MD  •  ondansetron (ZOFRAN) tablet 4 mg, 4 mg, Oral, Q6H PRN, Pattie Mondragon MD  •  potassium chloride (K-DUR,KLOR-CON) ER tablet 10 mEq, 10 mEq, Oral, Daily, Pattie Mondragon MD, 10 mEq at 11/14/18 0817  •  QUEtiapine (SEROquel) tablet 100 mg, 100 mg, Oral, Nightly, Pattie Mondragon MD, 100 mg at 11/14/18 2039  •  sodium chloride (OCEAN) nasal spray 2 spray, 2 spray, Each Nare, PRN, Pattie Mondragon MD  •  traZODone (DESYREL) tablet 50 mg, 50 mg, Oral, Nightly PRN, Pattie Mondragon MD    ASSESSMENT & PLAN        Schizophrenia, paranoid (CMS/HCC)  -Seroquel, Prolixin, Remeron plus individual and group psychotherapeutic efforts Prolixin decanoate 25 mg IM yesterday.   Essential hypertension  -Lopressor      Suicide precautions: Suicide precaution Level 3 (q15 min checks)     Behavioral Health Treatment Plan and Problem List: I have reviewed and approved the Behavioral Health Treatment Plan and Problem list.     I spent a total of 25 minutes coordinating the patient's care today;  15 minutes were spent face to face with the patient counseling on paranoia/ psychosis, the current treatment and follow up plan.     Clinician:  Gumaro Avalos MD  11/15/18  8:45 AM    Dictated utilizing Dragon dictation

## 2018-11-15 NOTE — PROGRESS NOTES
"1020:     DATA:      Therapist met individually with patient this date.   Discussed progress in treatment and any needs/concerns. Patient reports that she is still afraid to return home because she hears voices there.  Therapist discussed discharge options with patient's mother Rolanda and she reports that if patient refuses to return home that this means that she is not at baseline and still paranoid and psychotic.  Patient's mother plans to visit [facility] and follow up with therapist tomorrow.      Therapist continues to provide emotional support and reassurance.  Patient reports thoughts of entering rehab, but is unsure.  She would need to stop all of her controlled medications and she is unsure if she can do this.  When asked if she has a drug problem patient reports, \"No, but I am so scared of home that I'm afraid to go back. Rehab seems like the only safe place for me.\"  Patient refuses to stay with her mother short term.        ASSESSMENT:     Patient observed to have restricted affect and congruent mood.  Patient remains suspicious and paranoid.  Patient guarded when talking with therapist, especially about safety planning and returning home. Likely at risk for decompensation and rapid readmission if discharged in current mental state.       Plan:    Patient to remain hospitalized this date. Aftercare scheduled with the [facility].  Anticipate possible return home once stable.   "

## 2018-11-16 LAB
BILIRUB UR QL STRIP: NEGATIVE
CLARITY UR: CLEAR
COLOR UR: YELLOW
GLUCOSE UR STRIP-MCNC: NEGATIVE MG/DL
HGB UR QL STRIP.AUTO: NEGATIVE
KETONES UR QL STRIP: NEGATIVE
LEUKOCYTE ESTERASE UR QL STRIP.AUTO: NEGATIVE
NITRITE UR QL STRIP: NEGATIVE
PH UR STRIP.AUTO: 7 [PH] (ref 5–8)
PROT UR QL STRIP: NEGATIVE
SP GR UR STRIP: 1.01 (ref 1–1.03)
UROBILINOGEN UR QL STRIP: NORMAL

## 2018-11-16 PROCEDURE — 99232 SBSQ HOSP IP/OBS MODERATE 35: CPT | Performed by: PSYCHIATRY & NEUROLOGY

## 2018-11-16 PROCEDURE — 81003 URINALYSIS AUTO W/O SCOPE: CPT | Performed by: PSYCHIATRY & NEUROLOGY

## 2018-11-16 RX ADMIN — CETIRIZINE HYDROCHLORIDE AND PSEUDOEPHEDRINE HYDROCHLORIDE 1 TABLET: 5; 120 TABLET, FILM COATED, EXTENDED RELEASE ORAL at 08:15

## 2018-11-16 RX ADMIN — HYDROXYZINE HYDROCHLORIDE 50 MG: 50 TABLET, FILM COATED ORAL at 09:49

## 2018-11-16 RX ADMIN — BENZONATATE 100 MG: 100 CAPSULE ORAL at 20:55

## 2018-11-16 RX ADMIN — METOPROLOL TARTRATE 50 MG: 50 TABLET, FILM COATED ORAL at 08:14

## 2018-11-16 RX ADMIN — IBUPROFEN 600 MG: 600 TABLET ORAL at 08:16

## 2018-11-16 RX ADMIN — MIRTAZAPINE 30 MG: 15 TABLET, FILM COATED ORAL at 20:55

## 2018-11-16 RX ADMIN — QUETIAPINE FUMARATE 100 MG: 100 TABLET ORAL at 20:55

## 2018-11-16 RX ADMIN — ARIPIPRAZOLE 5 MG: 10 TABLET ORAL at 08:14

## 2018-11-16 RX ADMIN — POTASSIUM CHLORIDE 10 MEQ: 750 TABLET, FILM COATED, EXTENDED RELEASE ORAL at 08:14

## 2018-11-16 RX ADMIN — IBUPROFEN 600 MG: 600 TABLET ORAL at 20:54

## 2018-11-16 RX ADMIN — NICOTINE 1 PATCH: 21 PATCH TRANSDERMAL at 08:15

## 2018-11-16 RX ADMIN — METOPROLOL TARTRATE 50 MG: 50 TABLET, FILM COATED ORAL at 20:55

## 2018-11-16 RX ADMIN — IBUPROFEN 600 MG: 600 TABLET ORAL at 13:54

## 2018-11-16 RX ADMIN — HYDROXYZINE HYDROCHLORIDE 50 MG: 50 TABLET, FILM COATED ORAL at 16:49

## 2018-11-16 NOTE — PROGRESS NOTES
1138:      DATA:      Therapist met individually with patient this date.  Discussed progress in treatment and any needs/concerns. We contacted patient's mother Rolanda at [phone].  Rolanda remains supportive and visited patient last night. She reports that patient was doing some better, but remains paranoid about going home. It was decided that patient will go home with her mother for a few days to ensure she is still progressing before returning home by her self.  Anticipate patient to remain hospitalized this weekend.      Therapist educated patient about her illness and how to reframe suspicious thinking.  Encouraged to her to reframe her thinking about her home.  Patient was able to say that she has lived there for years and has never been harmed or had real evidence that she was being harmed. She also stated that her dog would alert her if someone was at home home and he has not indicated anything was wrong.  Therapist also educated patient about the [facility] and encouraged her to utilize this resource if she returns home and needs more support than outpatient.  Patient agreeable.     ASSESSMENT:     Patient observed to have restricted affect and congruent mood. Patient reports that she still feels worried and suspicious about her home. She reports that things are moved around in her home sometimes and there is no explaination.  Patient somewhat receptive to education regarding symptoms of her illness causing paranoia and suspicious thinking.        Plan:    Patient to remain hospitalized this date.  Aftercare scheduled with [facility]. Patient to return home with her mother Rolanda at discharge.

## 2018-11-16 NOTE — PLAN OF CARE
Problem: Patient Care Overview  Goal: Plan of Care Review  Outcome: Ongoing (interventions implemented as appropriate)  Pt. Verbalizes slept 7 anx. 3 denies any s/i denies h/i denies a/v hallucinations behavior calm some interaction noted     Problem: Overarching Goals (Adult)  Goal: Adheres to Safety Considerations for Self and Others  Outcome: Ongoing (interventions implemented as appropriate)    Goal: Optimized Coping Skills in Response to Life Stressors  Outcome: Ongoing (interventions implemented as appropriate)    Goal: Develops/Participates in Therapeutic [place] to Support Successful Transition  Outcome: Ongoing (interventions implemented as appropriate)

## 2018-11-16 NOTE — PROGRESS NOTES
"INPATIENT PSYCHIATRIC PROGRESS NOTE    Name:  Cintia Issa  :  1985  MRN:  [id number]  Visit Number:  [id number]  Length of stay:  3    Behavioral Health Treatment Plan and Problem List: I have reviewed and approved the Behavioral Health Treatment Plan and Problem list.    SUBJECTIVE    CC/ Focus of exam: psychosis/ paranoia    Patient's subjective status: \"Still not sure what to do\".     INTERVAL HISTORY: remains fearful about returning home sighting \"noises\". Isolation, lack of transportation, financial limitations all are factors in addition to her paranoia.     Patient has no inteest in IOP either here or with the [facility], normally follow up at the [facility] with ROBIN Freeman and YENNI Chanel.    Anticipate discharging the patient first of next week.      Depression rating 0/10  Anxiety rating 0/10  Sleep: >8 hours  Withdrawal sx: none  Cravin/10 \"my back hurts and stuff and wish I had my pain medicine\".        Review of Systems   Respiratory: Negative.    Cardiovascular: Negative.    Musculoskeletal: Positive for back pain.         OBJECTIVE    Temp:  [97.3 °F (36.3 °C)-97.4 °F (36.3 °C)] 97.3 °F (36.3 °C)  Heart Rate:  [75-77] 75  Resp:  [18] 18  BP: (111-122)/(76-80) 111/76    MENTAL STATUS EXAM:      Appearance:Casually dressed, good hygeine.   Cooperation:Cooperative  Psychomotor: No psychomotor agitation/retardation, No EPS, No motor tics  Speech-normal rate, amount.   Mood/Affect:  Restricted  Thought Processes:  coherent  Thought Content:  paranoid  Hallucination(s): auditory  Hopelessness: No  Optimistic:minimally  Suicidal Thoughts:   No  Suicidal Plan/Intent:  No  Homicidal Thoughts:  absent  Orientation: oriented x 3  Memory: recent intact    Lab Results (last 24 hours)     ** No results found for the last 24 hours. **           Imaging Results (last 24 hours)     ** No results found for the last 24 hours. **           ECG/EMG Results (most recent)     Procedure " Component Value Units Date/Time    ECG 12 Lead [[id number]] Collected:  11/13/18 1619     Updated:  11/13/18 2009    Narrative:       Test Reason : Potential adverse reaction to medications.  Blood Pressure : **/** mmHG  Vent. Rate : 074 BPM     Atrial Rate : 074 BPM     P-R Int : 134 ms          QRS Dur : 084 ms      QT Int : 410 ms       P-R-T Axes : 062 070 072 degrees     QTc Int : 455 ms    Normal sinus rhythm  Normal ECG  When compared with ECG of 13-NOV-2018 16:19, (Unconfirmed)  No significant change was found  Confirmed by Akshat Perea ([id number]) on 11/13/2018 8:09:19 PM    Referred By:  MADAN           Confirmed By:Akshat Perea           ALLERGIES: Elavil [amitriptyline]      Current Facility-Administered Medications:   •  aluminum-magnesium hydroxide-simethicone (MAALOX MAX) 400-400-40 MG/5ML suspension 15 mL, 15 mL, Oral, Q6H PRN, Pattie Mondragon MD  •  ARIPiprazole (ABILIFY) tablet 5 mg, 5 mg, Oral, Daily, Gumaro Avalos MD, 5 mg at 11/16/18 0814  •  benzonatate (TESSALON) capsule 100 mg, 100 mg, Oral, TID PRN, Pattie Mondragon MD  •  benztropine (COGENTIN) tablet 1 mg, 1 mg, Oral, Daily PRN **OR** benztropine (COGENTIN) injection 0.5 mg, 0.5 mg, Intramuscular, Daily PRN, Pattie Mondragon MD  •  cetirizine-pseudoephedrine (ZyrTEC-D) 5-120 MG per 12 hr tablet 1 tablet, 1 tablet, Oral, BID PRN, Gumaro Avalos MD, 1 tablet at 11/16/18 0815  •  famotidine (PEPCID) tablet 20 mg, 20 mg, Oral, BID PRN, Pattie Mondragon MD  •  hydrOXYzine (ATARAX) tablet 50 mg, 50 mg, Oral, Q6H PRN, Pattie Mondragon MD, 50 mg at 11/15/18 2137  •  ibuprofen (ADVIL,MOTRIN) tablet 600 mg, 600 mg, Oral, Q6H PRN, Pattie Mondragon MD, 600 mg at 11/16/18 0816  •  loperamide (IMODIUM) capsule 2 mg, 2 mg, Oral, 4x Daily PRN, Pattie Mondragon MD  •  magnesium hydroxide (MILK OF MAGNESIA) suspension 2400 mg/10mL 10 mL, 10 mL, Oral, Daily PRN, Pattie Mondragon MD  •  metoprolol tartrate (LOPRESSOR) tablet 50 mg, 50 mg, Oral,  Q12H, Pattie Mondragon MD, 50 mg at 11/16/18 0814  •  mirtazapine (REMERON) tablet 30 mg, 30 mg, Oral, Nightly, Gumaro Avalos MD, 30 mg at 11/15/18 2041  •  nicotine (NICODERM CQ) 21 MG/24HR patch 1 patch, 1 patch, Transdermal, Q24H, Pattie Mondragon MD, 1 patch at 11/16/18 0815  •  OLANZapine zydis (zyPREXA) disintegrating tablet 5 mg, 5 mg, Oral, Q8H PRN, Pattie Mondragon MD  •  ondansetron (ZOFRAN) tablet 4 mg, 4 mg, Oral, Q6H PRN, Pattie Mondragon MD  •  potassium chloride (K-DUR,KLOR-CON) ER tablet 10 mEq, 10 mEq, Oral, Daily, Pattie Mondragon MD, 10 mEq at 11/16/18 0814  •  QUEtiapine (SEROquel) tablet 100 mg, 100 mg, Oral, Nightly, Ptatie Mondragon MD, 100 mg at 11/15/18 2041  •  sodium chloride (OCEAN) nasal spray 2 spray, 2 spray, Each Nare, PRN, Pattie Mondragon MD  •  traZODone (DESYREL) tablet 50 mg, 50 mg, Oral, Nightly PRN, Pattie Mondragon MD, 50 mg at 11/15/18 2129    No medication related complaints.     ASSESSMENT & PLAN      Schizophrenia, paranoid (CMS/HCC)  -Seroquel, Prolixin, Remeron plus individual and group psychotherapeutic efforts Prolixin decanoate 25 mg IM yesterday.   Essential hypertension  -Lopressor           Suicide precautions: Suicide precaution Level 3 (q15 min checks)     Behavioral Health Treatment Plan and Problem List: I have reviewed and approved the Behavioral Health Treatment Plan and Problem list.     I spent a total of 25 minutes coordinating the patient's care today;  15 minutes were spent face to face with the patient counseling on paranoia, the current treatment and follow up plan.     Clinician:  Gumaro Avalos MD  11/16/18  8:36 AM    Dictated utilizing Dragon dictation

## 2018-11-17 LAB
BASOPHILS # BLD AUTO: 0.02 10*3/MM3 (ref 0–0.3)
BASOPHILS NFR BLD AUTO: 0.2 % (ref 0–2)
DEPRECATED RDW RBC AUTO: 40 FL (ref 37–54)
EOSINOPHIL # BLD AUTO: 0.22 10*3/MM3 (ref 0–0.7)
EOSINOPHIL NFR BLD AUTO: 2.1 % (ref 0–5)
ERYTHROCYTE [DISTWIDTH] IN BLOOD BY AUTOMATED COUNT: 12.2 % (ref 11.5–14.5)
HCT VFR BLD AUTO: 38.5 % (ref 37–47)
HGB BLD-MCNC: 12.9 G/DL (ref 12–16)
IMM GRANULOCYTES # BLD: 0.01 10*3/MM3 (ref 0–0.03)
IMM GRANULOCYTES NFR BLD: 0.1 % (ref 0–0.5)
LYMPHOCYTES # BLD AUTO: 3.58 10*3/MM3 (ref 1–3)
LYMPHOCYTES NFR BLD AUTO: 34.4 % (ref 21–51)
MCH RBC QN AUTO: 31 PG (ref 27–33)
MCHC RBC AUTO-ENTMCNC: 33.5 G/DL (ref 33–37)
MCV RBC AUTO: 92.5 FL (ref 80–94)
MONOCYTES # BLD AUTO: 0.85 10*3/MM3 (ref 0.1–0.9)
MONOCYTES NFR BLD AUTO: 8.2 % (ref 0–10)
NEUTROPHILS # BLD AUTO: 5.72 10*3/MM3 (ref 1.4–6.5)
NEUTROPHILS NFR BLD AUTO: 55 % (ref 30–70)
PLATELET # BLD AUTO: 293 10*3/MM3 (ref 130–400)
PMV BLD AUTO: 9.9 FL (ref 6–10)
RBC # BLD AUTO: 4.16 10*6/MM3 (ref 4.2–5.4)
WBC NRBC COR # BLD: 10.4 10*3/MM3 (ref 4.5–12.5)

## 2018-11-17 PROCEDURE — 99232 SBSQ HOSP IP/OBS MODERATE 35: CPT | Performed by: PSYCHIATRY & NEUROLOGY

## 2018-11-17 PROCEDURE — 85025 COMPLETE CBC W/AUTO DIFF WBC: CPT | Performed by: PSYCHIATRY & NEUROLOGY

## 2018-11-17 RX ORDER — BENZTROPINE MESYLATE 1 MG/1
0.5 TABLET ORAL NIGHTLY
Status: DISCONTINUED | OUTPATIENT
Start: 2018-11-17 | End: 2018-11-17

## 2018-11-17 RX ORDER — BENZTROPINE MESYLATE 1 MG/1
1 TABLET ORAL NIGHTLY
Status: DISCONTINUED | OUTPATIENT
Start: 2018-11-17 | End: 2018-11-19 | Stop reason: HOSPADM

## 2018-11-17 RX ADMIN — TRAZODONE HYDROCHLORIDE 50 MG: 50 TABLET ORAL at 22:01

## 2018-11-17 RX ADMIN — METOPROLOL TARTRATE 50 MG: 50 TABLET, FILM COATED ORAL at 08:24

## 2018-11-17 RX ADMIN — NICOTINE 1 PATCH: 21 PATCH TRANSDERMAL at 08:25

## 2018-11-17 RX ADMIN — IBUPROFEN 600 MG: 600 TABLET ORAL at 20:45

## 2018-11-17 RX ADMIN — HYDROXYZINE HYDROCHLORIDE 50 MG: 50 TABLET, FILM COATED ORAL at 14:36

## 2018-11-17 RX ADMIN — BENZONATATE 100 MG: 100 CAPSULE ORAL at 08:26

## 2018-11-17 RX ADMIN — CETIRIZINE HYDROCHLORIDE AND PSEUDOEPHEDRINE HYDROCHLORIDE 1 TABLET: 5; 120 TABLET, FILM COATED, EXTENDED RELEASE ORAL at 10:54

## 2018-11-17 RX ADMIN — POTASSIUM CHLORIDE 10 MEQ: 750 TABLET, FILM COATED, EXTENDED RELEASE ORAL at 08:24

## 2018-11-17 RX ADMIN — IBUPROFEN 600 MG: 600 TABLET ORAL at 14:36

## 2018-11-17 RX ADMIN — IBUPROFEN 600 MG: 600 TABLET ORAL at 08:24

## 2018-11-17 RX ADMIN — HYDROXYZINE HYDROCHLORIDE 50 MG: 50 TABLET, FILM COATED ORAL at 08:24

## 2018-11-17 RX ADMIN — MIRTAZAPINE 30 MG: 15 TABLET, FILM COATED ORAL at 20:45

## 2018-11-17 RX ADMIN — METOPROLOL TARTRATE 50 MG: 50 TABLET, FILM COATED ORAL at 20:44

## 2018-11-17 RX ADMIN — BENZTROPINE MESYLATE 1 MG: 1 TABLET ORAL at 20:45

## 2018-11-17 RX ADMIN — QUETIAPINE FUMARATE 100 MG: 100 TABLET ORAL at 20:44

## 2018-11-17 NOTE — PLAN OF CARE
Problem: Patient Care Overview  Goal: Plan of Care Review  Outcome: Ongoing (interventions implemented as appropriate)  Patient reports good appetite and sleep; rates anxiety 4; pt up to dayroom for free time; calm and cooperative; c/o headache rates 3.   11/17/18 0134   Coping/Psychosocial   Plan of Care Reviewed With patient   Coping/Psychosocial   Patient Agreement with Plan of Care agrees   Plan of Care Review   Progress improving

## 2018-11-17 NOTE — PLAN OF CARE
Problem: Patient Care Overview  Goal: Plan of Care Review  Outcome: Ongoing (interventions implemented as appropriate)  Patient has been out to dayroom most of this shift, interacting well with other patients on the unit and has been cooperative with care. Patient denies depression but reports anxiety at 3 on 0-10 scale with no HI, SI, or AVH reported. Patient has a good appetite and reports good sleep last night as well and reports she is feeling better and medications seem to be helping her today. Will continue to monitor.   11/17/18 1609   Coping/Psychosocial   Plan of Care Reviewed With patient   Coping/Psychosocial   Patient Agreement with Plan of Care agrees   Plan of Care Review   Progress improving     Goal: Individualization and Mutuality  Outcome: Ongoing (interventions implemented as appropriate)    Goal: Discharge Needs Assessment  Outcome: Ongoing (interventions implemented as appropriate)    Goal: Interprofessional Rounds/Family Conf  Outcome: Ongoing (interventions implemented as appropriate)      Problem: Overarching Goals (Adult)  Goal: Adheres to Safety Considerations for Self and Others  Outcome: Ongoing (interventions implemented as appropriate)    Goal: Optimized Coping Skills in Response to Life Stressors  Outcome: Ongoing (interventions implemented as appropriate)    Goal: Develops/Participates in Therapeutic [place] to Support Successful Transition  Outcome: Ongoing (interventions implemented as appropriate)

## 2018-11-17 NOTE — PROGRESS NOTES
"      Inpatient Psy Progress Note   Clinician: Braden Avalos MD  Admission Date: 11/13/2018  2:47 PM 11/17/18    Behavioral Health Treatment Plan and Problem List: I have reviewed and approved the Behavioral Health Treatment Plan and Problem list.    Allergies  Allergies   Allergen Reactions   • Elavil [Amitriptyline] Nausea Only       Hospital Day: 4 days      Assessment completed within view of staff    History  CC: inpatient followup  Interval HPI: Patient seen and evaluated by me.  Chart reviewed. She c/o some anxiety today.  Denies AH today.  Some paranoia.    She c/o some RLS at night since starting the Abilify.  She is not experiencing symptoms of akathesia during the daytime hours.    Interval Review of Systems:   General ROS: negative for - fever or malaise  Endocrine ROS: negative for - palpitations  Respiratory ROS: no cough, shortness of breath, or wheezing  Cardiovascular ROS: no chest pain or dyspnea on exertion  Gastrointestinal ROS: no abdominal pain,no black or bloody stools    /80 (BP Location: Right arm, Patient Position: Sitting)   Pulse 85   Temp 97.4 °F (36.3 °C) (Temporal)   Resp 18   Ht 167.6 cm (66\")   Wt 73.6 kg (162 lb 3.2 oz)   SpO2 99%   BMI 26.18 kg/m²     Mental Status Exam  Mood: anxious  Affect: mood-congruent   Thought Processes: linear, logical, and goal directed  Thought Content: paranoid  Hallucinations: no  Suicidal Thoughts: denies  Suicidal Plan/Intent:denies  Hopelesness:Mild  Homicidal Thoughts:  absent      Medical Decision Making:   Labs:     Lab Results (last 24 hours)     Procedure Component Value Units Date/Time    CBC & Differential [[id number]] Collected:  11/17/18 0457    Specimen:  Blood Updated:  11/17/18 0601    Narrative:       The following orders were created for panel order CBC & Differential.  Procedure                               Abnormality         Status                     ---------                               -----------         ------   "                   CBC Auto Differential[[id number]]        Abnormal            Final result                 Please view results for these tests on the individual orders.    CBC Auto Differential [[id number]]  (Abnormal) Collected:  11/17/18 0457    Specimen:  Blood Updated:  11/17/18 0601     WBC 10.40 10*3/mm3      RBC 4.16 10*6/mm3      Hemoglobin 12.9 g/dL      Hematocrit 38.5 %      MCV 92.5 fL      MCH 31.0 pg      MCHC 33.5 g/dL      RDW 12.2 %      RDW-SD 40.0 fl      MPV 9.9 fL      Platelets 293 10*3/mm3      Neutrophil % 55.0 %      Lymphocyte % 34.4 %      Monocyte % 8.2 %      Eosinophil % 2.1 %      Basophil % 0.2 %      Immature Grans % 0.1 %      Neutrophils, Absolute 5.72 10*3/mm3      Lymphocytes, Absolute 3.58 10*3/mm3      Monocytes, Absolute 0.85 10*3/mm3      Eosinophils, Absolute 0.22 10*3/mm3      Basophils, Absolute 0.02 10*3/mm3      Immature Grans, Absolute 0.01 10*3/mm3     Urinalysis With Microscopic If Indicated (No Culture) - Urine, Clean Catch [[id number]]  (Normal) Collected:  11/16/18 2017    Specimen:  Urine, Clean Catch Updated:  11/16/18 2244     Color, UA Yellow     Appearance, UA Clear     pH, UA 7.0     Specific Gravity, UA 1.010     Glucose, UA Negative     Ketones, UA Negative     Bilirubin, UA Negative     Blood, UA Negative     Protein, UA Negative     Leuk Esterase, UA Negative     Nitrite, UA Negative     Urobilinogen, UA 0.2 E.U./dL    Narrative:       Urine microscopic not indicated.            Radiology:     Imaging Results (last 24 hours)     ** No results found for the last 24 hours. **            EKG:     ECG/EMG Results (most recent)     Procedure Component Value Units Date/Time    ECG 12 Lead [[id number]] Collected:  11/13/18 1619     Updated:  11/13/18 2009    Narrative:       Test Reason : Potential adverse reaction to medications.  Blood Pressure : **/** mmHG  Vent. Rate : 074 BPM     Atrial Rate : 074 BPM     P-R Int : 134 ms          QRS Dur : 084 ms      QT Int  : 410 ms       P-R-T Axes : 062 070 072 degrees     QTc Int : 455 ms    Normal sinus rhythm  Normal ECG  When compared with ECG of 13-NOV-2018 16:19, (Unconfirmed)  No significant change was found  Confirmed by Akshat Perea ([id number]) on 11/13/2018 8:09:19 PM    Referred By:  MADAN           Confirmed By:Akshat Perea           Medications:     ARIPiprazole 5 mg Oral Daily   metoprolol tartrate 50 mg Oral Q12H   mirtazapine 30 mg Oral Nightly   nicotine 1 patch Transdermal Q24H   potassium chloride 10 mEq Oral Daily   QUEtiapine 100 mg Oral Nightly          All medications reviewed.      Assessment and Plan:    Schizophrenia, paranoid (CMS/HCC)  -Seroquel, Abilify, Remeron plus individual and group psychotherapeutic efforts Prolixin decanoate 25 mg IM yesterday.     RLS    - Will start a low dose of cogentin at bedtime to see if this helps.      Essential hypertension  -Lopressor        Continue hospitalization for safety and stabilization.  Continue current level of Special Precautions (q15 minute checks).

## 2018-11-18 PROCEDURE — 99232 SBSQ HOSP IP/OBS MODERATE 35: CPT | Performed by: PSYCHIATRY & NEUROLOGY

## 2018-11-18 RX ORDER — DOXEPIN HYDROCHLORIDE 25 MG/1
25 CAPSULE ORAL NIGHTLY
Status: DISCONTINUED | OUTPATIENT
Start: 2018-11-18 | End: 2018-11-19 | Stop reason: HOSPADM

## 2018-11-18 RX ORDER — BENZONATATE 100 MG/1
100 CAPSULE ORAL 3 TIMES DAILY PRN
Status: DISCONTINUED | OUTPATIENT
Start: 2018-11-18 | End: 2018-11-19 | Stop reason: HOSPADM

## 2018-11-18 RX ADMIN — BENZONATATE 100 MG: 100 CAPSULE ORAL at 11:20

## 2018-11-18 RX ADMIN — CETIRIZINE HYDROCHLORIDE AND PSEUDOEPHEDRINE HYDROCHLORIDE 1 TABLET: 5; 120 TABLET, FILM COATED, EXTENDED RELEASE ORAL at 08:29

## 2018-11-18 RX ADMIN — NICOTINE 1 PATCH: 21 PATCH TRANSDERMAL at 08:27

## 2018-11-18 RX ADMIN — MIRTAZAPINE 30 MG: 15 TABLET, FILM COATED ORAL at 20:39

## 2018-11-18 RX ADMIN — BENZTROPINE MESYLATE 1 MG: 1 TABLET ORAL at 20:39

## 2018-11-18 RX ADMIN — ARIPIPRAZOLE 5 MG: 10 TABLET ORAL at 08:27

## 2018-11-18 RX ADMIN — DOXEPIN HYDROCHLORIDE 25 MG: 25 CAPSULE ORAL at 20:39

## 2018-11-18 RX ADMIN — IBUPROFEN 600 MG: 600 TABLET ORAL at 20:40

## 2018-11-18 RX ADMIN — METOPROLOL TARTRATE 50 MG: 50 TABLET, FILM COATED ORAL at 20:39

## 2018-11-18 RX ADMIN — METOPROLOL TARTRATE 50 MG: 50 TABLET, FILM COATED ORAL at 08:27

## 2018-11-18 RX ADMIN — IBUPROFEN 600 MG: 600 TABLET ORAL at 08:29

## 2018-11-18 RX ADMIN — HYDROXYZINE HYDROCHLORIDE 50 MG: 50 TABLET, FILM COATED ORAL at 14:35

## 2018-11-18 RX ADMIN — POTASSIUM CHLORIDE 10 MEQ: 750 TABLET, FILM COATED, EXTENDED RELEASE ORAL at 08:27

## 2018-11-18 NOTE — PLAN OF CARE
Problem: Patient Care Overview  Goal: Plan of Care Review  Outcome: Ongoing (interventions implemented as appropriate)  Patient out to dayroom most of this day and is interacting well. Patient denies depression and reports anxiety at 3/10. Patient sleep and appetite have been good this shift. Denies SI, HI, or AVH. Will continue to monit   11/18/18 8931   Coping/Psychosocial   Plan of Care Reviewed With patient   Coping/Psychosocial   Patient Agreement with Plan of Care agrees   Plan of Care Review   Progress improving   or.  Goal: Individualization and Mutuality  Outcome: Ongoing (interventions implemented as appropriate)    Goal: Discharge Needs Assessment  Outcome: Ongoing (interventions implemented as appropriate)    Goal: Interprofessional Rounds/Family Conf  Outcome: Ongoing (interventions implemented as appropriate)      Problem: Overarching Goals (Adult)  Goal: Adheres to Safety Considerations for Self and Others  Outcome: Ongoing (interventions implemented as appropriate)    Goal: Optimized Coping Skills in Response to Life Stressors  Outcome: Ongoing (interventions implemented as appropriate)    Goal: Develops/Participates in Therapeutic [place] to Support Successful Transition  Outcome: Ongoing (interventions implemented as appropriate)

## 2018-11-18 NOTE — PLAN OF CARE
Problem: Patient Care Overview  Goal: Plan of Care Review  Outcome: Ongoing (interventions implemented as appropriate)  Patient up to dayroom for free time and snack; pt interacting well with peers; reports good appetite and sleep; rates anxiety 3; denies depression/SI/HI/AVH; reoriented to time; c/o pain to back, legs, and headache rates 4/10; pt calm and cooperative.   11/18/18 0208   Coping/Psychosocial   Plan of Care Reviewed With patient   Coping/Psychosocial   Patient Agreement with Plan of Care agrees   Plan of Care Review   Progress improving

## 2018-11-18 NOTE — PROGRESS NOTES
"      Inpatient Psy Progress Note   Clinician: Braden Avalos MD  Admission Date: 11/13/2018  8:09 AM 11/18/18    Behavioral Health Treatment Plan and Problem List: I have reviewed and approved the Behavioral Health Treatment Plan and Problem list.    Allergies  Allergies   Allergen Reactions   • Elavil [Amitriptyline] Nausea Only       Hospital Day: 5 days      Assessment completed within view of staff    History  CC: inpatient followup  Interval HPI: Patient seen and evaluated by me.  Chart reviewed. She c/o persistent Sx of RLS starting after bedtime. No akathesia.  Denies AH today.            Interval Review of Systems:   General ROS: negative for - fever or malaise.  C/o sore throat.   Endocrine ROS: negative for - palpitations  Respiratory ROS: no cough, shortness of breath, or wheezing  Cardiovascular ROS: no chest pain or dyspnea on exertion  Gastrointestinal ROS: no abdominal pain,no black or bloody stools    /80 (BP Location: Right arm, Patient Position: Sitting)   Pulse 92   Temp 98.2 °F (36.8 °C) (Temporal)   Resp 18   Ht 167.6 cm (66\")   Wt 73.6 kg (162 lb 3.2 oz)   SpO2 100%   BMI 26.18 kg/m²     Mental Status Exam  Mood: dysphoric  Affect: dysphoric   Thought Processes: linear, logical, and goal directed  Thought Content: negativistic  Hallucinations: no  Suicidal Thoughts: denies  Suicidal Plan/Intent:denies  Hopelesness:Mild  Homicidal Thoughts:  absent      Medical Decision Making:   Labs:     Lab Results (last 24 hours)     ** No results found for the last 24 hours. **            Radiology:     Imaging Results (last 24 hours)     ** No results found for the last 24 hours. **            EKG:     ECG/EMG Results (most recent)     Procedure Component Value Units Date/Time    ECG 12 Lead [[id number]] Collected:  11/13/18 1619     Updated:  11/13/18 2009    Narrative:       Test Reason : Potential adverse reaction to medications.  Blood Pressure : **/** mmHG  Vent. Rate : 074 BPM     Atrial " Rate : 074 BPM     P-R Int : 134 ms          QRS Dur : 084 ms      QT Int : 410 ms       P-R-T Axes : 062 070 072 degrees     QTc Int : 455 ms    Normal sinus rhythm  Normal ECG  When compared with ECG of 13-NOV-2018 16:19, (Unconfirmed)  No significant change was found  Confirmed by Akshat Perea ([id number]) on 11/13/2018 8:09:19 PM    Referred By:  MADAN           Confirmed By:Akshat Perea           Medications:     ARIPiprazole 5 mg Oral Daily   benztropine 1 mg Oral Nightly   metoprolol tartrate 50 mg Oral Q12H   mirtazapine 30 mg Oral Nightly   nicotine 1 patch Transdermal Q24H   potassium chloride 10 mEq Oral Daily   QUEtiapine 100 mg Oral Nightly          All medications reviewed.      Assessment and Plan:   Schizophrenia, paranoid (CMS/HCC)  -Abilify, Remeron plus individual and group psychotherapeutic efforts Prolixin decanoate 25 mg IM yesterday.   - Stop Seroquel to see if that may be causing the RLS at night.  - Add doxepin instead to help with sleep.    URI    - zyretec  - Cough drops, tessalon pearls.      Essential hypertension  -Lopressor       Continue hospitalization for safety and stabilization.  Continue current level of Special Precautions (q15 minute checks).

## 2018-11-19 VITALS
BODY MASS INDEX: 26.07 KG/M2 | OXYGEN SATURATION: 99 % | WEIGHT: 162.2 LBS | RESPIRATION RATE: 18 BRPM | DIASTOLIC BLOOD PRESSURE: 77 MMHG | HEART RATE: 91 BPM | SYSTOLIC BLOOD PRESSURE: 117 MMHG | TEMPERATURE: 98.2 F | HEIGHT: 66 IN

## 2018-11-19 PROCEDURE — 99239 HOSP IP/OBS DSCHRG MGMT >30: CPT | Performed by: PSYCHIATRY & NEUROLOGY

## 2018-11-19 RX ORDER — METOPROLOL TARTRATE 50 MG/1
50 TABLET, FILM COATED ORAL EVERY 12 HOURS SCHEDULED
Qty: 60 TABLET | Refills: 0 | Status: ON HOLD | OUTPATIENT
Start: 2018-11-19 | End: 2022-08-02

## 2018-11-19 RX ORDER — MIRTAZAPINE 30 MG/1
30 TABLET, FILM COATED ORAL NIGHTLY
Qty: 30 TABLET | Refills: 0 | Status: SHIPPED | OUTPATIENT
Start: 2018-11-19 | End: 2018-11-21 | Stop reason: SDUPTHER

## 2018-11-19 RX ORDER — POTASSIUM CHLORIDE 750 MG/1
10 TABLET, FILM COATED, EXTENDED RELEASE ORAL DAILY
Qty: 30 TABLET | Refills: 0 | Status: ON HOLD | OUTPATIENT
Start: 2018-11-20 | End: 2019-03-03

## 2018-11-19 RX ORDER — ARIPIPRAZOLE 5 MG/1
5 TABLET ORAL DAILY
Qty: 30 TABLET | Refills: 0 | Status: SHIPPED | OUTPATIENT
Start: 2018-11-20 | End: 2018-11-21 | Stop reason: SDUPTHER

## 2018-11-19 RX ADMIN — TRAZODONE HYDROCHLORIDE 50 MG: 50 TABLET ORAL at 00:37

## 2018-11-19 RX ADMIN — CETIRIZINE HYDROCHLORIDE AND PSEUDOEPHEDRINE HYDROCHLORIDE 1 TABLET: 5; 120 TABLET, FILM COATED, EXTENDED RELEASE ORAL at 08:22

## 2018-11-19 RX ADMIN — METOPROLOL TARTRATE 50 MG: 50 TABLET, FILM COATED ORAL at 08:19

## 2018-11-19 RX ADMIN — POTASSIUM CHLORIDE 10 MEQ: 750 TABLET, FILM COATED, EXTENDED RELEASE ORAL at 08:19

## 2018-11-19 RX ADMIN — NICOTINE 1 PATCH: 21 PATCH TRANSDERMAL at 08:20

## 2018-11-19 RX ADMIN — BENZONATATE 100 MG: 100 CAPSULE ORAL at 08:22

## 2018-11-19 RX ADMIN — IBUPROFEN 600 MG: 600 TABLET ORAL at 08:22

## 2018-11-19 RX ADMIN — ARIPIPRAZOLE 5 MG: 10 TABLET ORAL at 08:19

## 2018-11-19 NOTE — DISCHARGE SUMMARY
Date of Discharge:  11/19/2018    Discharge Diagnosis:Active Problems:    Essential hypertension    Schizophrenia, paranoid (CMS/HCC)    Major depressive disorder, recurrent (CMS/HCC)        Presenting Problem/History of Present Illness: Patient admitted to the hospital on November 13 after she had presented in the [facility] emergency room agitated and bizarre with prominent paranoid delusion, voluntarily admitted for safety evaluation and treatment, see admission note for details     Hospital Course:  Patient was admitted for safety and stabilization and was placed on standard precautions.  Routine labs were checked.  Patient was assigned a masters level therapist and provided with an opportunity to participate in group and individual therapy on the unit.  Patient seen on a daily basis for evaluation and supportive therapy. patient's psychotropic medication format included depo Prolixin decanoate 25 mg November 14 along with po Abilify and Remeron.  Abilify perhaps will help notify the prolactin effect of the Prolixin.  Patient's paranoia and associated psychotic thought content dissipated gradually during her hospital stay, the paranoid delusions were in fact prohibitive to her returning home for the delusions involved the home environment.  Patient felt safe for discharge on November 19 planning to spend several days with her mother prior to returning to her own home.  Patient to follow-up at the [facility] clinic where she sees both therapist Rodrigo Chanel and ROBIN Freeman.  Patient was free of any thoughts of harming self or others, essentially free of any demonstrated psychotic thought content.  Patient cautioned regarding tardive dyskinesia accepting a risk for now, did suggest perhaps eventually changing to a second generation antipsychotic with slightly less potential for TD.  Patient had been reluctant to take any depo aside from Prolixin.  See below for medications.      Consults:   Consults     No  orders found from 10/15/2018 to 11/14/2018.          Labs:  Lab Results (all)     Procedure Component Value Units Date/Time    CBC & Differential [[id number]] Collected:  11/17/18 0457    Specimen:  Blood Updated:  11/17/18 0601    Narrative:       The following orders were created for panel order CBC & Differential.  Procedure                               Abnormality         Status                     ---------                               -----------         ------                     CBC Auto Differential[[id number]]        Abnormal            Final result                 Please view results for these tests on the individual orders.    CBC Auto Differential [[id number]]  (Abnormal) Collected:  11/17/18 0457    Specimen:  Blood Updated:  11/17/18 0601     WBC 10.40 10*3/mm3      RBC 4.16 10*6/mm3      Hemoglobin 12.9 g/dL      Hematocrit 38.5 %      MCV 92.5 fL      MCH 31.0 pg      MCHC 33.5 g/dL      RDW 12.2 %      RDW-SD 40.0 fl      MPV 9.9 fL      Platelets 293 10*3/mm3      Neutrophil % 55.0 %      Lymphocyte % 34.4 %      Monocyte % 8.2 %      Eosinophil % 2.1 %      Basophil % 0.2 %      Immature Grans % 0.1 %      Neutrophils, Absolute 5.72 10*3/mm3      Lymphocytes, Absolute 3.58 10*3/mm3      Monocytes, Absolute 0.85 10*3/mm3      Eosinophils, Absolute 0.22 10*3/mm3      Basophils, Absolute 0.02 10*3/mm3      Immature Grans, Absolute 0.01 10*3/mm3     Urinalysis With Microscopic If Indicated (No Culture) - Urine, Clean Catch [[id number]]  (Normal) Collected:  11/16/18 2017    Specimen:  Urine, Clean Catch Updated:  11/16/18 7119     Color, UA Yellow     Appearance, UA Clear     pH, UA 7.0     Specific Gravity, UA 1.010     Glucose, UA Negative     Ketones, UA Negative     Bilirubin, UA Negative     Blood, UA Negative     Protein, UA Negative     Leuk Esterase, UA Negative     Nitrite, UA Negative     Urobilinogen, UA 0.2 E.U./dL    Narrative:       Urine microscopic not indicated.           Imaging:  Imaging Results (all)     None                  Condition on Discharge:  improved    Prognosis: Fair    Vital Signs  Temp:  [96.6 °F (35.9 °C)-98.5 °F (36.9 °C)] 96.6 °F (35.9 °C)  Heart Rate:  [83-96] 83  Resp:  [18] 18  BP: (116-118)/(73-77) 118/73    Discharge Disposition  Home or Self Care    Discharge Medications     Discharge Medications      New Medications      Instructions Start Date   ARIPiprazole 5 MG tablet  Commonly known as:  ABILIFY   5 mg, Oral, Daily      potassium chloride 10 MEQ CR tablet  Commonly known as:  K-DUR  Replaces:  potassium chloride 10 MEQ CR tablet   10 mEq, Oral, Daily         Changes to Medications      Instructions Start Date   metoprolol tartrate 50 MG tablet  Commonly known as:  LOPRESSOR  What changed:  when to take this   50 mg, Oral, Every 12 Hours Scheduled      mirtazapine 30 MG tablet  Commonly known as:  REMERON  What changed:    · medication strength  · how much to take   30 mg, Oral, Nightly         Stop These Medications    DULoxetine 30 MG capsule  Commonly known as:  CYMBALTA     fluPHENAZine 5 MG tablet  Commonly known as:  PROLIXIN     hydrOXYzine 50 MG capsule  Commonly known as:  VISTARIL     oxyCODONE 10 MG tablet  Commonly known as:  ROXICODONE     oxyMORphone ER 5 MG 12 hr tablet  Commonly known as:  OPANA ER     potassium chloride 10 MEQ CR tablet  Commonly known as:  K-DUR,KLOR-CON  Replaced by:  potassium chloride 10 MEQ CR tablet     QUEtiapine 100 MG tablet  Commonly known as:  SEROquel            Discharge Diet:  regular    Activity at Discharge:  no restrictions    Follow-up Appointments: Will be followed at the [facility] primary care office by therapist and a APRN.          Gumaro Avalos MD  11/19/18  9:12 AM  Time spent with the discharge process >30 minutes.     Dictated utilizing Dragon dictation

## 2018-11-19 NOTE — PROGRESS NOTES
1015:     Therapist assisting with discharge plan this date. Patient denies SI/Hi and acute symptoms. Therapist contacted patient's mother Rolanda who is on way to  patient. Patient to stay with her mother for the time being. Both homes are reported to be safe guarded.  Therapist reviewed aftercare plans with Donna Freeman and Rodrigo Chanel.      Assisted patient in identifying risk factors which would indicate the need for higher level of care including thoughts to harm self or others and/or self-harming behavior and encouraged patient to contact this office, call 911, or present to the nearest emergency room should any of these events occur. Discussed crisis intervention services and means to access.  Patient adamantly and convincingly denies current suicidal or homicidal ideation or perceptual disturbance.

## 2018-11-19 NOTE — PLAN OF CARE
Problem: Patient Care Overview  Goal: Plan of Care Review  Outcome: Ongoing (interventions implemented as appropriate)  Patient up to dayroom for free time and snack; interacting well with peers; reports good appetite; frequent awaking at night r/t restless legs, states she spoke with MD r/t side effects of Seroquel and pt refused med this shift to see if it would reduce symptoms of restless legs; pt denies SI/HI/AVH; pt calm and cooperative; talkative; no other complaints voiced.   11/19/18 0017   Coping/Psychosocial   Plan of Care Reviewed With patient   Coping/Psychosocial   Patient Agreement with Plan of Care agrees   Plan of Care Review   Progress improving

## 2018-11-21 ENCOUNTER — OFFICE VISIT (OUTPATIENT)
Dept: PSYCHIATRY | Facility: CLINIC | Age: 33
End: 2018-11-21

## 2018-11-21 VITALS
BODY MASS INDEX: 27.64 KG/M2 | WEIGHT: 172 LBS | DIASTOLIC BLOOD PRESSURE: 73 MMHG | HEIGHT: 66 IN | HEART RATE: 81 BPM | SYSTOLIC BLOOD PRESSURE: 119 MMHG

## 2018-11-21 DIAGNOSIS — F25.9 SCHIZOAFFECTIVE DISORDER, UNSPECIFIED TYPE (HCC): Primary | ICD-10-CM

## 2018-11-21 PROCEDURE — 99214 OFFICE O/P EST MOD 30 MIN: CPT | Performed by: NURSE PRACTITIONER

## 2018-11-21 RX ORDER — ARIPIPRAZOLE 5 MG/1
5 TABLET ORAL DAILY
Qty: 30 TABLET | Refills: 0
Start: 2018-11-21 | End: 2018-12-05 | Stop reason: SDUPTHER

## 2018-11-21 RX ORDER — MIRTAZAPINE 30 MG/1
30 TABLET, FILM COATED ORAL NIGHTLY
Qty: 30 TABLET | Refills: 0
Start: 2018-11-21 | End: 2018-12-05 | Stop reason: SDUPTHER

## 2018-11-21 RX ORDER — FLUPHENAZINE HYDROCHLORIDE 5 MG/1
5 TABLET ORAL DAILY
Qty: 30 TABLET | Refills: 0 | Status: SHIPPED | OUTPATIENT
Start: 2018-11-21 | End: 2018-12-05 | Stop reason: SDUPTHER

## 2018-11-21 RX ORDER — HYDROXYZINE PAMOATE 25 MG/1
25 CAPSULE ORAL 3 TIMES DAILY PRN
Qty: 90 CAPSULE | Refills: 0 | Status: SHIPPED | OUTPATIENT
Start: 2018-11-21 | End: 2018-12-05 | Stop reason: SDUPTHER

## 2018-11-21 NOTE — PROGRESS NOTES
Subjective   Cintia Issa is a 33 y.o. female is here today for medication management follow-up after she was admitted to the [facility] for psychosis.  She presents with her mother with whom she gives permission to sp[[id number] in front of openly.     Chief Complaint: Follow up with psychosis    History of Present Illness She states that she has not been taking her Seroquel but states that it is causing her to have RLS.  She is not taking the Abilify because she was worried about the SE. She was started on Abilify in the hospital after she was admitted for increased anxiety with paranoia.  She states that she is not feeling depressed; but she feels disconnected and easily agitated  She states that she feels overwhelmed, periods of feeling nervous, periods of feeling paranoid. She feels jumpy, worried about things that she can't explain. She is reluctant to take the Abilify because she looked it up and found that it was for psychosis.  Discussed that the Abilify was used for several different reason including depression, mood, agitation as well as psychosis.  She appeared to be more willing to take the medications.  She states that she is having a little more problem with sleep; denies any NM but shares that she was sleeping better several weeks ago.  Denies any problems with her appetite.  Denies any new health issues.  Denies any new stressors, she is currently staying with her mother at this time.  Denies any current AV hallucinations, denies any SI/HI.      Will order the prolixin for her continued anxiety and paranoia.  Seroquel was discontinued in the hospital.  Remeron was increased, maintain the hydroxyzine.      The following portions of the patient's history were reviewed and updated as appropriate: allergies, current medications, past family history, past medical history, past social history, past surgical history and problem list.    Review of Systems   Constitutional: Negative for appetite  "change, chills, diaphoresis, fatigue, fever and unexpected weight change.   HENT: Negative for hearing loss, sore throat, trouble swallowing and voice change.    Eyes: Negative for photophobia and visual disturbance.   Respiratory: Negative for cough, chest tightness and shortness of breath.    Cardiovascular: Negative for chest pain and palpitations.   Gastrointestinal: Negative for abdominal pain, constipation, nausea and vomiting.   Endocrine: Negative for cold intolerance and heat intolerance.   Genitourinary: Negative for dysuria and frequency.   Musculoskeletal: Positive for arthralgias and back pain. Negative for joint swelling and neck stiffness.   Skin: Negative for color change and wound.   Allergic/Immunologic: Negative for environmental allergies and immunocompromised state.   Neurological: Negative for dizziness, tremors, seizures, syncope, weakness, light-headedness and headaches.   Hematological: Negative for adenopathy. Does not bruise/bleed easily.       Objective   Physical Exam   Constitutional: She appears well-developed and well-nourished. No distress.   Neurological: She is alert. Coordination and gait normal.   Vitals reviewed.    Blood pressure 119/73, pulse 81, height 167.6 cm (65.98\"), weight 78 kg (172 lb).    Medication List:   Current Outpatient Medications   Medication Sig Dispense Refill   • ARIPiprazole (ABILIFY) 5 MG tablet Take 1 tablet by mouth Daily. 30 tablet 0   • fluPHENAZine (PROLIXIN) 5 MG tablet Take 1 tablet by mouth Daily. 30 tablet 0   • hydrOXYzine (VISTARIL) 25 MG capsule Take 1 capsule by mouth 3 (Three) Times a Day As Needed for Anxiety. 90 capsule 0   • metoprolol tartrate (LOPRESSOR) 50 MG tablet Take 1 tablet by mouth Every 12 (Twelve) Hours. 60 tablet 0   • mirtazapine (REMERON) 30 MG tablet Take 1 tablet by mouth Every Night for 332 doses. 30 tablet 0   • potassium chloride (K-DUR) 10 MEQ CR tablet Take 1 tablet by mouth Daily. 30 tablet 0     No current " facility-administered medications for this visit.        Mental Status Exam:   Hygiene:   good  Cooperation:  Guarded  Eye Contact:  Fair  Psychomotor Behavior:  Appropriate  Affect:  Appropriate  Hopelessness: Denies  Speech:  Minimal  Thought Process:  Linear  Thought Content:  Mood congurent  Suicidal:  None  Homicidal:  None  Hallucinations:  None  Delusion:  None  Memory:  Intact  Orientation:  Person, Place, Time and Situation  Reliability:  fair  Insight:  Fair  Judgement:  Fair  Impulse Control:  Fair  Physical/Medical Issues:  No     Assessment/Plan   Problems Addressed this Visit     None      Visit Diagnoses     Schizoaffective disorder, unspecified type (CMS/HCC)    -  Primary    Relevant Medications    ARIPiprazole (ABILIFY) 5 MG tablet    mirtazapine (REMERON) 30 MG tablet    fluPHENAZine (PROLIXIN) 5 MG tablet    hydrOXYzine (VISTARIL) 25 MG capsule        Discussed medication options.  Reviewed the risks, benefits, and side effects of the medications; patient acknowledged and verbally consented.  Patient is agreeable to call the [facility].  Patient is aware to call 911 or go to the nearest ER should begin having SI/HI.     Prognosis: Guarded dependent on medication, follow up appointment and treatment plan compliance     Functionality: Fair.Depression seems to be impacting her motivation and energy levels causing the needed for excessive sleep.        Return in 4 weeks

## 2018-12-05 ENCOUNTER — OFFICE VISIT (OUTPATIENT)
Dept: PSYCHIATRY | Facility: CLINIC | Age: 33
End: 2018-12-05

## 2018-12-05 VITALS
DIASTOLIC BLOOD PRESSURE: 82 MMHG | TEMPERATURE: 97.1 F | BODY MASS INDEX: 27.56 KG/M2 | SYSTOLIC BLOOD PRESSURE: 146 MMHG | HEART RATE: 126 BPM | WEIGHT: 171.5 LBS | HEIGHT: 66 IN

## 2018-12-05 DIAGNOSIS — F25.9 SCHIZOAFFECTIVE DISORDER, UNSPECIFIED TYPE (HCC): Primary | ICD-10-CM

## 2018-12-05 PROCEDURE — 99214 OFFICE O/P EST MOD 30 MIN: CPT | Performed by: NURSE PRACTITIONER

## 2018-12-05 RX ORDER — FLUPHENAZINE HYDROCHLORIDE 5 MG/1
5 TABLET ORAL DAILY
Qty: 30 TABLET | Refills: 0 | Status: SHIPPED | OUTPATIENT
Start: 2018-12-05 | End: 2019-01-02 | Stop reason: SDUPTHER

## 2018-12-05 RX ORDER — TOPIRAMATE 25 MG/1
25 TABLET ORAL 2 TIMES DAILY
Qty: 60 TABLET | Refills: 0 | Status: SHIPPED | OUTPATIENT
Start: 2018-12-05 | End: 2019-01-02 | Stop reason: SDUPTHER

## 2018-12-05 RX ORDER — ARIPIPRAZOLE 5 MG/1
5 TABLET ORAL DAILY
Qty: 30 TABLET | Refills: 0 | Status: SHIPPED | OUTPATIENT
Start: 2018-12-05 | End: 2019-01-02 | Stop reason: SDUPTHER

## 2018-12-05 RX ORDER — MIRTAZAPINE 30 MG/1
30 TABLET, FILM COATED ORAL NIGHTLY
Qty: 30 TABLET | Refills: 0 | Status: SHIPPED | OUTPATIENT
Start: 2018-12-05 | End: 2019-01-02 | Stop reason: SDUPTHER

## 2018-12-05 RX ORDER — HYDROXYZINE PAMOATE 25 MG/1
25 CAPSULE ORAL 3 TIMES DAILY PRN
Qty: 90 CAPSULE | Refills: 0 | Status: SHIPPED | OUTPATIENT
Start: 2018-12-05 | End: 2019-01-02 | Stop reason: SDUPTHER

## 2018-12-05 NOTE — PROGRESS NOTES
Subjective   Cintia Issa is a 33 y.o. female is here today for medication management follow-up after she was admitted to the [facility] for psychosis.    Chief Complaint: Follow up with psychosis    History of Present Illness She states that she continue to take the Abilify as prescribed with no SE or problems.  She states that she continues to have anxiety and panic attacks but that seems normal to her.  She states that she has noticed that she as been having dreams but she isn't sure if it is a SE from the medication.  She states that she has been really nervous about going to [employer]- discussed possible going out a different time when not so busy.  She states that she went out with a male friend last night to get a pizza, she did fine until he started talking about all the people in their class that have .  She states that she really doesn't have any depression.  She still has a lot of anxiety.  She has racing thoughts, feeling on edge, nervousness, and she states that when there is a lot of of commotion going on she feels overwhelmed.  She states that she is getting about 8 hours per night with vivid dreams.  She states that her appetite is a little more, previously on Topamax to assist with weight loss but may start it back to assist with anxiety.  Denies any recent illness but had one episode of vomiting since last seen.  Denies any acute stressor.  Denies any AV hallucinations, denies any SI/HI.      The following portions of the patient's history were reviewed and updated as appropriate: allergies, current medications, past family history, past medical history, past social history, past surgical history and problem list.    Review of Systems   Constitutional: Negative for appetite change, chills, diaphoresis, fatigue, fever and unexpected weight change.   HENT: Negative for hearing loss, sore throat, trouble swallowing and voice change.    Eyes: Negative for photophobia and visual  "disturbance.   Respiratory: Negative for cough, chest tightness and shortness of breath.    Cardiovascular: Negative for chest pain and palpitations.   Gastrointestinal: Negative for abdominal pain, constipation, nausea and vomiting.   Endocrine: Negative for cold intolerance and heat intolerance.   Genitourinary: Negative for dysuria and frequency.   Musculoskeletal: Positive for arthralgias and back pain. Negative for joint swelling and neck stiffness.   Skin: Negative for color change and wound.   Allergic/Immunologic: Negative for environmental allergies and immunocompromised state.   Neurological: Negative for dizziness, tremors, seizures, syncope, weakness, light-headedness and headaches.   Hematological: Negative for adenopathy. Does not bruise/bleed easily.       Objective   Physical Exam   Constitutional: She appears well-developed and well-nourished. No distress.   Neurological: She is alert. Coordination and gait normal.   Vitals reviewed.    Blood pressure 146/82, pulse (!) 126, temperature 97.1 °F (36.2 °C), height 167.6 cm (66\"), weight 77.8 kg (171 lb 8 oz).    Medication List:   Current Outpatient Medications   Medication Sig Dispense Refill   • ARIPiprazole (ABILIFY) 5 MG tablet Take 1 tablet by mouth Daily. 30 tablet 0   • ARIPiprazole ER (ABILIFY MAINTENA) 400 MG prefilled syringe IM prefilled syringe Inject 400 mg into the appropriate muscle as directed by prescriber Every 28 (Twenty-Eight) Days. 1 each 5   • fluPHENAZine (PROLIXIN) 5 MG tablet Take 1 tablet by mouth Daily. 30 tablet 0   • hydrOXYzine (VISTARIL) 25 MG capsule Take 1 capsule by mouth 3 (Three) Times a Day As Needed for Anxiety. 90 capsule 0   • metoprolol tartrate (LOPRESSOR) 50 MG tablet Take 1 tablet by mouth Every 12 (Twelve) Hours. 60 tablet 0   • mirtazapine (REMERON) 30 MG tablet Take 1 tablet by mouth Every Night for 332 doses. 30 tablet 0   • potassium chloride (K-DUR) 10 MEQ CR tablet Take 1 tablet by mouth Daily. 30 " tablet 0   • topiramate (TOPAMAX) 25 MG tablet Take 1 tablet by mouth 2 (Two) Times a Day. 60 tablet 0     No current facility-administered medications for this visit.        Mental Status Exam:   Hygiene:   good  Cooperation:  Guarded  Eye Contact:  Fair  Psychomotor Behavior:  Appropriate  Affect:  Appropriate  Hopelessness: Denies  Speech:  Minimal  Thought Process:  Linear  Thought Content:  Mood congurent  Suicidal:  None  Homicidal:  None  Hallucinations:  None  Delusion:  None  Memory:  Intact  Orientation:  Person, Place, Time and Situation  Reliability:  fair  Insight:  Fair  Judgement:  Fair  Impulse Control:  Fair  Physical/Medical Issues:  No     Assessment/Plan   Problems Addressed this Visit     None      Visit Diagnoses     Schizoaffective disorder, unspecified type (CMS/HCC)    -  Primary    Relevant Medications    ARIPiprazole (ABILIFY) 5 MG tablet    fluPHENAZine (PROLIXIN) 5 MG tablet    hydrOXYzine (VISTARIL) 25 MG capsule    mirtazapine (REMERON) 30 MG tablet        Topamax 25mg bid for anxiety and mood    Begin Abilify Main 400mg IM every 28 days for mood.    Discussed medication options.  Reviewed the risks, benefits, and side effects of the medications; patient acknowledged and verbally consented.  Patient is agreeable to call the [facility].  Patient is aware to call 911 or go to the nearest ER should begin having SI/HI.     Prognosis: Guarded dependent on medication, follow up appointment and treatment plan compliance     Functionality: Fair.Depression seems to be impacting her motivation and energy levels causing the needed for excessive sleep.        Return in 4 weeks

## 2018-12-06 ENCOUNTER — PRIOR AUTHORIZATION (OUTPATIENT)
Dept: PSYCHIATRY | Facility: CLINIC | Age: 33
End: 2018-12-06

## 2019-01-02 ENCOUNTER — OFFICE VISIT (OUTPATIENT)
Dept: PSYCHIATRY | Facility: CLINIC | Age: 34
End: 2019-01-02

## 2019-01-02 VITALS
TEMPERATURE: 97.7 F | DIASTOLIC BLOOD PRESSURE: 66 MMHG | BODY MASS INDEX: 28.01 KG/M2 | SYSTOLIC BLOOD PRESSURE: 113 MMHG | HEART RATE: 90 BPM | WEIGHT: 174.3 LBS | HEIGHT: 66 IN

## 2019-01-02 DIAGNOSIS — F25.9 SCHIZOAFFECTIVE DISORDER, UNSPECIFIED TYPE (HCC): Primary | ICD-10-CM

## 2019-01-02 PROCEDURE — 99214 OFFICE O/P EST MOD 30 MIN: CPT | Performed by: NURSE PRACTITIONER

## 2019-01-02 RX ORDER — CYCLOBENZAPRINE HCL 10 MG
10 TABLET ORAL 2 TIMES DAILY PRN
Refills: 0 | Status: ON HOLD | COMMUNITY
Start: 2018-12-27 | End: 2019-03-03

## 2019-01-02 RX ORDER — TOPIRAMATE 25 MG/1
25 TABLET ORAL 2 TIMES DAILY
Qty: 60 TABLET | Refills: 0 | Status: SHIPPED | OUTPATIENT
Start: 2019-01-02 | End: 2019-02-06 | Stop reason: SDUPTHER

## 2019-01-02 RX ORDER — DULOXETIN HYDROCHLORIDE 60 MG/1
60 CAPSULE, DELAYED RELEASE ORAL DAILY
Qty: 30 CAPSULE | Refills: 0 | Status: SHIPPED | OUTPATIENT
Start: 2019-01-02 | End: 2019-02-06 | Stop reason: SDUPTHER

## 2019-01-02 RX ORDER — OXYCODONE AND ACETAMINOPHEN 10; 325 MG/1; MG/1
TABLET ORAL
Refills: 0 | Status: ON HOLD | COMMUNITY
Start: 2018-12-27 | End: 2019-03-03

## 2019-01-02 RX ORDER — GABAPENTIN 300 MG/1
CAPSULE ORAL
Refills: 0 | Status: ON HOLD | COMMUNITY
Start: 2018-12-27 | End: 2019-03-03

## 2019-01-02 RX ORDER — HYDROXYZINE PAMOATE 50 MG/1
50 CAPSULE ORAL 3 TIMES DAILY PRN
Qty: 90 CAPSULE | Refills: 0 | Status: SHIPPED | OUTPATIENT
Start: 2019-01-02 | End: 2019-02-06 | Stop reason: SDUPTHER

## 2019-01-02 RX ORDER — ARIPIPRAZOLE 5 MG/1
5 TABLET ORAL DAILY
Qty: 30 TABLET | Refills: 0 | Status: SHIPPED | OUTPATIENT
Start: 2019-01-02 | End: 2019-02-06 | Stop reason: SDUPTHER

## 2019-01-02 RX ORDER — DULOXETIN HYDROCHLORIDE 30 MG/1
CAPSULE, DELAYED RELEASE ORAL
Refills: 3 | COMMUNITY
Start: 2018-12-10 | End: 2019-01-02 | Stop reason: SDUPTHER

## 2019-01-02 RX ORDER — MIRTAZAPINE 30 MG/1
30 TABLET, FILM COATED ORAL NIGHTLY
Qty: 30 TABLET | Refills: 0 | Status: SHIPPED | OUTPATIENT
Start: 2019-01-02 | End: 2019-02-06 | Stop reason: SDUPTHER

## 2019-01-02 RX ORDER — FLUPHENAZINE HYDROCHLORIDE 5 MG/1
5 TABLET ORAL DAILY
Qty: 30 TABLET | Refills: 0 | Status: SHIPPED | OUTPATIENT
Start: 2019-01-02 | End: 2019-02-06 | Stop reason: SDUPTHER

## 2019-01-02 NOTE — PROGRESS NOTES
Subjective   Cintia Issa is a 33 y.o. female is here today for medication management follow-up, she presents to her appointment on time.     Chief Complaint: Follow up with psychosis    History of Present Illness  She continues to have fear of being by herself, she states that she has been hearing voices of younger boys outside her house.  She states that if she wasn't really worried that she may be able to stay be herself.  She continues to feel stressed out, she worries about things.  She states that she feels like she needs something to help with her panic attacks.  She states that her mother worries if she is taking it.  Patient states that she is taking as prescribed, she denies any SE or problems.  She states that she is having bad dreams from the medications.  She denies any feelings feelings of depression but more anxiety, stress.  She rates her anxiety about 5/10 with 10 being the worse.  She states that she is sleeping good, she is getting at least 8 hours per night.  She states that she recently started taking the topamax.  She states that she is on pain medications for her back.  She states that her appetite is good.  She denies any recent illness.  She denies any recent stressors but shares that she is worried about getting a car.  Denies any AV hallucinations, denies any SI/HI.      Increase the cymbalta for continued anxiety, also increase hydroxyzine as needed for anxiety.  Will attempt have a PA completed for Abilify.     The following portions of the patient's history were reviewed and updated as appropriate: allergies, current medications, past family history, past medical history, past social history, past surgical history and problem list.    Review of Systems   Constitutional: Negative for appetite change, chills, diaphoresis, fatigue, fever and unexpected weight change.   HENT: Negative for hearing loss, sore throat, trouble swallowing and voice change.    Eyes: Negative for  "photophobia and visual disturbance.   Respiratory: Negative for cough, chest tightness and shortness of breath.    Cardiovascular: Negative for chest pain and palpitations.   Gastrointestinal: Negative for abdominal pain, constipation, nausea and vomiting.   Endocrine: Negative for cold intolerance and heat intolerance.   Genitourinary: Negative for dysuria and frequency.   Musculoskeletal: Positive for arthralgias and back pain. Negative for joint swelling and neck stiffness.   Skin: Negative for color change and wound.   Allergic/Immunologic: Negative for environmental allergies and immunocompromised state.   Neurological: Negative for dizziness, tremors, seizures, syncope, weakness, light-headedness and headaches.   Hematological: Negative for adenopathy. Does not bruise/bleed easily.       Objective   Physical Exam   Constitutional: She appears well-developed and well-nourished. No distress.   Neurological: She is alert. Coordination and gait normal.   Vitals reviewed.    Blood pressure 113/66, pulse 90, temperature 97.7 °F (36.5 °C), height 167.6 cm (65.98\"), weight 79.1 kg (174 lb 4.8 oz).    Medication List:   Current Outpatient Medications   Medication Sig Dispense Refill   • ARIPiprazole (ABILIFY) 5 MG tablet Take 1 tablet by mouth Daily. 30 tablet 0   • ARIPiprazole ER (ABILIFY MAINTENA) 400 MG prefilled syringe IM prefilled syringe Inject 400 mg into the appropriate muscle as directed by prescriber Every 28 (Twenty-Eight) Days. 1 each 5   • cyclobenzaprine (FLEXERIL) 10 MG tablet   0   • DULoxetine (CYMBALTA) 60 MG capsule Take 1 capsule by mouth Daily. 30 capsule 0   • fluPHENAZine (PROLIXIN) 5 MG tablet Take 1 tablet by mouth Daily. 30 tablet 0   • gabapentin (NEURONTIN) 300 MG capsule   0   • hydrOXYzine (VISTARIL) 50 MG capsule Take 1 capsule by mouth 3 (Three) Times a Day As Needed for Anxiety. 90 capsule 0   • metoprolol tartrate (LOPRESSOR) 50 MG tablet Take 1 tablet by mouth Every 12 (Twelve) " Hours. 60 tablet 0   • mirtazapine (REMERON) 30 MG tablet Take 1 tablet by mouth Every Night for 332 doses. 30 tablet 0   • oxyCODONE-acetaminophen (PERCOCET)  MG per tablet   0   • potassium chloride (K-DUR) 10 MEQ CR tablet Take 1 tablet by mouth Daily. 30 tablet 0   • topiramate (TOPAMAX) 25 MG tablet Take 1 tablet by mouth 2 (Two) Times a Day. 60 tablet 0     No current facility-administered medications for this visit.        Mental Status Exam:   Hygiene:   good  Cooperation:  Guarded  Eye Contact:  Fair  Psychomotor Behavior:  Appropriate  Affect:  Appropriate  Hopelessness: Denies  Speech:  Minimal  Thought Process:  Linear  Thought Content:  Mood congurent  Suicidal:  None  Homicidal:  None  Hallucinations:  None  Delusion:  None  Memory:  Intact  Orientation:  Person, Place, Time and Situation  Reliability:  fair  Insight:  Fair  Judgement:  Fair  Impulse Control:  Fair  Physical/Medical Issues:  No     Assessment/Plan   Problems Addressed this Visit     None      Visit Diagnoses     Schizoaffective disorder, unspecified type (CMS/HCC)    -  Primary    Relevant Medications    ARIPiprazole (ABILIFY) 5 MG tablet    ARIPiprazole ER (ABILIFY MAINTENA) 400 MG prefilled syringe IM prefilled syringe    DULoxetine (CYMBALTA) 60 MG capsule    fluPHENAZine (PROLIXIN) 5 MG tablet    hydrOXYzine (VISTARIL) 50 MG capsule    mirtazapine (REMERON) 30 MG tablet        Begin Abilify Main 400mg IM every 28 days for mood.    Discussed medication options.  Reviewed the risks, benefits, and side effects of the medications; patient acknowledged and verbally consented.  Patient is agreeable to call the [facility].  Patient is aware to call 911 or go to the nearest ER should begin having SI/HI.     Prognosis: Guarded dependent on medication, follow up appointment and treatment plan compliance     Functionality: Fair.Depression seems to be impacting her motivation and energy levels causing the needed for excessive sleep.         Return in 4 weeks

## 2019-01-07 ENCOUNTER — TELEPHONE (OUTPATIENT)
Dept: FAMILY MEDICINE CLINIC | Facility: CLINIC | Age: 34
End: 2019-01-07

## 2019-01-09 ENCOUNTER — TELEPHONE (OUTPATIENT)
Dept: PSYCHIATRY | Facility: CLINIC | Age: 34
End: 2019-01-09

## 2019-01-09 NOTE — TELEPHONE ENCOUNTER
Spoke with patient in regards of why Insurance is not wanting to cover the Abilify Injection. Patient stated    you need to call the insurance and tell them why she needs the injection because that's the pharmacy told her you needed to do. I stated I have done it 3 times someone else done it once and Mardemiansharon done paper PA and all was denied due to wanting her to try something else. She states she is does not know what Risperdal is and is afraid of SE, I stated I would let Donna know you don't want to try it yet and is not wanting to increase Abilify either at this time.

## 2019-01-09 NOTE — TELEPHONE ENCOUNTER
----- Message from ROBIN Washington sent at 1/9/2019 12:45 PM EST -----  Or we can increase the abilify until she comes and sees me to develop a new plan.

## 2019-01-09 NOTE — TELEPHONE ENCOUNTER
----- Message from ROBIN Washington sent at 1/9/2019  4:28 PM EST -----  It has been approved, please call pharmacy to rerun medications.

## 2019-01-09 NOTE — TELEPHONE ENCOUNTER
See, that is the reason she needs the injection.  Will you send me the number and I will try to talk to somebody about it?

## 2019-02-06 ENCOUNTER — OFFICE VISIT (OUTPATIENT)
Dept: PSYCHIATRY | Facility: CLINIC | Age: 34
End: 2019-02-06

## 2019-02-06 VITALS
BODY MASS INDEX: 28.25 KG/M2 | WEIGHT: 175.8 LBS | TEMPERATURE: 97.4 F | DIASTOLIC BLOOD PRESSURE: 83 MMHG | HEART RATE: 108 BPM | HEIGHT: 66 IN | SYSTOLIC BLOOD PRESSURE: 139 MMHG

## 2019-02-06 DIAGNOSIS — F25.9 SCHIZOAFFECTIVE DISORDER, UNSPECIFIED TYPE (HCC): Primary | ICD-10-CM

## 2019-02-06 PROCEDURE — 99214 OFFICE O/P EST MOD 30 MIN: CPT | Performed by: NURSE PRACTITIONER

## 2019-02-06 RX ORDER — MIRTAZAPINE 30 MG/1
30 TABLET, FILM COATED ORAL NIGHTLY
Qty: 30 TABLET | Refills: 0 | Status: SHIPPED | OUTPATIENT
Start: 2019-02-06 | End: 2019-03-06 | Stop reason: SDUPTHER

## 2019-02-06 RX ORDER — DULOXETIN HYDROCHLORIDE 60 MG/1
60 CAPSULE, DELAYED RELEASE ORAL DAILY
Qty: 30 CAPSULE | Refills: 0 | Status: SHIPPED | OUTPATIENT
Start: 2019-02-06 | End: 2019-03-06 | Stop reason: SDUPTHER

## 2019-02-06 RX ORDER — FLUPHENAZINE HYDROCHLORIDE 5 MG/1
5 TABLET ORAL DAILY
Qty: 30 TABLET | Refills: 0 | Status: SHIPPED | OUTPATIENT
Start: 2019-02-06 | End: 2019-03-06 | Stop reason: SDUPTHER

## 2019-02-06 RX ORDER — TOPIRAMATE 50 MG/1
50 TABLET, FILM COATED ORAL 2 TIMES DAILY
Qty: 60 TABLET | Refills: 0 | Status: SHIPPED | OUTPATIENT
Start: 2019-02-06 | End: 2019-03-06 | Stop reason: SDUPTHER

## 2019-02-06 RX ORDER — HYDROXYZINE PAMOATE 50 MG/1
50 CAPSULE ORAL 3 TIMES DAILY PRN
Qty: 90 CAPSULE | Refills: 0 | Status: SHIPPED | OUTPATIENT
Start: 2019-02-06 | End: 2019-03-06 | Stop reason: SDUPTHER

## 2019-02-06 RX ORDER — ARIPIPRAZOLE 5 MG/1
5 TABLET ORAL DAILY
Qty: 30 TABLET | Refills: 0 | Status: ON HOLD | OUTPATIENT
Start: 2019-02-06 | End: 2019-03-03 | Stop reason: DRUGHIGH

## 2019-02-06 NOTE — PROGRESS NOTES
Subjective   Cintia Issa is a 33 y.o. female is here today for medication management follow-up, she presents to her appointment on time.     Chief Complaint: Follow up with psychosis    History of Present Illness  She states that she is doing better, she is sleeping better.  She states that she has been seeing a gordy and he is staying with her most nights.  She states that her brother is not staying with her anymore.  She is not wanting her boyfriend to move in with her at least until later.  She states that she is doing alright with her medications, she is worried about her weight.  She states that she is not having any depression, rates 1/10.  Rates her anxiety about 4/10 being the worse.  Recommended that she try to stay active and decrease her stress to help with weight decrease.  She states that she is sleeping better, she is getting about 8-10 hours per night.  She has not been sick.  Denies any AV hallucinations, denies any SI/HI.      The following portions of the patient's history were reviewed and updated as appropriate: allergies, current medications, past family history, past medical history, past social history, past surgical history and problem list.    Review of Systems   Constitutional: Negative for appetite change, chills, diaphoresis, fatigue, fever and unexpected weight change.   HENT: Negative for hearing loss, sore throat, trouble swallowing and voice change.    Eyes: Negative for photophobia and visual disturbance.   Respiratory: Negative for cough, chest tightness and shortness of breath.    Cardiovascular: Negative for chest pain and palpitations.   Gastrointestinal: Negative for abdominal pain, constipation, nausea and vomiting.   Endocrine: Negative for cold intolerance and heat intolerance.   Genitourinary: Negative for dysuria and frequency.   Musculoskeletal: Positive for arthralgias and back pain. Negative for joint swelling and neck stiffness.   Skin: Negative for color  "change and wound.   Allergic/Immunologic: Negative for environmental allergies and immunocompromised state.   Neurological: Negative for dizziness, tremors, seizures, syncope, weakness, light-headedness and headaches.   Hematological: Negative for adenopathy. Does not bruise/bleed easily.       Objective   Physical Exam   Constitutional: She appears well-developed and well-nourished. No distress.   Neurological: She is alert. Coordination and gait normal.   Vitals reviewed.    Blood pressure 139/83, pulse 108, temperature 97.4 °F (36.3 °C), height 167.6 cm (65.98\"), weight 79.7 kg (175 lb 12.8 oz).    Medication List:   Current Outpatient Medications   Medication Sig Dispense Refill   • ARIPiprazole (ABILIFY) 5 MG tablet Take 1 tablet by mouth Daily. 30 tablet 0   • ARIPiprazole ER (ABILIFY MAINTENA) 400 MG prefilled syringe IM prefilled syringe Inject 400 mg into the appropriate muscle as directed by prescriber Every 28 (Twenty-Eight) Days. 1 each 5   • cyclobenzaprine (FLEXERIL) 10 MG tablet   0   • DULoxetine (CYMBALTA) 60 MG capsule Take 1 capsule by mouth Daily. 30 capsule 0   • fluPHENAZine (PROLIXIN) 5 MG tablet Take 1 tablet by mouth Daily. 30 tablet 0   • gabapentin (NEURONTIN) 300 MG capsule   0   • hydrOXYzine pamoate (VISTARIL) 50 MG capsule Take 1 capsule by mouth 3 (Three) Times a Day As Needed for Anxiety. 90 capsule 0   • metoprolol tartrate (LOPRESSOR) 50 MG tablet Take 1 tablet by mouth Every 12 (Twelve) Hours. 60 tablet 0   • mirtazapine (REMERON) 30 MG tablet Take 1 tablet by mouth Every Night for 332 doses. 30 tablet 0   • oxyCODONE-acetaminophen (PERCOCET)  MG per tablet   0   • potassium chloride (K-DUR) 10 MEQ CR tablet Take 1 tablet by mouth Daily. 30 tablet 0   • topiramate (TOPAMAX) 50 MG tablet Take 1 tablet by mouth 2 (Two) Times a Day. 60 tablet 0     Current Facility-Administered Medications   Medication Dose Route Frequency Provider Last Rate Last Dose   • ARIPiprazole ER " (ABILIFY MAINTENA) IM injection  mg  400 mg Intramuscular See Admin Instructions Donna Freeman APRN           Mental Status Exam:   Hygiene:   good  Cooperation:  Guarded  Eye Contact:  Fair  Psychomotor Behavior:  Appropriate  Affect:  Appropriate  Hopelessness: Denies  Speech:  Minimal  Thought Process:  Linear  Thought Content:  Mood congurent  Suicidal:  None  Homicidal:  None  Hallucinations:  None  Delusion:  None  Memory:  Intact  Orientation:  Person, Place, Time and Situation  Reliability:  fair  Insight:  Fair  Judgement:  Fair  Impulse Control:  Fair  Physical/Medical Issues:  No     Assessment/Plan   Problems Addressed this Visit     None      Visit Diagnoses     Schizoaffective disorder, unspecified type (CMS/HCC)    -  Primary    Relevant Medications    ARIPiprazole (ABILIFY) 5 MG tablet    ARIPiprazole ER (ABILIFY MAINTENA) 400 MG prefilled syringe IM prefilled syringe    DULoxetine (CYMBALTA) 60 MG capsule    fluPHENAZine (PROLIXIN) 5 MG tablet    hydrOXYzine pamoate (VISTARIL) 50 MG capsule    mirtazapine (REMERON) 30 MG tablet    ARIPiprazole ER (ABILIFY MAINTENA) IM injection  mg        Begin Abilify Main 400mg IM every 28 days for mood.    Discussed medication options.  Reviewed the risks, benefits, and side effects of the medications; patient acknowledged and verbally consented.  Patient is agreeable to call the [facility].  Patient is aware to call 911 or go to the nearest ER should begin having SI/HI.     Prognosis: Guarded dependent on medication, follow up appointment and treatment plan compliance     Functionality: Fair.Depression seems to be impacting her motivation and energy levels causing the needed for excessive sleep.        Return in 4 weeks

## 2019-03-03 ENCOUNTER — HOSPITAL ENCOUNTER (INPATIENT)
Facility: HOSPITAL | Age: 34
LOS: 1 days | Discharge: HOME | End: 2019-03-04
Attending: PSYCHIATRY & NEUROLOGY | Admitting: PSYCHIATRY & NEUROLOGY

## 2019-03-03 ENCOUNTER — HOSPITAL ENCOUNTER (EMERGENCY)
Facility: HOSPITAL | Age: 34
Discharge: STILL ADMITTED OR ADMINISTRATIVE | End: 2019-03-03
Attending: FAMILY MEDICINE

## 2019-03-03 ENCOUNTER — APPOINTMENT (OUTPATIENT)
Dept: GENERAL RADIOLOGY | Facility: HOSPITAL | Age: 34
End: 2019-03-03

## 2019-03-03 VITALS
RESPIRATION RATE: 20 BRPM | HEART RATE: 101 BPM | OXYGEN SATURATION: 97 % | TEMPERATURE: 99.4 F | WEIGHT: 150 LBS | SYSTOLIC BLOOD PRESSURE: 110 MMHG | BODY MASS INDEX: 24.11 KG/M2 | DIASTOLIC BLOOD PRESSURE: 73 MMHG | HEIGHT: 66 IN

## 2019-03-03 DIAGNOSIS — F29 PSYCHOSIS, UNSPECIFIED PSYCHOSIS TYPE (HCC): Primary | ICD-10-CM

## 2019-03-03 DIAGNOSIS — F25.9 SCHIZOAFFECTIVE DISORDER, UNSPECIFIED TYPE (HCC): ICD-10-CM

## 2019-03-03 DIAGNOSIS — D72.829 LEUKOCYTOSIS, UNSPECIFIED TYPE: ICD-10-CM

## 2019-03-03 PROBLEM — F22 PARANOID BEHAVIOR (HCC): Status: ACTIVE | Noted: 2019-03-03

## 2019-03-03 LAB
6-ACETYL MORPHINE: NEGATIVE
ALBUMIN SERPL-MCNC: 4.6 G/DL (ref 3.5–5)
ALBUMIN/GLOB SERPL: 1.6 G/DL (ref 1.5–2.5)
ALP SERPL-CCNC: 67 U/L (ref 35–104)
ALT SERPL W P-5'-P-CCNC: 29 U/L (ref 10–36)
AMPHET+METHAMPHET UR QL: NEGATIVE
ANION GAP SERPL CALCULATED.3IONS-SCNC: 9.5 MMOL/L (ref 3.6–11.2)
AST SERPL-CCNC: 31 U/L (ref 10–30)
B-HCG UR QL: NEGATIVE
BARBITURATES UR QL SCN: NEGATIVE
BASOPHILS # BLD AUTO: 0 10*3/MM3 (ref 0–0.3)
BASOPHILS NFR BLD AUTO: 0 % (ref 0–2)
BENZODIAZ UR QL SCN: NEGATIVE
BILIRUB SERPL-MCNC: 0.3 MG/DL (ref 0.2–1.8)
BILIRUB UR QL STRIP: NEGATIVE
BUN BLD-MCNC: 10 MG/DL (ref 7–21)
BUN/CREAT SERPL: 11.2 (ref 7–25)
BUPRENORPHINE SERPL-MCNC: NEGATIVE NG/ML
CALCIUM SPEC-SCNC: 9.3 MG/DL (ref 7.7–10)
CANNABINOIDS SERPL QL: NEGATIVE
CHLORIDE SERPL-SCNC: 109 MMOL/L (ref 99–112)
CLARITY UR: CLEAR
CO2 SERPL-SCNC: 18.5 MMOL/L (ref 24.3–31.9)
COCAINE UR QL: NEGATIVE
COLOR UR: YELLOW
CREAT BLD-MCNC: 0.89 MG/DL (ref 0.43–1.29)
CRP SERPL-MCNC: 5.07 MG/DL (ref 0–0.99)
DEPRECATED RDW RBC AUTO: 42.5 FL (ref 37–54)
EOSINOPHIL # BLD AUTO: 0 10*3/MM3 (ref 0–0.7)
EOSINOPHIL NFR BLD AUTO: 0 % (ref 0–5)
ERYTHROCYTE [DISTWIDTH] IN BLOOD BY AUTOMATED COUNT: 13 % (ref 11.5–14.5)
ETHANOL BLD-MCNC: <10 MG/DL
ETHANOL UR QL: <0.01 %
GFR SERPL CREATININE-BSD FRML MDRD: 72 ML/MIN/1.73
GLOBULIN UR ELPH-MCNC: 2.8 GM/DL
GLUCOSE BLD-MCNC: 120 MG/DL (ref 70–110)
GLUCOSE UR STRIP-MCNC: NEGATIVE MG/DL
HCT VFR BLD AUTO: 38.2 % (ref 37–47)
HGB BLD-MCNC: 12.9 G/DL (ref 12–16)
HGB UR QL STRIP.AUTO: NEGATIVE
IMM GRANULOCYTES # BLD AUTO: 0.06 10*3/MM3 (ref 0–0.03)
IMM GRANULOCYTES NFR BLD AUTO: 0.3 % (ref 0–0.5)
KETONES UR QL STRIP: NEGATIVE
LEUKOCYTE ESTERASE UR QL STRIP.AUTO: NEGATIVE
LYMPHOCYTES # BLD AUTO: 1.68 10*3/MM3 (ref 1–3)
LYMPHOCYTES NFR BLD AUTO: 8 % (ref 21–51)
MAGNESIUM SERPL-MCNC: 2 MG/DL (ref 1.7–2.6)
MCH RBC QN AUTO: 30.9 PG (ref 27–33)
MCHC RBC AUTO-ENTMCNC: 33.8 G/DL (ref 33–37)
MCV RBC AUTO: 91.4 FL (ref 80–94)
METHADONE UR QL SCN: NEGATIVE
MONOCYTES # BLD AUTO: 0.79 10*3/MM3 (ref 0.1–0.9)
MONOCYTES NFR BLD AUTO: 3.8 % (ref 0–10)
NEUTROPHILS # BLD AUTO: 18.45 10*3/MM3 (ref 1.4–6.5)
NEUTROPHILS NFR BLD AUTO: 87.9 % (ref 30–70)
NITRITE UR QL STRIP: NEGATIVE
OPIATES UR QL: NEGATIVE
OSMOLALITY SERPL CALC.SUM OF ELEC: 274.1 MOSM/KG (ref 273–305)
OXYCODONE UR QL SCN: POSITIVE
PCP UR QL SCN: NEGATIVE
PH UR STRIP.AUTO: 7.5 [PH] (ref 5–8)
PLATELET # BLD AUTO: 375 10*3/MM3 (ref 130–400)
PMV BLD AUTO: 9.6 FL (ref 6–10)
POTASSIUM BLD-SCNC: 3.9 MMOL/L (ref 3.5–5.3)
PROT SERPL-MCNC: 7.4 G/DL (ref 6–8)
PROT UR QL STRIP: NEGATIVE
RBC # BLD AUTO: 4.18 10*6/MM3 (ref 4.2–5.4)
SODIUM BLD-SCNC: 137 MMOL/L (ref 135–153)
SP GR UR STRIP: <=1.005 (ref 1–1.03)
UROBILINOGEN UR QL STRIP: NORMAL
WBC NRBC COR # BLD: 20.98 10*3/MM3 (ref 4.5–12.5)

## 2019-03-03 PROCEDURE — 87040 BLOOD CULTURE FOR BACTERIA: CPT | Performed by: PHYSICIAN ASSISTANT

## 2019-03-03 PROCEDURE — 80307 DRUG TEST PRSMV CHEM ANLYZR: CPT | Performed by: PHYSICIAN ASSISTANT

## 2019-03-03 PROCEDURE — 81003 URINALYSIS AUTO W/O SCOPE: CPT | Performed by: PHYSICIAN ASSISTANT

## 2019-03-03 PROCEDURE — 99284 EMERGENCY DEPT VISIT MOD MDM: CPT

## 2019-03-03 PROCEDURE — 71046 X-RAY EXAM CHEST 2 VIEWS: CPT | Performed by: RADIOLOGY

## 2019-03-03 PROCEDURE — 93005 ELECTROCARDIOGRAM TRACING: CPT | Performed by: PSYCHIATRY & NEUROLOGY

## 2019-03-03 PROCEDURE — 71046 X-RAY EXAM CHEST 2 VIEWS: CPT

## 2019-03-03 PROCEDURE — 93010 ELECTROCARDIOGRAM REPORT: CPT | Performed by: INTERNAL MEDICINE

## 2019-03-03 PROCEDURE — 81025 URINE PREGNANCY TEST: CPT | Performed by: PHYSICIAN ASSISTANT

## 2019-03-03 PROCEDURE — 80053 COMPREHEN METABOLIC PANEL: CPT | Performed by: PHYSICIAN ASSISTANT

## 2019-03-03 PROCEDURE — 83735 ASSAY OF MAGNESIUM: CPT | Performed by: PHYSICIAN ASSISTANT

## 2019-03-03 PROCEDURE — 86140 C-REACTIVE PROTEIN: CPT | Performed by: PHYSICIAN ASSISTANT

## 2019-03-03 PROCEDURE — 85025 COMPLETE CBC W/AUTO DIFF WBC: CPT | Performed by: PHYSICIAN ASSISTANT

## 2019-03-03 RX ORDER — TOPIRAMATE 25 MG/1
50 TABLET ORAL 2 TIMES DAILY
Status: DISCONTINUED | OUTPATIENT
Start: 2019-03-03 | End: 2019-03-04 | Stop reason: HOSPADM

## 2019-03-03 RX ORDER — ECHINACEA PURPUREA EXTRACT 125 MG
2 TABLET ORAL AS NEEDED
Status: DISCONTINUED | OUTPATIENT
Start: 2019-03-03 | End: 2019-03-04 | Stop reason: HOSPADM

## 2019-03-03 RX ORDER — LOPERAMIDE HYDROCHLORIDE 2 MG/1
2 CAPSULE ORAL 4 TIMES DAILY PRN
Status: DISCONTINUED | OUTPATIENT
Start: 2019-03-03 | End: 2019-03-04 | Stop reason: HOSPADM

## 2019-03-03 RX ORDER — METOPROLOL TARTRATE 50 MG/1
50 TABLET, FILM COATED ORAL EVERY 12 HOURS SCHEDULED
Status: DISCONTINUED | OUTPATIENT
Start: 2019-03-03 | End: 2019-03-04 | Stop reason: HOSPADM

## 2019-03-03 RX ORDER — ARIPIPRAZOLE 5 MG/1
5 TABLET ORAL DAILY
COMMUNITY
End: 2019-03-06 | Stop reason: SDUPTHER

## 2019-03-03 RX ORDER — MIRTAZAPINE 15 MG/1
30 TABLET, FILM COATED ORAL NIGHTLY
Status: DISCONTINUED | OUTPATIENT
Start: 2019-03-03 | End: 2019-03-04 | Stop reason: HOSPADM

## 2019-03-03 RX ORDER — IBUPROFEN 600 MG/1
600 TABLET ORAL EVERY 6 HOURS PRN
Status: DISCONTINUED | OUTPATIENT
Start: 2019-03-03 | End: 2019-03-04 | Stop reason: HOSPADM

## 2019-03-03 RX ORDER — NICOTINE 21 MG/24HR
1 PATCH, TRANSDERMAL 24 HOURS TRANSDERMAL EVERY 24 HOURS
Status: DISCONTINUED | OUTPATIENT
Start: 2019-03-03 | End: 2019-03-03

## 2019-03-03 RX ORDER — FLUPHENAZINE HYDROCHLORIDE 5 MG/1
5 TABLET ORAL DAILY
Status: DISCONTINUED | OUTPATIENT
Start: 2019-03-04 | End: 2019-03-04 | Stop reason: HOSPADM

## 2019-03-03 RX ORDER — NICOTINE 21 MG/24HR
1 PATCH, TRANSDERMAL 24 HOURS TRANSDERMAL DAILY
Status: DISCONTINUED | OUTPATIENT
Start: 2019-03-04 | End: 2019-03-04 | Stop reason: HOSPADM

## 2019-03-03 RX ORDER — HYDROXYZINE 50 MG/1
50 TABLET, FILM COATED ORAL EVERY 6 HOURS PRN
Status: DISCONTINUED | OUTPATIENT
Start: 2019-03-03 | End: 2019-03-04 | Stop reason: HOSPADM

## 2019-03-03 RX ORDER — TRAZODONE HYDROCHLORIDE 50 MG/1
50 TABLET ORAL NIGHTLY PRN
Status: DISCONTINUED | OUTPATIENT
Start: 2019-03-03 | End: 2019-03-04 | Stop reason: HOSPADM

## 2019-03-03 RX ORDER — ALUMINA, MAGNESIA, AND SIMETHICONE 2400; 2400; 240 MG/30ML; MG/30ML; MG/30ML
15 SUSPENSION ORAL EVERY 6 HOURS PRN
Status: DISCONTINUED | OUTPATIENT
Start: 2019-03-03 | End: 2019-03-04 | Stop reason: HOSPADM

## 2019-03-03 RX ORDER — HYDROXYZINE 50 MG/1
50 TABLET, FILM COATED ORAL 3 TIMES DAILY PRN
Status: CANCELLED | OUTPATIENT
Start: 2019-03-03

## 2019-03-03 RX ORDER — BENZONATATE 100 MG/1
100 CAPSULE ORAL 3 TIMES DAILY PRN
Status: DISCONTINUED | OUTPATIENT
Start: 2019-03-03 | End: 2019-03-04 | Stop reason: HOSPADM

## 2019-03-03 RX ORDER — FAMOTIDINE 20 MG/1
20 TABLET, FILM COATED ORAL 2 TIMES DAILY PRN
Status: DISCONTINUED | OUTPATIENT
Start: 2019-03-03 | End: 2019-03-04 | Stop reason: HOSPADM

## 2019-03-03 RX ORDER — ONDANSETRON 4 MG/1
4 TABLET, FILM COATED ORAL EVERY 6 HOURS PRN
Status: DISCONTINUED | OUTPATIENT
Start: 2019-03-03 | End: 2019-03-04 | Stop reason: HOSPADM

## 2019-03-03 RX ORDER — BENZTROPINE MESYLATE 1 MG/ML
0.5 INJECTION INTRAMUSCULAR; INTRAVENOUS DAILY PRN
Status: DISCONTINUED | OUTPATIENT
Start: 2019-03-03 | End: 2019-03-04 | Stop reason: HOSPADM

## 2019-03-03 RX ORDER — BENZTROPINE MESYLATE 1 MG/1
1 TABLET ORAL DAILY PRN
Status: DISCONTINUED | OUTPATIENT
Start: 2019-03-03 | End: 2019-03-04 | Stop reason: HOSPADM

## 2019-03-03 RX ORDER — DULOXETIN HYDROCHLORIDE 60 MG/1
60 CAPSULE, DELAYED RELEASE ORAL DAILY
Status: DISCONTINUED | OUTPATIENT
Start: 2019-03-04 | End: 2019-03-04 | Stop reason: HOSPADM

## 2019-03-03 RX ORDER — ARIPIPRAZOLE 10 MG/1
5 TABLET ORAL DAILY
Status: DISCONTINUED | OUTPATIENT
Start: 2019-03-04 | End: 2019-03-04 | Stop reason: HOSPADM

## 2019-03-03 RX ADMIN — METOPROLOL TARTRATE 50 MG: 50 TABLET, FILM COATED ORAL at 22:48

## 2019-03-03 RX ADMIN — MIRTAZAPINE 30 MG: 15 TABLET, FILM COATED ORAL at 22:48

## 2019-03-03 RX ADMIN — TOPIRAMATE 50 MG: 25 TABLET, FILM COATED ORAL at 22:47

## 2019-03-04 VITALS
DIASTOLIC BLOOD PRESSURE: 75 MMHG | HEART RATE: 79 BPM | WEIGHT: 164 LBS | RESPIRATION RATE: 18 BRPM | TEMPERATURE: 98.4 F | BODY MASS INDEX: 26.36 KG/M2 | SYSTOLIC BLOOD PRESSURE: 123 MMHG | HEIGHT: 66 IN | OXYGEN SATURATION: 96 %

## 2019-03-04 PROBLEM — F25.9 SCHIZOAFFECTIVE DISORDER: Status: ACTIVE | Noted: 2017-12-26

## 2019-03-04 PROCEDURE — 99236 HOSP IP/OBS SAME DATE HI 85: CPT | Performed by: PSYCHIATRY & NEUROLOGY

## 2019-03-04 RX ADMIN — NICOTINE 1 PATCH: 21 PATCH TRANSDERMAL at 08:24

## 2019-03-04 RX ADMIN — METOPROLOL TARTRATE 50 MG: 50 TABLET, FILM COATED ORAL at 08:24

## 2019-03-04 RX ADMIN — TOPIRAMATE 50 MG: 25 TABLET, FILM COATED ORAL at 08:24

## 2019-03-04 RX ADMIN — DULOXETINE HYDROCHLORIDE 60 MG: 60 CAPSULE, DELAYED RELEASE ORAL at 08:24

## 2019-03-04 RX ADMIN — FLUPHENAZINE HYDROCHLORIDE 5 MG: 5 TABLET, FILM COATED ORAL at 08:24

## 2019-03-04 RX ADMIN — ARIPIPRAZOLE 5 MG: 10 TABLET ORAL at 08:24

## 2019-03-04 NOTE — PLAN OF CARE
Problem: Patient Care Overview  Goal: Plan of Care Review  Outcome: Ongoing (interventions implemented as appropriate)   03/04/19 0452   Coping/Psychosocial   Plan of Care Reviewed With patient   Coping/Psychosocial   Patient Agreement with Plan of Care agrees   OTHER   Outcome Summary new patient
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
## 2019-03-04 NOTE — DISCHARGE SUMMARY
"PSYCHIATRIC DISCHARGE SUMMARY     Patient Identification:  Name:  Cintia Issa  Age:  33 y.o.  Sex:  female  :  1985  MRN:  [id number]  Visit Number:  [id number]      Date of Admission:3/3/2019   Date of Discharge:  3/4/2019    Discharge Diagnosis:  Active Problems:      Schizoaffective disorder (CMS/HCC)      Essential hypertension      Admission Diagnosis:  Paranoid behavior (CMS/HCC) [F22]     Hospital Course  Patient is a 33 y.o. female with schizoaffective disorder, presented with disorganized and paranoid thoughts. The patient was admitted to the [facility] unit for safety, further evaluation and treatment.  The patient was resumed on her medications.  She admitted that for 2 or 3 weeks she stopped taking her medications because she was feeling better.  She also reported feeling stressed out because her boyfriend moved in around the same time and initially it was fun but he was spending too much time in her house and she felt overwhelmed and needed space to herself.  She agreed that she needed to tell him that she wanted to take things slowly.  She also agreed to continue her medications and keep her outpatient appointment.  The patient was also able to take part in individual and group counseling sessions and work on appropriate coping skills.  The patient made steady improvement in her mood and expressed feeling more positive and hopeful about future. Sleep and appetite were improved.  The day of discharge the patient was calm, cooperative and pleasant. Mood was reported to be good, and denied SI/HI/AVH. Also reported no medication side effects.        Mental Status Exam upon discharge:   Mood \"good\"   Affect-congruent, appropriate, stable  Thought Content-goal directed, no delusional material present  Thought process-linear, organized.  Suicidality: No SI  Homicidality: No HI  Perception: No AH/VH    Procedures Performed         Consults:   Consults     No orders found for last 30 " day(s).          Pertinent Test Results:   Lab Results (last 7 days)     ** No results found for the last 168 hours. **      Glucose 120.  White cell count 20.98, neutrophils 87.9%.  Patient's chest x-ray and urinalysis were normal.  Patient was afebrile.  Urine drug screen was positive for oxycodone.    Condition on Discharge:  improved    Vital Signs  Temp:  [97 °F (36.1 °C)-99.4 °F (37.4 °C)] 98.4 °F (36.9 °C)  Heart Rate:  [] 79  Resp:  [18-20] 18  BP: (103-123)/(59-78) 123/75      Discharge Disposition:  Home or Self Care    Discharge Medications:     Discharge Medications      Continue These Medications      Instructions Start Date   ARIPiprazole 5 MG tablet  Commonly known as:  ABILIFY   5 mg, Oral, Daily      ARIPiprazole  MG Suspension Reconstituted ER IM injection ER  Commonly known as:  ABILIFY MAINTENA   400 mg, Intramuscular, Every 28 Days      DULoxetine 60 MG capsule  Commonly known as:  CYMBALTA   60 mg, Oral, Daily      fluPHENAZine 5 MG tablet  Commonly known as:  PROLIXIN   5 mg, Oral, Daily      hydrOXYzine pamoate 50 MG capsule  Commonly known as:  VISTARIL   50 mg, Oral, 3 Times Daily PRN      metoprolol tartrate 50 MG tablet  Commonly known as:  LOPRESSOR   50 mg, Oral, Every 12 Hours Scheduled      mirtazapine 30 MG tablet  Commonly known as:  REMERON   30 mg, Oral, Nightly      topiramate 50 MG tablet  Commonly known as:  TOPAMAX   50 mg, Oral, 2 Times Daily             Discharge Diet: Regular    Activity at Discharge: As tolerated    Follow-up Appointments  Future Appointments   Date Time Provider Department Center   3/6/2019  3:20 PM Donna Freeman APRN [facility] None         Test Results Pending at Discharge      Clinician:   Mario Lin MD  03/04/19  1:03 PM

## 2019-03-04 NOTE — NURSING NOTE
Presented to ED r/t anxiety and panic attacks.  Reports that she hasn't felt well for the last 3 days.  States that she was afraid to go to sleep until 3 or 4 in the morning, and she made herself stay awake and look around to make sure no one was there.  While in ED she reported thinking another pt was watching her.  Upon admission, she presents with rambling speech and FOI.  States that she missed her last appointment with Rodrigo Chanel and Donna Freeman.  Has not been taking PO psychotropic medications (threw them away), and states that she doesn't remember when she last had her injection.  Reports that she has stable housing, but she does not like being there alone, and has to have someone stay with her due to anxiety.  Was last admitted to this facility 11/13-11/19/2018.

## 2019-03-04 NOTE — PROGRESS NOTES
1610:     Therapist learned that patient was discharged prior to therapist's assessment this date.  Patient is scheduled with the [facility] for follow-up.  It appears that she denied SI/HI and acute symptoms and was discharged by Dr. Lin.

## 2019-03-04 NOTE — ED PROVIDER NOTES
Subjective   33-year-old female presents the ED today for mental health evaluation.  She states she has been having anxiety and panic attacks for the last 3 days.  She states she has felt very stressed out.  She states her boyfriend recently moved in with her and she feels like everything is moving very quickly.  She denies any suicidal or homicidal ideations.  She denies any drug or alcohol use.  She states her appetite and sleep have been poor.  She denies any hallucinations.  She states she has not taken her medicines in several weeks because she felt like she did not need them.  She states her mother brought her in today.        History provided by:  Patient  Mental Health Problem   Presenting symptoms: no hallucinations and no suicidal thoughts    Presenting symptoms comment:  Anxiety  Degree of incapacity (severity):  Moderate  Onset quality:  Gradual  Duration:  3 days  Timing:  Constant  Progression:  Worsening  Chronicity:  New  Context: noncompliance    Context: not alcohol use and not drug abuse    Relieved by:  Nothing  Worsened by:  Nothing  Associated symptoms: anxiety, appetite change and insomnia    Risk factors: hx of mental illness        Review of Systems   Constitutional: Positive for appetite change.   HENT: Negative.    Eyes: Negative.    Respiratory: Negative.    Cardiovascular: Negative.    Gastrointestinal: Negative.    Genitourinary: Negative.    Musculoskeletal: Negative.    Skin: Negative.    Neurological: Negative.    Psychiatric/Behavioral: Positive for sleep disturbance. Negative for hallucinations and suicidal ideas. The patient is nervous/anxious and has insomnia.    All other systems reviewed and are negative.      Past Medical History:   Diagnosis Date   • Anxiety    • Arthritis    • Bronchitis    • Essential hypertension 9/15/2016   • Hypertension    • MDD (major depressive disorder), recurrent, severe, with psychosis (CMS/[facility]) 6/9/2018   • Panic disorder    • Psychiatric illness   "  • Schizoaffective disorder (CMS/HCC)    • Schizophrenia, paranoid (CMS/HCC)        Allergies   Allergen Reactions   • Elavil [Amitriptyline] Nausea Only       Past Surgical History:   Procedure Laterality Date   • WISDOM TOOTH EXTRACTION         Family History   Problem Relation Age of Onset   • Arthritis Mother    • Stroke Father    • COPD Brother    • Diabetes Maternal Grandmother    • COPD Maternal Grandfather    • Heart disease Maternal Grandfather    • Stroke Maternal Grandfather    • ADD / ADHD Neg Hx    • Anxiety disorder Neg Hx    • Bipolar disorder Neg Hx    • Alcohol abuse Neg Hx        Social History     Socioeconomic History   • Marital status: Single     Spouse name: Not on file   • Number of children: Not on file   • Years of education: Not on file   • Highest education level: Not on file   Tobacco Use   • Smoking status: Current Every Day Smoker     Packs/day: 1.00     Types: Cigarettes   • Smokeless tobacco: Never Used   Substance and Sexual Activity   • Alcohol use: No   • Drug use: Yes     Types: Oxycodone     Comment: history unknown. poor historian due to mental status.    • Sexual activity: No     Comment: reports she hasn't been in \"forever.\"            Objective   Physical Exam   Constitutional: She is oriented to person, place, and time. She appears well-developed and well-nourished. No distress.   HENT:   Head: Normocephalic and atraumatic.   Right Ear: External ear normal.   Left Ear: External ear normal.   Nose: Nose normal.   Mouth/Throat: Oropharynx is clear and moist.   Eyes: Conjunctivae and EOM are normal. Pupils are equal, round, and reactive to light.   Neck: Normal range of motion. Neck supple.   Cardiovascular: Regular rhythm, normal heart sounds and intact distal pulses. Tachycardia present.   Pulmonary/Chest: Effort normal and breath sounds normal.   Abdominal: Soft. Bowel sounds are normal.   Musculoskeletal: Normal range of motion.   Neurological: She is alert and oriented " to person, place, and time.   Skin: Skin is warm and dry. Capillary refill takes less than 2 seconds.   Psychiatric: Her speech is normal and behavior is normal. Judgment and thought content normal. Her mood appears anxious. Cognition and memory are normal. She expresses no homicidal and no suicidal ideation.   Nursing note and vitals reviewed.      Procedures           ED Course  ED Course as of Mar 03 1956   Sun Mar 03, 2019   1900 Patient found to have leukocytosis.  She has no complaints of fever or infection type symptoms.  Will get CXR to evaluate.  []   1935 No acute findings on CXR per Dr. Winter   []   1952 Medically clear for psych, no findings of infection.  Patient reports to intake nurse that she is on Cipro for a dental infection.  I examined her mouth, she does have poor dentition but no abscess appreciated.  She has no trismus or gum swelling.  Medically clear for psych.  []      ED Course User Index  [] Elizabeth Ruano PA                  [facility]  Number of Diagnoses or Management Options  Leukocytosis, unspecified type:   Psychosis, unspecified psychosis type (CMS/HCC):      Amount and/or Complexity of Data Reviewed  Clinical lab tests: reviewed  Tests in the radiology section of CPT®: reviewed  Decide to obtain previous medical records or to obtain history from someone other than the patient: yes    Patient Progress  Patient progress: stable        Final diagnoses:   Psychosis, unspecified psychosis type (CMS/HCC)   Leukocytosis, unspecified type            Elizabeth Ruano PA  03/03/19 1956

## 2019-03-06 ENCOUNTER — OFFICE VISIT (OUTPATIENT)
Dept: PSYCHIATRY | Facility: CLINIC | Age: 34
End: 2019-03-06

## 2019-03-06 VITALS
SYSTOLIC BLOOD PRESSURE: 141 MMHG | DIASTOLIC BLOOD PRESSURE: 87 MMHG | HEART RATE: 123 BPM | WEIGHT: 167.5 LBS | BODY MASS INDEX: 27.04 KG/M2

## 2019-03-06 DIAGNOSIS — F25.0 SCHIZOAFFECTIVE DISORDER, BIPOLAR TYPE (HCC): Primary | ICD-10-CM

## 2019-03-06 PROCEDURE — 99214 OFFICE O/P EST MOD 30 MIN: CPT | Performed by: NURSE PRACTITIONER

## 2019-03-06 RX ORDER — FLUPHENAZINE HYDROCHLORIDE 5 MG/1
5 TABLET ORAL DAILY
Qty: 30 TABLET | Refills: 1 | Status: SHIPPED | OUTPATIENT
Start: 2019-03-06 | End: 2019-04-03 | Stop reason: SDUPTHER

## 2019-03-06 RX ORDER — HYDROXYZINE PAMOATE 50 MG/1
50 CAPSULE ORAL 3 TIMES DAILY PRN
Qty: 90 CAPSULE | Refills: 1 | Status: SHIPPED | OUTPATIENT
Start: 2019-03-06 | End: 2019-04-03 | Stop reason: SDUPTHER

## 2019-03-06 RX ORDER — TOPIRAMATE 50 MG/1
50 TABLET, FILM COATED ORAL 2 TIMES DAILY
Qty: 60 TABLET | Refills: 1 | Status: SHIPPED | OUTPATIENT
Start: 2019-03-06 | End: 2019-04-03 | Stop reason: SDUPTHER

## 2019-03-06 RX ORDER — ARIPIPRAZOLE 5 MG/1
5 TABLET ORAL DAILY
Qty: 30 TABLET | Refills: 1 | Status: SHIPPED | OUTPATIENT
Start: 2019-03-06 | End: 2019-04-03 | Stop reason: SDUPTHER

## 2019-03-06 RX ORDER — MIRTAZAPINE 30 MG/1
30 TABLET, FILM COATED ORAL NIGHTLY
Qty: 30 TABLET | Refills: 1 | Status: SHIPPED | OUTPATIENT
Start: 2019-03-06 | End: 2019-04-03 | Stop reason: SDUPTHER

## 2019-03-06 RX ORDER — DULOXETIN HYDROCHLORIDE 60 MG/1
60 CAPSULE, DELAYED RELEASE ORAL DAILY
Qty: 30 CAPSULE | Refills: 1 | Status: SHIPPED | OUTPATIENT
Start: 2019-03-06 | End: 2019-04-03 | Stop reason: SDUPTHER

## 2019-03-06 NOTE — PROGRESS NOTES
Subjective   Cintia Issa is a 33 y.o. female is here today for medication management follow-up, she presents to her appointment on time.  She is accompanied by her mother with whom she gives permission to speak in front of her openly.     Chief Complaint: Follow up with psychosis    History of Present Illness  She states that she has been to the ER twice because she has had panic attacks, she states that they didn't do anything for her in [place] but went to [place] where she spent the night at the [facility] [place].  It was noted that Dr Lin reported that she had stopped her medications for about 4 days and that could have been a reason for her decompensation; also she was started on steroids for bronchitis as well as an antibiotic which could contribute to a return of symptoms.  Discussed with the patient the importance of taking her medications as prescribed, discussed the etiology and chronicity of her diagnosis.  This upset her but made her aware to think of it like DM/heart disease, she felt a little better.  She states that she and her boyfriend are doing well, she likes to have her space at times.  She rates her depression 2/10, anxiety 4/10 with 10 being the worse.  She is sleeping about 7-8 hours per night with no NM, states that she did have a dream about her father last night.  Appetite has been good with stable weight.  Denies any current stressors.  She states that she is planning on getting her teeth removed because they are bothering her tremendously.  Denies any AV hallucinations, denies any SI/HI.      The following portions of the patient's history were reviewed and updated as appropriate: allergies, current medications, past family history, past medical history, past social history, past surgical history and problem list.    Review of Systems   Constitutional: Negative for appetite change, chills, diaphoresis, fatigue, fever and unexpected weight change.   HENT: Negative for hearing  loss, sore throat, trouble swallowing and voice change.    Eyes: Negative for photophobia and visual disturbance.   Respiratory: Negative for cough, chest tightness and shortness of breath.    Cardiovascular: Negative for chest pain and palpitations.   Gastrointestinal: Negative for abdominal pain, constipation, nausea and vomiting.   Endocrine: Negative for cold intolerance and heat intolerance.   Genitourinary: Negative for dysuria and frequency.   Musculoskeletal: Positive for arthralgias and back pain. Negative for joint swelling and neck stiffness.   Skin: Negative for color change and wound.   Allergic/Immunologic: Negative for environmental allergies and immunocompromised state.   Neurological: Negative for dizziness, tremors, seizures, syncope, weakness, light-headedness and headaches.   Hematological: Negative for adenopathy. Does not bruise/bleed easily.       Objective   Physical Exam   Constitutional: She appears well-developed and well-nourished. No distress.   Neurological: She is alert. Coordination and gait normal.   Vitals reviewed.    Blood pressure 141/87, pulse (!) 123, weight 76 kg (167 lb 8 oz), last menstrual period 02/07/2019.    Medication List:   Current Outpatient Medications   Medication Sig Dispense Refill   • ARIPiprazole (ABILIFY) 5 MG tablet Take 1 tablet by mouth Daily. 30 tablet 1   • ARIPiprazole ER (ABILIFY MAINTENA) 400 MG Suspension Reconstituted ER IM injection ER Inject 400 mg into the appropriate muscle as directed by prescriber Every 28 (Twenty-Eight) Days. 1 each 5   • DULoxetine (CYMBALTA) 60 MG capsule Take 1 capsule by mouth Daily. 30 capsule 1   • fluPHENAZine (PROLIXIN) 5 MG tablet Take 1 tablet by mouth Daily. 30 tablet 1   • hydrOXYzine pamoate (VISTARIL) 50 MG capsule Take 1 capsule by mouth 3 (Three) Times a Day As Needed for Anxiety. 90 capsule 1   • metoprolol tartrate (LOPRESSOR) 50 MG tablet Take 1 tablet by mouth Every 12 (Twelve) Hours. 60 tablet 0   •  mirtazapine (REMERON) 30 MG tablet Take 1 tablet by mouth Every Night for 332 doses. 30 tablet 1   • topiramate (TOPAMAX) 50 MG tablet Take 1 tablet by mouth 2 (Two) Times a Day. 60 tablet 1     Current Facility-Administered Medications   Medication Dose Route Frequency Provider Last Rate Last Dose   • ARIPiprazole ER (ABILIFY MAINTENA) IM injection  mg  400 mg Intramuscular Q28 Days Donna Freeman APRN   400 mg at 03/06/19 1610       Mental Status Exam:   Hygiene:   good  Cooperation:  Guarded  Eye Contact:  Fair  Psychomotor Behavior:  Appropriate  Affect:  Appropriate  Hopelessness: Denies  Speech:  Minimal  Thought Process:  Linear  Thought Content:  Mood congurent  Suicidal:  None  Homicidal:  None  Hallucinations:  None  Delusion:  None  Memory:  Intact  Orientation:  Person, Place, Time and Situation  Reliability:  fair  Insight:  Fair  Judgement:  Fair  Impulse Control:  Fair  Physical/Medical Issues:  No     Assessment/Plan   Problems Addressed this Visit        Other    Schizoaffective disorder (CMS/HCC) - Primary    Relevant Medications    ARIPiprazole ER (ABILIFY MAINTENA) IM injection  mg    ARIPiprazole (ABILIFY) 5 MG tablet    ARIPiprazole ER (ABILIFY MAINTENA) 400 MG Suspension Reconstituted ER IM injection ER    DULoxetine (CYMBALTA) 60 MG capsule    fluPHENAZine (PROLIXIN) 5 MG tablet    hydrOXYzine pamoate (VISTARIL) 50 MG capsule    mirtazapine (REMERON) 30 MG tablet        Discussed medication options.  Continue Abilify Maintena with next injection in 28 days.  (April 2, 2019).  Reviewed the risks, benefits, and side effects of the medications; patient acknowledged and verbally consented.  Patient is agreeable to call the [facility].  Patient is aware to call 911 or go to the nearest ER should begin having SI/HI.     Prognosis: Guarded dependent on medication, follow up appointment and treatment plan compliance     Functionality: Fair.Depression seems to be impacting her  motivation and energy levels causing the needed for excessive sleep.        Return in 4 weeks

## 2019-03-08 LAB
BACTERIA SPEC AEROBE CULT: NORMAL
BACTERIA SPEC AEROBE CULT: NORMAL

## 2019-04-03 ENCOUNTER — PRIOR AUTHORIZATION (OUTPATIENT)
Dept: PSYCHIATRY | Facility: CLINIC | Age: 34
End: 2019-04-03

## 2019-04-03 ENCOUNTER — OFFICE VISIT (OUTPATIENT)
Dept: PSYCHIATRY | Facility: CLINIC | Age: 34
End: 2019-04-03

## 2019-04-03 VITALS
WEIGHT: 171.3 LBS | HEIGHT: 66 IN | HEART RATE: 119 BPM | BODY MASS INDEX: 27.53 KG/M2 | DIASTOLIC BLOOD PRESSURE: 76 MMHG | SYSTOLIC BLOOD PRESSURE: 135 MMHG

## 2019-04-03 DIAGNOSIS — F25.0 SCHIZOAFFECTIVE DISORDER, BIPOLAR TYPE (HCC): Primary | ICD-10-CM

## 2019-04-03 PROCEDURE — 96372 THER/PROPH/DIAG INJ SC/IM: CPT | Performed by: NURSE PRACTITIONER

## 2019-04-03 PROCEDURE — 99213 OFFICE O/P EST LOW 20 MIN: CPT | Performed by: NURSE PRACTITIONER

## 2019-04-03 RX ORDER — FLUPHENAZINE HYDROCHLORIDE 5 MG/1
5 TABLET ORAL DAILY
Qty: 30 TABLET | Refills: 1 | Status: SHIPPED | OUTPATIENT
Start: 2019-04-03 | End: 2019-05-15 | Stop reason: SDUPTHER

## 2019-04-03 RX ORDER — ARIPIPRAZOLE 5 MG/1
5 TABLET ORAL DAILY
Qty: 30 TABLET | Refills: 1 | Status: SHIPPED | OUTPATIENT
Start: 2019-04-03 | End: 2019-05-15 | Stop reason: DRUGHIGH

## 2019-04-03 RX ORDER — DULOXETIN HYDROCHLORIDE 60 MG/1
60 CAPSULE, DELAYED RELEASE ORAL DAILY
Qty: 30 CAPSULE | Refills: 1 | Status: SHIPPED | OUTPATIENT
Start: 2019-04-03 | End: 2019-05-15 | Stop reason: SDUPTHER

## 2019-04-03 RX ORDER — TOPIRAMATE 50 MG/1
50 TABLET, FILM COATED ORAL 2 TIMES DAILY
Qty: 60 TABLET | Refills: 1 | Status: SHIPPED | OUTPATIENT
Start: 2019-04-03 | End: 2019-05-15 | Stop reason: SDUPTHER

## 2019-04-03 RX ORDER — MIRTAZAPINE 30 MG/1
30 TABLET, FILM COATED ORAL NIGHTLY
Qty: 30 TABLET | Refills: 1 | Status: SHIPPED | OUTPATIENT
Start: 2019-04-03 | End: 2019-05-15 | Stop reason: SDUPTHER

## 2019-04-03 RX ORDER — HYDROXYZINE PAMOATE 50 MG/1
50 CAPSULE ORAL 3 TIMES DAILY PRN
Qty: 90 CAPSULE | Refills: 1 | Status: SHIPPED | OUTPATIENT
Start: 2019-04-03 | End: 2019-05-15 | Stop reason: SDUPTHER

## 2019-04-03 NOTE — PROGRESS NOTES
Subjective   Cintia Issa is a 33 y.o. female is here today for medication management follow-up, she presents to her appointment on time.  She is accompanied by her mother with whom she gives permission to speak in front of her openly.     Chief Complaint: Follow up with psychosis    History of Present Illness  She states that she is doing alright, she states that she had a panic attack other day which she relates to a lot of stress.  She states that she has been stressed out about her relationship with her significant other, she states that it wasn't that she didn't like him but felt she was spending too much time with him and not going to Buddhist.  She states that she is thinking about the situation.  She states that she is not really feeling depressed, rates 2/10, and anxiety about 4/10 with 10 being the worse.  She states that she is sleep has been decreased this past week, she is getting about 10 hours per night with no NM.  Appetite has been ok with slight gain, recommended that she attempt to exercise the best she can with back pain.  She states that her insurance will only cover 5 medications.  Denies any new health issues but shares that she has been worried about her mother who had pneumonia, flu, CHF, and other health issues.  Denies any SI/HI, denies any AV hallucinations.      The following portions of the patient's history were reviewed and updated as appropriate: allergies, current medications, past family history, past medical history, past social history, past surgical history and problem list.    Review of Systems   Constitutional: Negative for appetite change, chills, diaphoresis, fatigue, fever and unexpected weight change.   HENT: Negative for hearing loss, sore throat, trouble swallowing and voice change.    Eyes: Negative for photophobia and visual disturbance.   Respiratory: Negative for cough, chest tightness and shortness of breath.    Cardiovascular: Negative for chest pain and  "palpitations.   Gastrointestinal: Negative for abdominal pain, constipation, nausea and vomiting.   Endocrine: Negative for cold intolerance and heat intolerance.   Genitourinary: Negative for dysuria and frequency.   Musculoskeletal: Positive for arthralgias and back pain. Negative for joint swelling and neck stiffness.   Skin: Negative for color change and wound.   Allergic/Immunologic: Negative for environmental allergies and immunocompromised state.   Neurological: Negative for dizziness, tremors, seizures, syncope, weakness, light-headedness and headaches.   Hematological: Negative for adenopathy. Does not bruise/bleed easily.       Objective   Physical Exam   Constitutional: She appears well-developed and well-nourished. No distress.   Neurological: She is alert. Coordination and gait normal.   Vitals reviewed.    Blood pressure 135/76, pulse 119, height 167.6 cm (65.98\"), weight 77.7 kg (171 lb 4.8 oz).    Medication List:   Current Outpatient Medications   Medication Sig Dispense Refill   • ARIPiprazole (ABILIFY) 5 MG tablet Take 1 tablet by mouth Daily. 30 tablet 1   • ARIPiprazole ER (ABILIFY MAINTENA) 400 MG Suspension Reconstituted ER IM injection ER Inject 400 mg into the appropriate muscle as directed by prescriber Every 28 (Twenty-Eight) Days. 1 each 5   • DULoxetine (CYMBALTA) 60 MG capsule Take 1 capsule by mouth Daily. 30 capsule 1   • fluPHENAZine (PROLIXIN) 5 MG tablet Take 1 tablet by mouth Daily. 30 tablet 1   • hydrOXYzine pamoate (VISTARIL) 50 MG capsule Take 1 capsule by mouth 3 (Three) Times a Day As Needed for Anxiety. 90 capsule 1   • metoprolol tartrate (LOPRESSOR) 50 MG tablet Take 1 tablet by mouth Every 12 (Twelve) Hours. 60 tablet 0   • mirtazapine (REMERON) 30 MG tablet Take 1 tablet by mouth Every Night for 332 doses. 30 tablet 1   • topiramate (TOPAMAX) 50 MG tablet Take 1 tablet by mouth 2 (Two) Times a Day. 60 tablet 1     Current Facility-Administered Medications   Medication " Dose Route Frequency Provider Last Rate Last Dose   • ARIPiprazole ER (ABILIFY MAINTENA) IM injection  mg  400 mg Intramuscular Q28 Days Donna Freeman APRN   400 mg at 03/06/19 1610   • ARIPiprazole ER (ABILIFY MAINTENA) IM injection  mg  400 mg Intramuscular Q28 Days Donna Freeman APRN           Mental Status Exam:   Hygiene:   good  Cooperation:  Guarded  Eye Contact:  Fair  Psychomotor Behavior:  Appropriate  Affect:  Appropriate  Hopelessness: Denies  Speech:  Minimal  Thought Process:  Linear  Thought Content:  Mood congurent  Suicidal:  None  Homicidal:  None  Hallucinations:  None  Delusion:  None  Memory:  Intact  Orientation:  Person, Place, Time and Situation  Reliability:  fair  Insight:  Fair  Judgement:  Fair  Impulse Control:  Fair  Physical/Medical Issues:  No     Assessment/Plan   Problems Addressed this Visit        Other    Schizoaffective disorder (CMS/HCC) - Primary    Relevant Medications    ARIPiprazole ER (ABILIFY MAINTENA) IM injection  mg (Start on 4/3/2019  4:23 PM)    ARIPiprazole (ABILIFY) 5 MG tablet    DULoxetine (CYMBALTA) 60 MG capsule    fluPHENAZine (PROLIXIN) 5 MG tablet    hydrOXYzine pamoate (VISTARIL) 50 MG capsule    mirtazapine (REMERON) 30 MG tablet        Discussed medication options.  Continue Abilify Maintena with next injection in 28 days.  (May 1, 2019).  Reviewed the risks, benefits, and side effects of the medications; patient acknowledged and verbally consented.  Patient is agreeable to call the [facility].  Patient is aware to call 911 or go to the nearest ER should begin having SI/HI.     Prognosis: Guarded dependent on medication, follow up appointment and treatment plan compliance     Functionality: Fair.Depression seems to be impacting her motivation and energy levels causing the needed for excessive sleep.        Return in 4 weeks

## 2019-04-04 ENCOUNTER — TELEPHONE (OUTPATIENT)
Dept: PSYCHIATRY | Facility: CLINIC | Age: 34
End: 2019-04-04

## 2019-04-04 NOTE — TELEPHONE ENCOUNTER
Called patient and let her know PA for Jenniffertataarti Rutledgea was approved through her insurance.

## 2019-05-15 ENCOUNTER — OFFICE VISIT (OUTPATIENT)
Dept: PSYCHIATRY | Facility: CLINIC | Age: 34
End: 2019-05-15

## 2019-05-15 VITALS
WEIGHT: 173.4 LBS | HEART RATE: 116 BPM | DIASTOLIC BLOOD PRESSURE: 89 MMHG | HEIGHT: 66 IN | BODY MASS INDEX: 27.87 KG/M2 | SYSTOLIC BLOOD PRESSURE: 137 MMHG

## 2019-05-15 DIAGNOSIS — F25.0 SCHIZOAFFECTIVE DISORDER, BIPOLAR TYPE (HCC): Primary | ICD-10-CM

## 2019-05-15 PROCEDURE — 99213 OFFICE O/P EST LOW 20 MIN: CPT | Performed by: NURSE PRACTITIONER

## 2019-05-15 PROCEDURE — 96372 THER/PROPH/DIAG INJ SC/IM: CPT | Performed by: NURSE PRACTITIONER

## 2019-05-15 RX ORDER — DULOXETIN HYDROCHLORIDE 60 MG/1
60 CAPSULE, DELAYED RELEASE ORAL DAILY
Qty: 30 CAPSULE | Refills: 2 | Status: SHIPPED | OUTPATIENT
Start: 2019-05-15 | End: 2019-07-10 | Stop reason: SDUPTHER

## 2019-05-15 RX ORDER — FLUPHENAZINE HYDROCHLORIDE 5 MG/1
5 TABLET ORAL DAILY
Qty: 30 TABLET | Refills: 2 | Status: SHIPPED | OUTPATIENT
Start: 2019-05-15 | End: 2019-07-10 | Stop reason: SDUPTHER

## 2019-05-15 RX ORDER — MIRTAZAPINE 30 MG/1
30 TABLET, FILM COATED ORAL NIGHTLY
Qty: 30 TABLET | Refills: 2 | Status: SHIPPED | OUTPATIENT
Start: 2019-05-15 | End: 2019-07-10 | Stop reason: SDUPTHER

## 2019-05-15 RX ORDER — HYDROXYZINE PAMOATE 50 MG/1
50 CAPSULE ORAL 3 TIMES DAILY PRN
Qty: 90 CAPSULE | Refills: 2 | Status: SHIPPED | OUTPATIENT
Start: 2019-05-15 | End: 2019-07-10 | Stop reason: SDUPTHER

## 2019-05-15 RX ORDER — TOPIRAMATE 100 MG/1
100 TABLET, FILM COATED ORAL 2 TIMES DAILY
Qty: 60 TABLET | Refills: 2 | Status: SHIPPED | OUTPATIENT
Start: 2019-05-15 | End: 2019-09-04 | Stop reason: SDUPTHER

## 2019-05-15 NOTE — PROGRESS NOTES
Subjective   Cintia Issa is a 33 y.o. female is here today for medication management follow-up, she presents to her appointment on time.  She is accompanied by her mother with whom she gives permission to speak in front of her openly.     Chief Complaint: Follow up with psychosis    History of Present Illness She states that she has been doing ok, she shares that she has reconciled with her boyfriend and she just wants him to stay at home.  She states that her boyfriend has been staying with her and now she wants her brother to stay with her.  She states that she has been taking her medications as prescribed.  She states that she has been worried about her weight gain, discussed how we could decrease the Abilify to see if that could be effective in her weight loss with the Maintenia being in her system.  She denies any depressive symptoms, she states that her anxiety has been under control with her boyfriend staying with her but that she is anxious because her brother will only being staying at night.  She states that she has been sleeping ok with no NM.  Appetite has been good, recommended that she eat healthy and try to stay active.  Denies any recent health issues. Denies any AV hallucinations, denies any SI/HI.      The following portions of the patient's history were reviewed and updated as appropriate: allergies, current medications, past family history, past medical history, past social history, past surgical history and problem list.    Review of Systems   Constitutional: Negative for appetite change, chills, diaphoresis, fatigue, fever and unexpected weight change.   HENT: Negative for hearing loss, sore throat, trouble swallowing and voice change.    Eyes: Negative for photophobia and visual disturbance.   Respiratory: Negative for cough, chest tightness and shortness of breath.    Cardiovascular: Negative for chest pain and palpitations.   Gastrointestinal: Negative for abdominal pain,  "constipation, nausea and vomiting.   Endocrine: Negative for cold intolerance and heat intolerance.   Genitourinary: Negative for dysuria and frequency.   Musculoskeletal: Positive for arthralgias and back pain. Negative for joint swelling and neck stiffness.   Skin: Negative for color change and wound.   Allergic/Immunologic: Negative for environmental allergies and immunocompromised state.   Neurological: Negative for dizziness, tremors, seizures, syncope, weakness, light-headedness and headaches.   Hematological: Negative for adenopathy. Does not bruise/bleed easily.       Objective   Physical Exam   Constitutional: She appears well-developed and well-nourished. No distress.   Neurological: She is alert. Coordination and gait normal.   Vitals reviewed.    Blood pressure 137/89, pulse 116, height 167.6 cm (65.98\"), weight 78.7 kg (173 lb 6.4 oz).    Medication List:   Current Outpatient Medications   Medication Sig Dispense Refill   • ARIPiprazole ER (ABILIFY MAINTENA) 400 MG Suspension Reconstituted ER IM injection ER Inject 400 mg into the appropriate muscle as directed by prescriber Every 28 (Twenty-Eight) Days. 1 each 5   • DULoxetine (CYMBALTA) 60 MG capsule Take 1 capsule by mouth Daily. 30 capsule 2   • fluPHENAZine (PROLIXIN) 5 MG tablet Take 1 tablet by mouth Daily. 30 tablet 2   • hydrOXYzine pamoate (VISTARIL) 50 MG capsule Take 1 capsule by mouth 3 (Three) Times a Day As Needed for Anxiety. 90 capsule 2   • metoprolol tartrate (LOPRESSOR) 50 MG tablet Take 1 tablet by mouth Every 12 (Twelve) Hours. 60 tablet 0   • mirtazapine (REMERON) 30 MG tablet Take 1 tablet by mouth Every Night for 332 doses. 30 tablet 2   • topiramate (TOPAMAX) 100 MG tablet Take 1 tablet by mouth 2 (Two) Times a Day. 60 tablet 2     Current Facility-Administered Medications   Medication Dose Route Frequency Provider Last Rate Last Dose   • ARIPiprazole ER (ABILIFY MAINTENA) IM injection  mg  400 mg Intramuscular Q28 Days " Donna Freeman APRN   400 mg at 03/06/19 1610   • ARIPiprazole ER (ABILIFY MAINTENA) IM injection  mg  400 mg Intramuscular Q28 Days Donna Freeman APRN   400 mg at 04/03/19 1630   • ARIPiprazole ER (ABILIFY MAINTENA) IM injection  mg  400 mg Intramuscular Q28 Days Donna Freeman APRN           Mental Status Exam:   Hygiene:   good  Cooperation:  Guarded  Eye Contact:  Fair  Psychomotor Behavior:  Appropriate  Affect:  Appropriate  Hopelessness: Denies  Speech:  Minimal  Thought Process:  Linear  Thought Content:  Mood congurent  Suicidal:  None  Homicidal:  None  Hallucinations:  None  Delusion:  None  Memory:  Intact  Orientation:  Person, Place, Time and Situation  Reliability:  fair  Insight:  Fair  Judgement:  Fair  Impulse Control:  Fair  Physical/Medical Issues:  No     Assessment/Plan   Problems Addressed this Visit        Other    Schizoaffective disorder (CMS/HCC) - Primary    Relevant Medications    DULoxetine (CYMBALTA) 60 MG capsule    fluPHENAZine (PROLIXIN) 5 MG tablet    hydrOXYzine pamoate (VISTARIL) 50 MG capsule    mirtazapine (REMERON) 30 MG tablet    ARIPiprazole ER (ABILIFY MAINTENA) 400 MG Suspension Reconstituted ER IM injection ER    ARIPiprazole ER (ABILIFY MAINTENA) IM injection  mg (Start on 5/15/2019 11:45 AM)        Discussed medication options.  Continue Abilify Maintena with next injection in 28 days (6/12/19)  .Reviewed the risks, benefits, and side effects of the medications; patient acknowledged and verbally consented.  Continue other prescribed medications as prescribed. Patient is agreeable to call the [facility].  Patient is aware to call 911 or go to the nearest ER should begin having SI/HI.     Prognosis: Guarded dependent on medication, follow up appointment and treatment plan compliance     Functionality: Fair.Depression seems to be impacting her motivation and energy levels causing the needed for excessive sleep.        Return  in 8 weeks

## 2019-06-12 ENCOUNTER — CLINICAL SUPPORT (OUTPATIENT)
Dept: FAMILY MEDICINE CLINIC | Facility: CLINIC | Age: 34
End: 2019-06-12

## 2019-06-12 DIAGNOSIS — F25.0 SCHIZOAFFECTIVE DISORDER, BIPOLAR TYPE (HCC): Primary | ICD-10-CM

## 2019-06-12 DIAGNOSIS — F33.9 RECURRENT MAJOR DEPRESSIVE DISORDER, REMISSION STATUS UNSPECIFIED (HCC): ICD-10-CM

## 2019-06-12 PROCEDURE — 96372 THER/PROPH/DIAG INJ SC/IM: CPT | Performed by: NURSE PRACTITIONER

## 2019-07-10 ENCOUNTER — OFFICE VISIT (OUTPATIENT)
Dept: PSYCHIATRY | Facility: CLINIC | Age: 34
End: 2019-07-10

## 2019-07-10 VITALS
BODY MASS INDEX: 28.43 KG/M2 | DIASTOLIC BLOOD PRESSURE: 86 MMHG | HEART RATE: 97 BPM | SYSTOLIC BLOOD PRESSURE: 134 MMHG | HEIGHT: 66 IN | WEIGHT: 176.9 LBS

## 2019-07-10 DIAGNOSIS — F41.1 GENERALIZED ANXIETY DISORDER: ICD-10-CM

## 2019-07-10 DIAGNOSIS — F51.05 INSOMNIA DUE TO MENTAL CONDITION: ICD-10-CM

## 2019-07-10 DIAGNOSIS — F25.0 SCHIZOAFFECTIVE DISORDER, BIPOLAR TYPE (HCC): Primary | ICD-10-CM

## 2019-07-10 PROCEDURE — 96372 THER/PROPH/DIAG INJ SC/IM: CPT | Performed by: NURSE PRACTITIONER

## 2019-07-10 PROCEDURE — 90833 PSYTX W PT W E/M 30 MIN: CPT | Performed by: NURSE PRACTITIONER

## 2019-07-10 PROCEDURE — 99214 OFFICE O/P EST MOD 30 MIN: CPT | Performed by: NURSE PRACTITIONER

## 2019-07-10 RX ORDER — FLUPHENAZINE HYDROCHLORIDE 5 MG/1
5 TABLET ORAL DAILY
Qty: 30 TABLET | Refills: 2 | Status: SHIPPED | OUTPATIENT
Start: 2019-07-10 | End: 2019-09-04 | Stop reason: SDUPTHER

## 2019-07-10 RX ORDER — MIRTAZAPINE 30 MG/1
30 TABLET, FILM COATED ORAL NIGHTLY
Qty: 30 TABLET | Refills: 2 | Status: SHIPPED | OUTPATIENT
Start: 2019-07-10 | End: 2019-09-04 | Stop reason: SDUPTHER

## 2019-07-10 RX ORDER — HYDROXYZINE PAMOATE 50 MG/1
50 CAPSULE ORAL 3 TIMES DAILY PRN
Qty: 90 CAPSULE | Refills: 2 | Status: SHIPPED | OUTPATIENT
Start: 2019-07-10 | End: 2019-09-04 | Stop reason: SDUPTHER

## 2019-07-10 RX ORDER — DULOXETIN HYDROCHLORIDE 60 MG/1
60 CAPSULE, DELAYED RELEASE ORAL DAILY
Qty: 30 CAPSULE | Refills: 2 | Status: SHIPPED | OUTPATIENT
Start: 2019-07-10 | End: 2019-09-04 | Stop reason: SDUPTHER

## 2019-08-07 ENCOUNTER — CLINICAL SUPPORT (OUTPATIENT)
Dept: FAMILY MEDICINE CLINIC | Facility: CLINIC | Age: 34
End: 2019-08-07

## 2019-08-07 DIAGNOSIS — F33.9 RECURRENT MAJOR DEPRESSIVE DISORDER, REMISSION STATUS UNSPECIFIED (HCC): ICD-10-CM

## 2019-09-04 ENCOUNTER — TELEPHONE (OUTPATIENT)
Dept: FAMILY MEDICINE CLINIC | Facility: CLINIC | Age: 34
End: 2019-09-04

## 2019-09-04 ENCOUNTER — OFFICE VISIT (OUTPATIENT)
Dept: PSYCHIATRY | Facility: CLINIC | Age: 34
End: 2019-09-04

## 2019-09-04 VITALS
BODY MASS INDEX: 29.11 KG/M2 | WEIGHT: 181.1 LBS | HEIGHT: 66 IN | OXYGEN SATURATION: 95 % | HEART RATE: 118 BPM | SYSTOLIC BLOOD PRESSURE: 128 MMHG | DIASTOLIC BLOOD PRESSURE: 74 MMHG

## 2019-09-04 DIAGNOSIS — F51.05 INSOMNIA DUE TO MENTAL CONDITION: ICD-10-CM

## 2019-09-04 DIAGNOSIS — F41.1 GENERALIZED ANXIETY DISORDER: ICD-10-CM

## 2019-09-04 DIAGNOSIS — F25.0 SCHIZOAFFECTIVE DISORDER, BIPOLAR TYPE (HCC): Primary | ICD-10-CM

## 2019-09-04 PROCEDURE — 90833 PSYTX W PT W E/M 30 MIN: CPT | Performed by: NURSE PRACTITIONER

## 2019-09-04 PROCEDURE — 99214 OFFICE O/P EST MOD 30 MIN: CPT | Performed by: NURSE PRACTITIONER

## 2019-09-04 RX ORDER — FLUPHENAZINE HYDROCHLORIDE 5 MG/1
5 TABLET ORAL DAILY
Qty: 30 TABLET | Refills: 2 | Status: SHIPPED | OUTPATIENT
Start: 2019-09-04 | End: 2019-12-11 | Stop reason: SDUPTHER

## 2019-09-04 RX ORDER — DULOXETIN HYDROCHLORIDE 60 MG/1
60 CAPSULE, DELAYED RELEASE ORAL DAILY
Qty: 30 CAPSULE | Refills: 2 | Status: SHIPPED | OUTPATIENT
Start: 2019-09-04 | End: 2019-12-11 | Stop reason: SDUPTHER

## 2019-09-04 RX ORDER — HYDROXYZINE PAMOATE 50 MG/1
50 CAPSULE ORAL 3 TIMES DAILY PRN
Qty: 90 CAPSULE | Refills: 2 | Status: SHIPPED | OUTPATIENT
Start: 2019-09-04 | End: 2020-03-25 | Stop reason: SDUPTHER

## 2019-09-04 RX ORDER — MIRTAZAPINE 30 MG/1
30 TABLET, FILM COATED ORAL NIGHTLY
Qty: 30 TABLET | Refills: 2 | Status: SHIPPED | OUTPATIENT
Start: 2019-09-04 | End: 2019-12-11 | Stop reason: SDUPTHER

## 2019-09-04 RX ORDER — QUETIAPINE FUMARATE 50 MG/1
50 TABLET, FILM COATED ORAL NIGHTLY
Qty: 30 TABLET | Refills: 2 | Status: SHIPPED | OUTPATIENT
Start: 2019-09-04 | End: 2019-12-11 | Stop reason: SDUPTHER

## 2019-09-04 RX ORDER — TOPIRAMATE 100 MG/1
100 TABLET, FILM COATED ORAL 2 TIMES DAILY
Qty: 60 TABLET | Refills: 2 | Status: SHIPPED | OUTPATIENT
Start: 2019-09-04 | End: 2020-06-17 | Stop reason: SDUPTHER

## 2019-09-04 NOTE — PROGRESS NOTES
"  Subjective   Cintia Issa is a 34 y.o. female is here today for medication management follow-up, she presents to her appointment on time at [facility].      Chief Complaint: Follow up with psychosis    History of Present Illness She states that she is doing good, she states that she has some trouble sleeping at times, waking up but other then that she is \"alright\".  She states that she is taking her medications as prescribed with no SE.  She states that she has been having bad dreams, she states that she feels like she has stress all the time.  She has been trying to get a car but having some difficulties.  She states that she is not depressed; she rates her anxiety about 3/10 with 10 being the worse.  She states that she is getting about 6-8 hours per night but states that it is interrupted.She states that her appetite has been good, noted to have gained about 5 pounds.  She states that she has started walking but really hasn't been doing much.  She states that she has been doing well with her health.  She states that she is having some financial issues; she states that her male friend has been staying more at her house.  Denies any AV hallucinations, denies any SI/HI.  She states that she is still paranoid but not as bad as it use to be.       The following portions of the patient's history were reviewed and updated as appropriate: allergies, current medications, past family history, past medical history, past social history, past surgical history and problem list.    Review of Systems   Constitutional: Negative for appetite change, chills, diaphoresis, fatigue, fever and unexpected weight change.   HENT: Negative for hearing loss, sore throat, trouble swallowing and voice change.    Eyes: Negative for photophobia and visual disturbance.   Respiratory: Negative for cough, chest tightness and shortness of breath.    Cardiovascular: Negative for chest pain and palpitations. " "  Gastrointestinal: Negative for abdominal pain, constipation, nausea and vomiting.   Endocrine: Negative for cold intolerance and heat intolerance.   Genitourinary: Negative for dysuria and frequency.   Musculoskeletal: Positive for arthralgias and back pain. Negative for joint swelling and neck stiffness.   Skin: Negative for color change and wound.   Allergic/Immunologic: Negative for environmental allergies and immunocompromised state.   Neurological: Negative for dizziness, tremors, seizures, syncope, weakness, light-headedness and headaches.   Hematological: Negative for adenopathy. Does not bruise/bleed easily.       Objective   Physical Exam   Constitutional: She appears well-developed and well-nourished. No distress.   Neurological: She is alert. Coordination and gait normal.   Vitals reviewed.    Blood pressure 128/74, pulse 118, height 167.6 cm (65.98\"), weight 82.1 kg (181 lb 1.6 oz), SpO2 95 %.    Medication List:   Current Outpatient Medications   Medication Sig Dispense Refill   • ARIPiprazole ER (ABILIFY MAINTENA) 400 MG Suspension Reconstituted ER IM injection ER Inject 400 mg into the appropriate muscle as directed by prescriber Every 28 (Twenty-Eight) Days. 1 each 5   • DULoxetine (CYMBALTA) 60 MG capsule Take 1 capsule by mouth Daily. 30 capsule 2   • fluPHENAZine (PROLIXIN) 5 MG tablet Take 1 tablet by mouth Daily. 30 tablet 2   • hydrOXYzine pamoate (VISTARIL) 50 MG capsule Take 1 capsule by mouth 3 (Three) Times a Day As Needed for Anxiety. 90 capsule 2   • metoprolol tartrate (LOPRESSOR) 50 MG tablet Take 1 tablet by mouth Every 12 (Twelve) Hours. 60 tablet 0   • mirtazapine (REMERON) 30 MG tablet Take 1 tablet by mouth Every Night for 332 doses. 30 tablet 2   • QUEtiapine (SEROQUEL) 50 MG tablet Take 1 tablet by mouth Every Night. 30 tablet 2   • topiramate (TOPAMAX) 100 MG tablet Take 1 tablet by mouth 2 (Two) Times a Day. 60 tablet 2     Current Facility-Administered Medications "   Medication Dose Route Frequency Provider Last Rate Last Dose   • ARIPiprazole ER (ABILIFY MAINTENA) IM injection  mg  400 mg Intramuscular Q28 Days Pete, Donna Carolyn, APRN   400 mg at 03/06/19 1610   • ARIPiprazole ER (ABILIFY MAINTENA) IM injection  mg  400 mg Intramuscular Q28 Days Pete, Donna Carolyn, APRN   400 mg at 04/03/19 1630   • ARIPiprazole ER (ABILIFY MAINTENA) IM injection  mg  400 mg Intramuscular Q28 Days Pete, Donna Carolyn, APRN   400 mg at 05/15/19 1155   • ARIPiprazole ER (ABILIFY MAINTENA) IM injection  mg  400 mg Intramuscular Q28 Days Pete, Donna Carolyn, APRN   400 mg at 08/07/19 1104       Mental Status Exam:   Hygiene:   good  Cooperation:  Guarded  Eye Contact:  Fair  Psychomotor Behavior:  Appropriate  Affect:  Appropriate  Hopelessness: Denies  Speech:  Minimal  Thought Process:  Linear  Thought Content:  Mood congurent  Suicidal:  None  Homicidal:  None  Hallucinations:  None  Delusion:  None  Memory:  Intact  Orientation:  Person, Place, Time and Situation  Reliability:  fair  Insight:  Fair  Judgement:  Fair  Impulse Control:  Fair  Physical/Medical Issues:  No     Assessment/Plan   Problems Addressed this Visit        Other    Schizoaffective disorder (CMS/HCC) - Primary    Relevant Medications    ARIPiprazole ER (ABILIFY MAINTENA) 400 MG Suspension Reconstituted ER IM injection ER    DULoxetine (CYMBALTA) 60 MG capsule    fluPHENAZine (PROLIXIN) 5 MG tablet    hydrOXYzine pamoate (VISTARIL) 50 MG capsule    mirtazapine (REMERON) 30 MG tablet    QUEtiapine (SEROQUEL) 50 MG tablet      Other Visit Diagnoses     Generalized anxiety disorder        Relevant Medications    ARIPiprazole ER (ABILIFY MAINTENA) 400 MG Suspension Reconstituted ER IM injection ER    DULoxetine (CYMBALTA) 60 MG capsule    fluPHENAZine (PROLIXIN) 5 MG tablet    hydrOXYzine pamoate (VISTARIL) 50 MG capsule    mirtazapine (REMERON) 30 MG tablet    QUEtiapine (SEROQUEL) 50 MG  tablet    Insomnia due to mental condition        Relevant Medications    ARIPiprazole ER (ABILIFY MAINTENA) 400 MG Suspension Reconstituted ER IM injection ER    DULoxetine (CYMBALTA) 60 MG capsule    fluPHENAZine (PROLIXIN) 5 MG tablet    hydrOXYzine pamoate (VISTARIL) 50 MG capsule    mirtazapine (REMERON) 30 MG tablet    QUEtiapine (SEROQUEL) 50 MG tablet        Discussed medication options.  Continue Abilify Maintena with next injection in 28 days.   Add seroquel to assist with sleep and mood. Reviewed the risks, benefits, and side effects of the medications; patient acknowledged and verbally consented.  Continue other prescribed medications as prescribed. Patient is agreeable to call the [facility].  Patient is aware to call 911 or go to the nearest ER should begin having SI/HI.     Prognosis: Guarded dependent on medication, follow up appointment and treatment plan compliance     Functionality: Fair.Depression seems to be impacting her motivation and energy levels causing the needed for excessive sleep.     In 1030am Out 1100 am of which 20 min spent for supportive psychotherapy discussing coordination of care, and counseling the patient regarding   Answered any questions patient had with medication and plan. Discussed her relationship with her significant other and the importance of open communication and doing things together.     Return in 12 weeks

## 2019-12-11 ENCOUNTER — OFFICE VISIT (OUTPATIENT)
Dept: PSYCHIATRY | Facility: CLINIC | Age: 34
End: 2019-12-11

## 2019-12-11 ENCOUNTER — CLINICAL SUPPORT (OUTPATIENT)
Dept: FAMILY MEDICINE CLINIC | Facility: CLINIC | Age: 34
End: 2019-12-11

## 2019-12-11 VITALS
HEART RATE: 113 BPM | BODY MASS INDEX: 27.59 KG/M2 | SYSTOLIC BLOOD PRESSURE: 139 MMHG | WEIGHT: 171.7 LBS | HEIGHT: 66 IN | DIASTOLIC BLOOD PRESSURE: 90 MMHG

## 2019-12-11 DIAGNOSIS — F51.05 INSOMNIA DUE TO MENTAL CONDITION: ICD-10-CM

## 2019-12-11 DIAGNOSIS — F41.1 GENERALIZED ANXIETY DISORDER: Primary | ICD-10-CM

## 2019-12-11 DIAGNOSIS — F25.9 SCHIZOAFFECTIVE DISORDER, UNSPECIFIED TYPE (HCC): ICD-10-CM

## 2019-12-11 PROCEDURE — 99214 OFFICE O/P EST MOD 30 MIN: CPT | Performed by: NURSE PRACTITIONER

## 2019-12-11 PROCEDURE — 90833 PSYTX W PT W E/M 30 MIN: CPT | Performed by: NURSE PRACTITIONER

## 2019-12-11 RX ORDER — MIRTAZAPINE 30 MG/1
30 TABLET, FILM COATED ORAL NIGHTLY
Qty: 30 TABLET | Refills: 2 | Status: SHIPPED | OUTPATIENT
Start: 2019-12-11 | End: 2020-03-25 | Stop reason: SDUPTHER

## 2019-12-11 RX ORDER — FLUPHENAZINE HYDROCHLORIDE 5 MG/1
5 TABLET ORAL DAILY
Qty: 30 TABLET | Refills: 2 | Status: SHIPPED | OUTPATIENT
Start: 2019-12-11 | End: 2020-03-25 | Stop reason: SDUPTHER

## 2019-12-11 RX ORDER — QUETIAPINE FUMARATE 50 MG/1
50 TABLET, FILM COATED ORAL NIGHTLY
Qty: 30 TABLET | Refills: 2 | Status: SHIPPED | OUTPATIENT
Start: 2019-12-11 | End: 2020-03-25 | Stop reason: SDUPTHER

## 2019-12-11 RX ORDER — DULOXETIN HYDROCHLORIDE 60 MG/1
60 CAPSULE, DELAYED RELEASE ORAL DAILY
Qty: 30 CAPSULE | Refills: 2 | Status: SHIPPED | OUTPATIENT
Start: 2019-12-11 | End: 2020-03-25 | Stop reason: SDUPTHER

## 2019-12-11 NOTE — PROGRESS NOTES
Subjective   Cintia Issa is a 34 y.o. female is here today for medication management follow-up, she presents to her appointment on time at [facility].      Chief Complaint: Follow up with psychosis    History of Present Illness The patient states that she is fine today. Denies any physical illness. The patient states that she is taking the medication as she is supposed to without any side effect. The patient rates her anxiety as 2 out 10 with 10 being the worst. Denies any depression. States that she can sleep 6 - 8 hours per night and wakes up during the sleep. Admits occasional NM. States that she has good appetite and 10 lbs weight loss since last visit.  Denies any new stressor. Denies any AV hallucination. Denies any SI/HI.  States that she has a friend staying with her at house, discussed her breakup with her previous boyfriend.      The following portions of the patient's history were reviewed and updated as appropriate: allergies, current medications, past family history, past medical history, past social history, past surgical history and problem list.    Review of Systems   Constitutional: Negative for appetite change, chills, diaphoresis, fatigue, fever and unexpected weight change.   HENT: Negative for hearing loss, sore throat, trouble swallowing and voice change.    Eyes: Negative for photophobia and visual disturbance.   Respiratory: Negative for cough, chest tightness and shortness of breath.    Cardiovascular: Negative for chest pain and palpitations.   Gastrointestinal: Negative for abdominal pain, constipation, nausea and vomiting.   Endocrine: Negative for cold intolerance and heat intolerance.   Genitourinary: Negative for dysuria and frequency.   Musculoskeletal: Positive for arthralgias and back pain. Negative for joint swelling and neck stiffness.   Skin: Negative for color change and wound.   Allergic/Immunologic: Negative for environmental allergies and  "immunocompromised state.   Neurological: Negative for dizziness, tremors, seizures, syncope, weakness, light-headedness and headaches.   Hematological: Negative for adenopathy. Does not bruise/bleed easily.       Objective   Physical Exam   Constitutional: She appears well-developed and well-nourished. No distress.   Neurological: She is alert. Coordination and gait normal.   Vitals reviewed.    Blood pressure 139/90, pulse 113, height 167.6 cm (65.98\"), weight 77.9 kg (171 lb 11.2 oz).    Medication List:   Current Outpatient Medications   Medication Sig Dispense Refill   • ARIPiprazole ER (ABILIFY MAINTENA) 400 MG Suspension Reconstituted ER IM injection ER Inject 400 mg into the appropriate muscle as directed by prescriber Every 28 (Twenty-Eight) Days. Indications: Schizoaffective 1 each 5   • DULoxetine (CYMBALTA) 60 MG capsule Take 1 capsule by mouth Daily. Indications: Schizoaffective disorder 30 capsule 2   • fluPHENAZine (PROLIXIN) 5 MG tablet Take 1 tablet by mouth Daily. Indications: Psychosis 30 tablet 2   • hydrOXYzine pamoate (VISTARIL) 50 MG capsule Take 1 capsule by mouth 3 (Three) Times a Day As Needed for Anxiety. 90 capsule 2   • metoprolol tartrate (LOPRESSOR) 50 MG tablet Take 1 tablet by mouth Every 12 (Twelve) Hours. 60 tablet 0   • mirtazapine (REMERON) 30 MG tablet Take 1 tablet by mouth Every Night for 332 doses. Indications: Major Depressive Disorder 30 tablet 2   • QUEtiapine (SEROQUEL) 50 MG tablet Take 1 tablet by mouth Every Night. 30 tablet 2   • topiramate (TOPAMAX) 100 MG tablet Take 1 tablet by mouth 2 (Two) Times a Day. 60 tablet 2     Current Facility-Administered Medications   Medication Dose Route Frequency Provider Last Rate Last Dose   • ARIPiprazole ER (ABILIFY MAINTENA) IM injection  mg  400 mg Intramuscular Q28 Days Donna Freeman APRN   400 mg at 03/06/19 1610   • ARIPiprazole ER (ABILIFY MAINTENA) IM injection  mg  400 mg Intramuscular Q28 Days Pete" Donna Leon, APRN   400 mg at 04/03/19 1630   • ARIPiprazole ER (ABILIFY MAINTENA) IM injection  mg  400 mg Intramuscular Q28 Days Donna Freeman, APRN   400 mg at 05/15/19 1155   • ARIPiprazole ER (ABILIFY MAINTENA) IM injection  mg  400 mg Intramuscular Q28 Days PeteDonna steel, APRN   400 mg at 12/11/19 1119       Mental Status Exam:   Hygiene:   good  Cooperation:  Guarded  Eye Contact:  Fair  Psychomotor Behavior:  Appropriate  Affect:  Appropriate  Hopelessness: Denies  Speech:  Minimal  Thought Process:  Linear  Thought Content:  Mood congurent  Suicidal:  None  Homicidal:  None  Hallucinations:  None  Delusion:  None  Memory:  Intact  Orientation:  Person, Place, Time and Situation  Reliability:  fair  Insight:  Fair  Judgement:  Fair  Impulse Control:  Fair  Physical/Medical Issues:  No     Assessment/Plan   Problems Addressed this Visit        Other    Schizoaffective disorder (CMS/HCC)    Relevant Medications    ARIPiprazole ER (ABILIFY MAINTENA) 400 MG Suspension Reconstituted ER IM injection ER    DULoxetine (CYMBALTA) 60 MG capsule    fluPHENAZine (PROLIXIN) 5 MG tablet    mirtazapine (REMERON) 30 MG tablet    QUEtiapine (SEROQUEL) 50 MG tablet      Other Visit Diagnoses     Generalized anxiety disorder    -  Primary    Relevant Medications    ARIPiprazole ER (ABILIFY MAINTENA) 400 MG Suspension Reconstituted ER IM injection ER    DULoxetine (CYMBALTA) 60 MG capsule    fluPHENAZine (PROLIXIN) 5 MG tablet    mirtazapine (REMERON) 30 MG tablet    QUEtiapine (SEROQUEL) 50 MG tablet    Insomnia due to mental condition        Relevant Medications    ARIPiprazole ER (ABILIFY MAINTENA) 400 MG Suspension Reconstituted ER IM injection ER    DULoxetine (CYMBALTA) 60 MG capsule    fluPHENAZine (PROLIXIN) 5 MG tablet    mirtazapine (REMERON) 30 MG tablet    QUEtiapine (SEROQUEL) 50 MG tablet        Discussed medication options.  Continue Abilify Maintena with next injection in 28 days.    Add seroquel to assist with sleep and mood. Reviewed the risks, benefits, and side effects of the medications; patient acknowledged and verbally consented.  Continue other prescribed medications as prescribed. Patient is agreeable to call the [facility].  Patient is aware to call 911 or go to the nearest ER should begin having SI/HI.     Prognosis: Guarded dependent on medication, follow up appointment and treatment plan compliance     Functionality: Fair.Depression seems to be impacting her motivation and energy levels causing the needed for excessive sleep.     In 1120 am Out 1140 am of which 20 min spent for supportive psychotherapy discussing coordination of care, and counseling the patient regarding   Answered any questions patient had with medication and plan. Discussed her relationship with her significant other and the importance of open communication and doing things together.     Return in 12 weeks

## 2020-02-05 ENCOUNTER — CLINICAL SUPPORT (OUTPATIENT)
Dept: FAMILY MEDICINE CLINIC | Facility: CLINIC | Age: 35
End: 2020-02-05

## 2020-02-05 DIAGNOSIS — F33.9 RECURRENT MAJOR DEPRESSIVE DISORDER, REMISSION STATUS UNSPECIFIED (HCC): ICD-10-CM

## 2020-02-05 PROCEDURE — 96372 THER/PROPH/DIAG INJ SC/IM: CPT | Performed by: NURSE PRACTITIONER

## 2020-03-25 ENCOUNTER — OFFICE VISIT (OUTPATIENT)
Dept: PSYCHIATRY | Facility: CLINIC | Age: 35
End: 2020-03-25

## 2020-03-25 VITALS
WEIGHT: 175.6 LBS | BODY MASS INDEX: 28.22 KG/M2 | OXYGEN SATURATION: 98 % | SYSTOLIC BLOOD PRESSURE: 142 MMHG | HEIGHT: 66 IN | HEART RATE: 105 BPM | DIASTOLIC BLOOD PRESSURE: 80 MMHG

## 2020-03-25 DIAGNOSIS — F41.1 GENERALIZED ANXIETY DISORDER: Primary | ICD-10-CM

## 2020-03-25 DIAGNOSIS — F51.05 INSOMNIA DUE TO MENTAL CONDITION: ICD-10-CM

## 2020-03-25 DIAGNOSIS — F25.9 SCHIZOAFFECTIVE DISORDER, UNSPECIFIED TYPE (HCC): ICD-10-CM

## 2020-03-25 PROCEDURE — 99214 OFFICE O/P EST MOD 30 MIN: CPT | Performed by: NURSE PRACTITIONER

## 2020-03-25 PROCEDURE — 96372 THER/PROPH/DIAG INJ SC/IM: CPT | Performed by: NURSE PRACTITIONER

## 2020-03-25 RX ORDER — DULOXETIN HYDROCHLORIDE 60 MG/1
60 CAPSULE, DELAYED RELEASE ORAL DAILY
Qty: 30 CAPSULE | Refills: 2 | Status: SHIPPED | OUTPATIENT
Start: 2020-03-25 | End: 2020-06-17 | Stop reason: SDUPTHER

## 2020-03-25 RX ORDER — FLUPHENAZINE HYDROCHLORIDE 5 MG/1
5 TABLET ORAL DAILY
Qty: 30 TABLET | Refills: 2 | Status: SHIPPED | OUTPATIENT
Start: 2020-03-25 | End: 2020-06-17 | Stop reason: SDUPTHER

## 2020-03-25 RX ORDER — MIRTAZAPINE 30 MG/1
30 TABLET, FILM COATED ORAL NIGHTLY
Qty: 30 TABLET | Refills: 2 | Status: SHIPPED | OUTPATIENT
Start: 2020-03-25 | End: 2020-06-17 | Stop reason: SDUPTHER

## 2020-03-25 RX ORDER — HYDROXYZINE PAMOATE 50 MG/1
50 CAPSULE ORAL 3 TIMES DAILY PRN
Qty: 90 CAPSULE | Refills: 2 | Status: SHIPPED | OUTPATIENT
Start: 2020-03-25 | End: 2020-06-17 | Stop reason: SDUPTHER

## 2020-03-25 RX ORDER — QUETIAPINE FUMARATE 50 MG/1
50 TABLET, FILM COATED ORAL NIGHTLY
Qty: 30 TABLET | Refills: 2 | Status: SHIPPED | OUTPATIENT
Start: 2020-03-25 | End: 2020-06-17 | Stop reason: SDUPTHER

## 2020-03-25 NOTE — PROGRESS NOTES
Subjective   Cinita Issa is a 34 y.o. female is here today for medication management follow-up, she presents to her appointment on time at [facility].      Chief Complaint: Follow up with psychosis    History of Present Illness She states that she is doing fine today.  She states that she is taking her medications as prescribed with no SE or problems.  She states that she had a panic attack about 2-3 weeks ago after being out in public around other people after going to the dentist in [place].  She states that this was the only incident.  She denies any symptoms of depression but rates her anxiety about 3/10 with 10 being the worse.  She denies any symptoms of psychosis.  She states that she is sleeping at least 6 hours per night, denies any NM but states that she has had more dreams lately mostly about her father.  She states that her appetite has been good with no significant weight changes.  She states that she feels stressed out about the Corona virus.  She denies any recent health issues.  Denies any AV hallucinations, denies any SI/HI.     The following portions of the patient's history were reviewed and updated as appropriate: allergies, current medications, past family history, past medical history, past social history, past surgical history and problem list.    Review of Systems   Constitutional: Negative for appetite change, chills, diaphoresis, fatigue, fever and unexpected weight change.   HENT: Negative for hearing loss, sore throat, trouble swallowing and voice change.    Eyes: Negative for photophobia and visual disturbance.   Respiratory: Negative for cough, chest tightness and shortness of breath.    Cardiovascular: Negative for chest pain and palpitations.   Gastrointestinal: Negative for abdominal pain, constipation, nausea and vomiting.   Endocrine: Negative for cold intolerance and heat intolerance.   Genitourinary: Negative for dysuria and frequency.  "  Musculoskeletal: Positive for arthralgias and back pain. Negative for joint swelling and neck stiffness.   Skin: Negative for color change and wound.   Allergic/Immunologic: Negative for environmental allergies and immunocompromised state.   Neurological: Negative for dizziness, tremors, seizures, syncope, weakness, light-headedness and headaches.   Hematological: Negative for adenopathy. Does not bruise/bleed easily.       Objective   Physical Exam   Constitutional: She appears well-developed and well-nourished. No distress.   Neurological: She is alert. Coordination and gait normal.   Vitals reviewed.    Blood pressure 142/80, pulse 105, height 167.6 cm (65.98\"), weight 79.7 kg (175 lb 9.6 oz), SpO2 98 %. Body mass index is 28.36 kg/m².    Medication List:   Current Outpatient Medications   Medication Sig Dispense Refill   • ARIPiprazole ER (ABILIFY MAINTENA) 400 MG Suspension Reconstituted ER IM injection ER Inject 400 mg into the appropriate muscle as directed by prescriber Every 28 (Twenty-Eight) Days. Indications: Schizoaffective 1 each 5   • DULoxetine (CYMBALTA) 60 MG capsule Take 1 capsule by mouth Daily. Indications: Schizoaffective disorder 30 capsule 2   • fluPHENAZine (PROLIXIN) 5 MG tablet Take 1 tablet by mouth Daily. Indications: Psychosis 30 tablet 2   • hydrOXYzine pamoate (VISTARIL) 50 MG capsule Take 1 capsule by mouth 3 (Three) Times a Day As Needed for Anxiety. Indications: Feeling Anxious 90 capsule 2   • metoprolol tartrate (LOPRESSOR) 50 MG tablet Take 1 tablet by mouth Every 12 (Twelve) Hours. 60 tablet 0   • mirtazapine (REMERON) 30 MG tablet Take 1 tablet by mouth Every Night for 332 doses. Indications: Major Depressive Disorder 30 tablet 2   • QUEtiapine (SEROquel) 50 MG tablet Take 1 tablet by mouth Every Night. 30 tablet 2   • topiramate (TOPAMAX) 100 MG tablet Take 1 tablet by mouth 2 (Two) Times a Day. 60 tablet 2     Current Facility-Administered Medications   Medication Dose " Route Frequency Provider Last Rate Last Dose   • ARIPiprazole ER (ABILIFY MAINTENA) IM injection  mg  400 mg Intramuscular Q28 Days Pete, Donna Vickyce, APRN   400 mg at 03/06/19 1610   • ARIPiprazole ER (ABILIFY MAINTENA) IM injection  mg  400 mg Intramuscular Q28 Days Pete, Donna Carolyn, APRN   400 mg at 02/05/20 1048   • ARIPiprazole ER (ABILIFY MAINTENA) IM injection  mg  400 mg Intramuscular Q28 Days Pete, Donna Carolyn, APRN   400 mg at 05/15/19 1155       Mental Status Exam:   Hygiene:   good  Cooperation:  Guarded  Eye Contact:  Fair  Psychomotor Behavior:  Appropriate  Affect:  Appropriate  Hopelessness: Denies  Speech:  Minimal  Thought Process:  Linear  Thought Content:  Mood congurent  Suicidal:  None  Homicidal:  None  Hallucinations:  None  Delusion:  None  Memory:  Intact  Orientation:  Person, Place, Time and Situation  Reliability:  fair  Insight:  Fair  Judgement:  Fair  Impulse Control:  Fair  Physical/Medical Issues:  No     Assessment/Plan   Problems Addressed this Visit        Other    Schizoaffective disorder (CMS/HCC)    Relevant Medications    DULoxetine (CYMBALTA) 60 MG capsule    fluPHENAZine (PROLIXIN) 5 MG tablet    hydrOXYzine pamoate (VISTARIL) 50 MG capsule    mirtazapine (REMERON) 30 MG tablet    QUEtiapine (SEROquel) 50 MG tablet    ARIPiprazole ER (ABILIFY MAINTENA) 400 MG Suspension Reconstituted ER IM injection ER      Other Visit Diagnoses     Generalized anxiety disorder    -  Primary    Relevant Medications    DULoxetine (CYMBALTA) 60 MG capsule    fluPHENAZine (PROLIXIN) 5 MG tablet    hydrOXYzine pamoate (VISTARIL) 50 MG capsule    mirtazapine (REMERON) 30 MG tablet    QUEtiapine (SEROquel) 50 MG tablet    ARIPiprazole ER (ABILIFY MAINTENA) 400 MG Suspension Reconstituted ER IM injection ER    Insomnia due to mental condition        Relevant Medications    DULoxetine (CYMBALTA) 60 MG capsule    fluPHENAZine (PROLIXIN) 5 MG tablet    hydrOXYzine  pamoate (VISTARIL) 50 MG capsule    mirtazapine (REMERON) 30 MG tablet    QUEtiapine (SEROquel) 50 MG tablet    ARIPiprazole ER (ABILIFY MAINTENA) 400 MG Suspension Reconstituted ER IM injection ER        Discussed medication options.  Continue Abilify Maintena with next injection in 28 days.   continue seroquel to assist with sleep and mood. Reviewed the risks, benefits, and side effects of the medications; patient acknowledged and verbally consented.  Continue other prescribed medications as prescribed. Patient is agreeable to call the [facility] with any worsening of symptoms.  Patient is aware to call 911 or go to the nearest ER should begin having SI/HI.     Prognosis: Guarded dependent on medication, follow up appointment and treatment plan compliance     Functionality: Fair.Depression seems to be impacting her motivation and energy levels causing the needed for excessive sleep.     Return in 12 weeks

## 2020-05-20 ENCOUNTER — CLINICAL SUPPORT (OUTPATIENT)
Dept: FAMILY MEDICINE CLINIC | Facility: CLINIC | Age: 35
End: 2020-05-20

## 2020-05-20 DIAGNOSIS — F33.9 RECURRENT MAJOR DEPRESSIVE DISORDER, REMISSION STATUS UNSPECIFIED (HCC): ICD-10-CM

## 2020-05-20 PROCEDURE — 96372 THER/PROPH/DIAG INJ SC/IM: CPT | Performed by: NURSE PRACTITIONER

## 2020-06-17 ENCOUNTER — OFFICE VISIT (OUTPATIENT)
Dept: PSYCHIATRY | Facility: CLINIC | Age: 35
End: 2020-06-17

## 2020-06-17 VITALS
TEMPERATURE: 98.4 F | SYSTOLIC BLOOD PRESSURE: 118 MMHG | WEIGHT: 166 LBS | HEIGHT: 66 IN | HEART RATE: 86 BPM | BODY MASS INDEX: 26.68 KG/M2 | DIASTOLIC BLOOD PRESSURE: 76 MMHG

## 2020-06-17 DIAGNOSIS — F33.9 RECURRENT MAJOR DEPRESSIVE DISORDER, REMISSION STATUS UNSPECIFIED (HCC): ICD-10-CM

## 2020-06-17 DIAGNOSIS — F25.9 SCHIZOAFFECTIVE DISORDER, UNSPECIFIED TYPE (HCC): ICD-10-CM

## 2020-06-17 DIAGNOSIS — F41.1 GENERALIZED ANXIETY DISORDER: Primary | ICD-10-CM

## 2020-06-17 DIAGNOSIS — F51.05 INSOMNIA DUE TO MENTAL CONDITION: ICD-10-CM

## 2020-06-17 PROCEDURE — 99214 OFFICE O/P EST MOD 30 MIN: CPT | Performed by: NURSE PRACTITIONER

## 2020-06-17 PROCEDURE — 96372 THER/PROPH/DIAG INJ SC/IM: CPT | Performed by: NURSE PRACTITIONER

## 2020-06-17 RX ORDER — MIRTAZAPINE 30 MG/1
30 TABLET, FILM COATED ORAL NIGHTLY
Qty: 30 TABLET | Refills: 2 | Status: SHIPPED | OUTPATIENT
Start: 2020-06-17 | End: 2020-09-09 | Stop reason: SDUPTHER

## 2020-06-17 RX ORDER — DULOXETIN HYDROCHLORIDE 60 MG/1
60 CAPSULE, DELAYED RELEASE ORAL DAILY
Qty: 30 CAPSULE | Refills: 2 | Status: SHIPPED | OUTPATIENT
Start: 2020-06-17 | End: 2020-09-09 | Stop reason: SDUPTHER

## 2020-06-17 RX ORDER — HYDROXYZINE PAMOATE 50 MG/1
50 CAPSULE ORAL 3 TIMES DAILY PRN
Qty: 90 CAPSULE | Refills: 2 | Status: SHIPPED | OUTPATIENT
Start: 2020-06-17 | End: 2020-09-09 | Stop reason: SDUPTHER

## 2020-06-17 RX ORDER — TOPIRAMATE 100 MG/1
100 TABLET, FILM COATED ORAL 2 TIMES DAILY
Qty: 60 TABLET | Refills: 2 | Status: SHIPPED | OUTPATIENT
Start: 2020-06-17 | End: 2020-10-13

## 2020-06-17 RX ORDER — FLUPHENAZINE HYDROCHLORIDE 5 MG/1
5 TABLET ORAL DAILY
Qty: 30 TABLET | Refills: 2 | Status: SHIPPED | OUTPATIENT
Start: 2020-06-17 | End: 2020-09-09 | Stop reason: SDUPTHER

## 2020-06-17 RX ORDER — QUETIAPINE FUMARATE 50 MG/1
50 TABLET, FILM COATED ORAL NIGHTLY
Qty: 30 TABLET | Refills: 2 | Status: SHIPPED | OUTPATIENT
Start: 2020-06-17 | End: 2020-09-09 | Stop reason: SDUPTHER

## 2020-06-17 NOTE — PROGRESS NOTES
"  Subjective   Cintia Issa is a 34 y.o. female is here today for medication management follow-up, she presents to her appointment on time at [facility].      Chief Complaint: Follow up with psychosis    History of Present Illness   She states that she has been taking her medications as prescribed with no SE or problems.  She states that she feels like her anxieyt is lower, she denies any feelings of depression.  Denies any problems with hallucinations.  She states that she is sleeping good, she is getting at least 6 hours per night with no NM.  She states that she has had some \"bad dreams\" recently.  She states that her appetite has been good with noted weight loss, she is eating less and monitoring her intake.  Denies any recent health issues.  Denies any new stressors.  Denies any SI/HI.    The following portions of the patient's history were reviewed and updated as appropriate: allergies, current medications, past family history, past medical history, past social history, past surgical history and problem list.    Review of Systems   Constitutional: Negative for appetite change, chills, diaphoresis, fatigue, fever and unexpected weight change.   HENT: Negative for hearing loss, sore throat, trouble swallowing and voice change.    Eyes: Negative for photophobia and visual disturbance.   Respiratory: Negative for cough, chest tightness and shortness of breath.    Cardiovascular: Negative for chest pain and palpitations.   Gastrointestinal: Negative for abdominal pain, constipation, nausea and vomiting.   Endocrine: Negative for cold intolerance and heat intolerance.   Genitourinary: Negative for dysuria and frequency.   Musculoskeletal: Positive for arthralgias and back pain. Negative for joint swelling and neck stiffness.   Skin: Negative for color change and wound.   Allergic/Immunologic: Negative for environmental allergies and immunocompromised state.   Neurological: Negative for " "dizziness, tremors, seizures, syncope, weakness, light-headedness and headaches.   Hematological: Negative for adenopathy. Does not bruise/bleed easily.       Objective   Physical Exam   Constitutional: She appears well-developed and well-nourished. No distress.   Neurological: She is alert. Coordination and gait normal.   Vitals reviewed.    Blood pressure 118/76, pulse 86, temperature 98.4 °F (36.9 °C), height 167.6 cm (65.98\"), weight 75.3 kg (166 lb). Body mass index is 26.81 kg/m².    Medication List:   Current Outpatient Medications   Medication Sig Dispense Refill   • ARIPiprazole ER (ABILIFY MAINTENA) 400 MG Suspension Reconstituted ER IM injection ER Inject 400 mg into the appropriate muscle as directed by prescriber Every 28 (Twenty-Eight) Days. Indications: Schizoaffective 1 each 5   • DULoxetine (CYMBALTA) 60 MG capsule Take 1 capsule by mouth Daily. Indications: Schizoaffective disorder 30 capsule 2   • fluPHENAZine (PROLIXIN) 5 MG tablet Take 1 tablet by mouth Daily. Indications: Psychosis 30 tablet 2   • hydrOXYzine pamoate (VISTARIL) 50 MG capsule Take 1 capsule by mouth 3 (Three) Times a Day As Needed for Anxiety. Indications: Feeling Anxious 90 capsule 2   • metoprolol tartrate (LOPRESSOR) 50 MG tablet Take 1 tablet by mouth Every 12 (Twelve) Hours. 60 tablet 0   • mirtazapine (REMERON) 30 MG tablet Take 1 tablet by mouth Every Night for 332 doses. Indications: Major Depressive Disorder 30 tablet 2   • QUEtiapine (SEROquel) 50 MG tablet Take 1 tablet by mouth Every Night. 30 tablet 2   • topiramate (TOPAMAX) 100 MG tablet Take 1 tablet by mouth 2 (Two) Times a Day. Indications: Mood disorder 60 tablet 2     Current Facility-Administered Medications   Medication Dose Route Frequency Provider Last Rate Last Dose   • ARIPiprazole ER (ABILIFY MAINTENA) IM injection  mg  400 mg Intramuscular Q28 Days Dnona Freeman APRN   400 mg at 03/06/19 1610   • ARIPiprazole ER (ABILIFY MAINTENA) IM " injection  mg  400 mg Intramuscular Q28 Days Donna Freemanyce, APRN   400 mg at 02/05/20 1048   • ARIPiprazole ER (ABILIFY MAINTENA) IM injection  mg  400 mg Intramuscular Q28 Days Donna Freemanyce, APRN   400 mg at 06/17/20 1311       Mental Status Exam:   Hygiene:   good  Cooperation:  Guarded  Eye Contact:  Fair  Psychomotor Behavior:  Appropriate  Affect:  Appropriate  Hopelessness: Denies  Speech:  Minimal  Thought Process:  Linear  Thought Content:  Mood congurent  Suicidal:  None  Homicidal:  None  Hallucinations:  None  Delusion:  None  Memory:  Intact  Orientation:  Person, Place, Time and Situation  Reliability:  fair  Insight:  Fair  Judgement:  Fair  Impulse Control:  Fair  Physical/Medical Issues:  No     Assessment/Plan   Problems Addressed this Visit        Other    Schizoaffective disorder (CMS/HCC)    Relevant Medications    ARIPiprazole ER (ABILIFY MAINTENA) 400 MG Suspension Reconstituted ER IM injection ER    DULoxetine (CYMBALTA) 60 MG capsule    fluPHENAZine (PROLIXIN) 5 MG tablet    hydrOXYzine pamoate (VISTARIL) 50 MG capsule    mirtazapine (REMERON) 30 MG tablet    QUEtiapine (SEROquel) 50 MG tablet    Major depressive disorder, recurrent (CMS/HCC)    Relevant Medications    ARIPiprazole ER (ABILIFY MAINTENA) 400 MG Suspension Reconstituted ER IM injection ER    DULoxetine (CYMBALTA) 60 MG capsule    fluPHENAZine (PROLIXIN) 5 MG tablet    hydrOXYzine pamoate (VISTARIL) 50 MG capsule    mirtazapine (REMERON) 30 MG tablet    QUEtiapine (SEROquel) 50 MG tablet      Other Visit Diagnoses     Generalized anxiety disorder    -  Primary    Relevant Medications    ARIPiprazole ER (ABILIFY MAINTENA) 400 MG Suspension Reconstituted ER IM injection ER    DULoxetine (CYMBALTA) 60 MG capsule    fluPHENAZine (PROLIXIN) 5 MG tablet    hydrOXYzine pamoate (VISTARIL) 50 MG capsule    mirtazapine (REMERON) 30 MG tablet    QUEtiapine (SEROquel) 50 MG tablet    Insomnia due to mental  condition        Relevant Medications    ARIPiprazole ER (ABILIFY MAINTENA) 400 MG Suspension Reconstituted ER IM injection ER    DULoxetine (CYMBALTA) 60 MG capsule    fluPHENAZine (PROLIXIN) 5 MG tablet    hydrOXYzine pamoate (VISTARIL) 50 MG capsule    mirtazapine (REMERON) 30 MG tablet    QUEtiapine (SEROquel) 50 MG tablet        Discussed medication options.  Continue Abilify Maintena with next injection in 28 days.   continue seroquel to assist with sleep and mood. Reviewed the risks, benefits, and side effects of the medications; patient acknowledged and verbally consented.  Continue other prescribed medications as prescribed. Patient is agreeable to call the [facility] with any worsening of symptoms.  Patient is aware to call 911 or go to the nearest ER should begin having SI/HI.     Prognosis: Guarded dependent on medication, follow up appointment and treatment plan compliance     Functionality: Fair.Depression seems to be impacting her motivation and energy levels causing the needed for excessive sleep.     Return in 12 weeks

## 2020-08-12 ENCOUNTER — LAB (OUTPATIENT)
Dept: FAMILY MEDICINE CLINIC | Facility: CLINIC | Age: 35
End: 2020-08-12

## 2020-08-12 PROCEDURE — 96372 THER/PROPH/DIAG INJ SC/IM: CPT | Performed by: NURSE PRACTITIONER

## 2020-09-09 ENCOUNTER — OFFICE VISIT (OUTPATIENT)
Dept: PSYCHIATRY | Facility: CLINIC | Age: 35
End: 2020-09-09

## 2020-09-09 VITALS
WEIGHT: 164.1 LBS | HEART RATE: 103 BPM | HEIGHT: 66 IN | SYSTOLIC BLOOD PRESSURE: 127 MMHG | BODY MASS INDEX: 26.37 KG/M2 | TEMPERATURE: 98.2 F | DIASTOLIC BLOOD PRESSURE: 79 MMHG

## 2020-09-09 DIAGNOSIS — F51.05 INSOMNIA DUE TO MENTAL CONDITION: ICD-10-CM

## 2020-09-09 DIAGNOSIS — F41.1 GENERALIZED ANXIETY DISORDER: Primary | ICD-10-CM

## 2020-09-09 DIAGNOSIS — F25.9 SCHIZOAFFECTIVE DISORDER, UNSPECIFIED TYPE (HCC): ICD-10-CM

## 2020-09-09 PROCEDURE — 96372 THER/PROPH/DIAG INJ SC/IM: CPT | Performed by: NURSE PRACTITIONER

## 2020-09-09 PROCEDURE — 99214 OFFICE O/P EST MOD 30 MIN: CPT | Performed by: NURSE PRACTITIONER

## 2020-09-09 RX ORDER — MIRTAZAPINE 30 MG/1
30 TABLET, FILM COATED ORAL NIGHTLY
Qty: 30 TABLET | Refills: 2 | Status: SHIPPED | OUTPATIENT
Start: 2020-09-09 | End: 2020-12-02 | Stop reason: SDUPTHER

## 2020-09-09 RX ORDER — FLUPHENAZINE HYDROCHLORIDE 5 MG/1
5 TABLET ORAL DAILY
Qty: 30 TABLET | Refills: 2 | Status: SHIPPED | OUTPATIENT
Start: 2020-09-09 | End: 2020-12-02 | Stop reason: SDUPTHER

## 2020-09-09 RX ORDER — DULOXETIN HYDROCHLORIDE 60 MG/1
60 CAPSULE, DELAYED RELEASE ORAL DAILY
Qty: 30 CAPSULE | Refills: 2 | Status: SHIPPED | OUTPATIENT
Start: 2020-09-09 | End: 2020-12-02 | Stop reason: SDUPTHER

## 2020-09-09 RX ORDER — QUETIAPINE FUMARATE 50 MG/1
50 TABLET, FILM COATED ORAL NIGHTLY
Qty: 30 TABLET | Refills: 2 | Status: SHIPPED | OUTPATIENT
Start: 2020-09-09 | End: 2020-12-02 | Stop reason: SDUPTHER

## 2020-09-09 RX ORDER — HYDROXYZINE PAMOATE 50 MG/1
50 CAPSULE ORAL 3 TIMES DAILY PRN
Qty: 90 CAPSULE | Refills: 2 | Status: SHIPPED | OUTPATIENT
Start: 2020-09-09 | End: 2020-12-02 | Stop reason: SDUPTHER

## 2020-09-09 NOTE — PROGRESS NOTES
Subjective   Cintia Issa is a 35 y.o. female is here today for medication management follow-up, she presents to her appointment on time at [facility].      Chief Complaint: Follow up with psychosis    History of Present Illness  She states that she is doing fine, denies any problems with her medications.  She states that she is taking her medications as prescribed with no SE or problems.  She rates her mood as being stable with no depression or anxiety. She states that she is sleeping good, she is getting at least 10 hours per night with no NM.  Appetite has been good with noted weight loss of about 2 pounds since June, she has been monitoring her intake with no exercise.  Denies any new stressors.  Denies any other health issues.  Denies any AV hallucinations, denies any SI/HI.      The following portions of the patient's history were reviewed and updated as appropriate: allergies, current medications, past family history, past medical history, past social history, past surgical history and problem list.    Review of Systems   Constitutional: Negative for appetite change, chills, diaphoresis, fatigue, fever and unexpected weight change.   HENT: Negative for hearing loss, sore throat, trouble swallowing and voice change.    Eyes: Negative for photophobia and visual disturbance.   Respiratory: Negative for cough, chest tightness and shortness of breath.    Cardiovascular: Negative for chest pain and palpitations.   Gastrointestinal: Negative for abdominal pain, constipation, nausea and vomiting.   Endocrine: Negative for cold intolerance and heat intolerance.   Genitourinary: Negative for dysuria and frequency.   Musculoskeletal: Positive for arthralgias and back pain. Negative for joint swelling and neck stiffness.   Skin: Negative for color change and wound.   Allergic/Immunologic: Negative for environmental allergies and immunocompromised state.   Neurological: Negative for  "dizziness, tremors, seizures, syncope, weakness, light-headedness and headaches.   Hematological: Negative for adenopathy. Does not bruise/bleed easily.     Objective   Physical Exam   Constitutional: She appears well-developed and well-nourished. No distress.   Neurological: She is alert. Coordination and gait normal.   Vitals reviewed.    Blood pressure 127/79, pulse 103, temperature 98.2 °F (36.8 °C), height 167.6 cm (65.98\"), weight 74.4 kg (164 lb 1.6 oz). Body mass index is 26.5 kg/m².    Medication List:   Current Outpatient Medications   Medication Sig Dispense Refill   • ARIPiprazole ER (ABILIFY MAINTENA) 400 MG Suspension Reconstituted ER IM injection ER Inject 400 mg into the appropriate muscle as directed by prescriber Every 28 (Twenty-Eight) Days. Indications: Schizoaffective 1 each 5   • DULoxetine (CYMBALTA) 60 MG capsule Take 1 capsule by mouth Daily. Indications: Schizoaffective disorder 30 capsule 2   • fluPHENAZine (PROLIXIN) 5 MG tablet Take 1 tablet by mouth Daily. Indications: Psychosis 30 tablet 2   • hydrOXYzine pamoate (VISTARIL) 50 MG capsule Take 1 capsule by mouth 3 (Three) Times a Day As Needed for Anxiety. Indications: Feeling Anxious 90 capsule 2   • metoprolol tartrate (LOPRESSOR) 50 MG tablet Take 1 tablet by mouth Every 12 (Twelve) Hours. 60 tablet 0   • mirtazapine (REMERON) 30 MG tablet Take 1 tablet by mouth Every Night for 332 doses. Indications: Major Depressive Disorder 30 tablet 2   • QUEtiapine (SEROquel) 50 MG tablet Take 1 tablet by mouth Every Night. 30 tablet 2   • topiramate (TOPAMAX) 100 MG tablet Take 1 tablet by mouth 2 (Two) Times a Day. Indications: Mood disorder 60 tablet 2     Current Facility-Administered Medications   Medication Dose Route Frequency Provider Last Rate Last Dose   • ARIPiprazole ER (ABILIFY MAINTENA) IM injection  mg  400 mg Intramuscular Q28 Days Donna Freeman APRN   400 mg at 03/06/19 1610   • ARIPiprazole ER (ABILIFY MAINTENA) " IM injection  mg  400 mg Intramuscular Q28 Days Donna Freemanyce, APRN   400 mg at 02/05/20 1048   • ARIPiprazole ER (ABILIFY MAINTENA) IM injection  mg  400 mg Intramuscular Q28 Days Donna Freemanyce, APRN   400 mg at 09/09/20 1326       Mental Status Exam:   Hygiene:   good  Cooperation:  Guarded  Eye Contact:  Fair  Psychomotor Behavior:  Appropriate  Affect:  Appropriate  Hopelessness: Denies  Speech:  Minimal  Thought Process:  Linear  Thought Content:  Mood congurent  Suicidal:  None  Homicidal:  None  Hallucinations:  None  Delusion:  None  Memory:  Intact  Orientation:  Person, Place, Time and Situation  Reliability:  fair  Insight:  Fair  Judgement:  Fair  Impulse Control:  Fair  Physical/Medical Issues:  No     Assessment/Plan   Problems Addressed this Visit        Other    Schizoaffective disorder (CMS/HCC)    Relevant Medications    QUEtiapine (SEROquel) 50 MG tablet    ARIPiprazole ER (ABILIFY MAINTENA) 400 MG Suspension Reconstituted ER IM injection ER    DULoxetine (CYMBALTA) 60 MG capsule    fluPHENAZine (PROLIXIN) 5 MG tablet    hydrOXYzine pamoate (VISTARIL) 50 MG capsule    mirtazapine (REMERON) 30 MG tablet      Other Visit Diagnoses     Generalized anxiety disorder    -  Primary    Relevant Medications    QUEtiapine (SEROquel) 50 MG tablet    ARIPiprazole ER (ABILIFY MAINTENA) 400 MG Suspension Reconstituted ER IM injection ER    DULoxetine (CYMBALTA) 60 MG capsule    fluPHENAZine (PROLIXIN) 5 MG tablet    hydrOXYzine pamoate (VISTARIL) 50 MG capsule    mirtazapine (REMERON) 30 MG tablet    Insomnia due to mental condition        Relevant Medications    QUEtiapine (SEROquel) 50 MG tablet    ARIPiprazole ER (ABILIFY MAINTENA) 400 MG Suspension Reconstituted ER IM injection ER    DULoxetine (CYMBALTA) 60 MG capsule    fluPHENAZine (PROLIXIN) 5 MG tablet    hydrOXYzine pamoate (VISTARIL) 50 MG capsule    mirtazapine (REMERON) 30 MG tablet        Discussed medication options.   Continue Abilify Maintena with next injection in 28 days.   continue seroquel to assist with sleep and mood. Reviewed the risks, benefits, and side effects of the medications; patient acknowledged and verbally consented.  Continue other prescribed medications as prescribed. Patient is agreeable to call the [facility] with any worsening of symptoms.  Patient is aware to call 911 or go to the nearest ER should begin having SI/HI.     Prognosis: Guarded dependent on medication, follow up appointment and treatment plan compliance     Functionality: Fair.Depression seems to be impacting her motivation and energy levels causing the needed for excessive sleep.     Return in 12 weeks

## 2020-10-13 RX ORDER — TOPIRAMATE 100 MG/1
TABLET, FILM COATED ORAL
Qty: 60 TABLET | Refills: 2 | Status: ON HOLD | OUTPATIENT
Start: 2020-10-13 | End: 2022-08-02

## 2020-10-26 ENCOUNTER — CLINICAL SUPPORT (OUTPATIENT)
Dept: FAMILY MEDICINE CLINIC | Facility: CLINIC | Age: 35
End: 2020-10-26

## 2020-10-26 DIAGNOSIS — F33.9 RECURRENT MAJOR DEPRESSIVE DISORDER, REMISSION STATUS UNSPECIFIED (HCC): ICD-10-CM

## 2020-10-26 PROCEDURE — 96372 THER/PROPH/DIAG INJ SC/IM: CPT | Performed by: NURSE PRACTITIONER

## 2020-12-02 ENCOUNTER — OFFICE VISIT (OUTPATIENT)
Dept: PSYCHIATRY | Facility: CLINIC | Age: 35
End: 2020-12-02

## 2020-12-02 VITALS
WEIGHT: 168 LBS | SYSTOLIC BLOOD PRESSURE: 132 MMHG | DIASTOLIC BLOOD PRESSURE: 79 MMHG | BODY MASS INDEX: 27.13 KG/M2 | HEART RATE: 103 BPM | TEMPERATURE: 97.7 F

## 2020-12-02 DIAGNOSIS — F51.05 INSOMNIA DUE TO MENTAL CONDITION: ICD-10-CM

## 2020-12-02 DIAGNOSIS — F41.1 GENERALIZED ANXIETY DISORDER: ICD-10-CM

## 2020-12-02 DIAGNOSIS — F25.9 SCHIZOAFFECTIVE DISORDER, UNSPECIFIED TYPE (HCC): Primary | ICD-10-CM

## 2020-12-02 PROCEDURE — 96372 THER/PROPH/DIAG INJ SC/IM: CPT | Performed by: NURSE PRACTITIONER

## 2020-12-02 PROCEDURE — 99214 OFFICE O/P EST MOD 30 MIN: CPT | Performed by: NURSE PRACTITIONER

## 2020-12-02 RX ORDER — HYDROXYZINE PAMOATE 50 MG/1
50 CAPSULE ORAL 3 TIMES DAILY PRN
Qty: 90 CAPSULE | Refills: 2 | Status: SHIPPED | OUTPATIENT
Start: 2020-12-02 | End: 2021-02-24 | Stop reason: SDUPTHER

## 2020-12-02 RX ORDER — MIRTAZAPINE 30 MG/1
30 TABLET, FILM COATED ORAL NIGHTLY
Qty: 30 TABLET | Refills: 2 | Status: SHIPPED | OUTPATIENT
Start: 2020-12-02 | End: 2021-02-24 | Stop reason: SDUPTHER

## 2020-12-02 RX ORDER — FLUPHENAZINE HYDROCHLORIDE 5 MG/1
5 TABLET ORAL DAILY
Qty: 30 TABLET | Refills: 2 | Status: SHIPPED | OUTPATIENT
Start: 2020-12-02 | End: 2021-02-24 | Stop reason: SDUPTHER

## 2020-12-02 RX ORDER — DULOXETIN HYDROCHLORIDE 60 MG/1
60 CAPSULE, DELAYED RELEASE ORAL DAILY
Qty: 30 CAPSULE | Refills: 2 | Status: SHIPPED | OUTPATIENT
Start: 2020-12-02 | End: 2021-02-24 | Stop reason: SDUPTHER

## 2020-12-02 RX ORDER — QUETIAPINE FUMARATE 50 MG/1
50 TABLET, FILM COATED ORAL NIGHTLY
Qty: 30 TABLET | Refills: 2 | Status: SHIPPED | OUTPATIENT
Start: 2020-12-02 | End: 2021-02-24 | Stop reason: SDUPTHER

## 2020-12-02 NOTE — PROGRESS NOTES
"  Subjective   Cintia Issa is a 35 y.o. female is here today for medication management follow-up, she presents to her appointment on time at [facility].      Chief Complaint: Follow up with psychosis    History of Present Illness  She states that she is doing ok but that she is having some anxiety, a little stressed out about the Holidays with some initial issues falling asleep because of her mind racing.  She states that she is taking all of her medications as prescribed with no SE or problems.  She rates her depression and anxiety about 3/10 with 10 being the worse.  She denies any problems with mood swings.  Denies any AV hallucinations, states that her paranoia has been better under control, shares that it is \"still there\" but she doesn't focus on it.  She states that her sleep has been good, she is getting between 7-8 hours per night with occasional NM.  She states that her appetite has been good with no significant weight changes.  She states that she gets stressed out the Holidays and her financial stressors.  Denies any SI/HI.      The following portions of the patient's history were reviewed and updated as appropriate: allergies, current medications, past family history, past medical history, past social history, past surgical history and problem list.    Review of Systems   Constitutional: Negative for appetite change, chills, diaphoresis, fatigue, fever and unexpected weight change.   HENT: Negative for hearing loss, sore throat, trouble swallowing and voice change.    Eyes: Negative for photophobia and visual disturbance.   Respiratory: Negative for cough, chest tightness and shortness of breath.    Cardiovascular: Negative for chest pain and palpitations.   Gastrointestinal: Negative for abdominal pain, constipation, nausea and vomiting.   Endocrine: Negative for cold intolerance and heat intolerance.   Genitourinary: Negative for dysuria and frequency.   Musculoskeletal: " Positive for arthralgias and back pain. Negative for joint swelling and neck stiffness.   Skin: Negative for color change and wound.   Allergic/Immunologic: Negative for environmental allergies and immunocompromised state.   Neurological: Negative for dizziness, tremors, seizures, syncope, weakness, light-headedness and headaches.   Hematological: Negative for adenopathy. Does not bruise/bleed easily.     Objective   Physical Exam   Constitutional: She appears well-developed. No distress.   Neurological: She is alert. Coordination and gait normal.   Vitals reviewed.    Blood pressure 132/79, pulse 103, temperature 97.7 °F (36.5 °C), weight 76.2 kg (168 lb). Body mass index is 27.13 kg/m².    Medication List:   Current Outpatient Medications   Medication Sig Dispense Refill   • ARIPiprazole ER (ABILIFY MAINTENA) 400 MG Suspension Reconstituted ER IM injection ER Inject 400 mg into the appropriate muscle as directed by prescriber Every 28 (Twenty-Eight) Days. Indications: Schizoaffective 1 each 5   • DULoxetine (CYMBALTA) 60 MG capsule Take 1 capsule by mouth Daily. Indications: Schizoaffective disorder 30 capsule 2   • fluPHENAZine (PROLIXIN) 5 MG tablet Take 1 tablet by mouth Daily. Indications: Psychosis 30 tablet 2   • hydrOXYzine pamoate (VISTARIL) 50 MG capsule Take 1 capsule by mouth 3 (Three) Times a Day As Needed for Anxiety. Indications: Feeling Anxious 90 capsule 2   • metoprolol tartrate (LOPRESSOR) 50 MG tablet Take 1 tablet by mouth Every 12 (Twelve) Hours. 60 tablet 0   • mirtazapine (REMERON) 30 MG tablet Take 1 tablet by mouth Every Night for 332 doses. Indications: Major Depressive Disorder 30 tablet 2   • QUEtiapine (SEROquel) 50 MG tablet Take 1 tablet by mouth Every Night. 30 tablet 2   • topiramate (TOPAMAX) 100 MG tablet TAKE ONE TABLET BY MOUTH TWO TIMES A DAY TAKE FOR MOOD DISORDER 60 tablet 2     Current Facility-Administered Medications   Medication Dose Route Frequency Provider Last Rate  Last Admin   • ARIPiprazole ER (ABILIFY MAINTENA) IM injection  mg  400 mg Intramuscular Q28 Days PeteDonna steele, APRN   400 mg at 03/06/19 1610   • ARIPiprazole ER (ABILIFY MAINTENA) IM injection  mg  400 mg Intramuscular Q28 Days Pete, Donna Vickyce, APRN   400 mg at 02/05/20 1048   • ARIPiprazole ER (ABILIFY MAINTENA) IM injection  mg  400 mg Intramuscular Q28 Days Pete, Donna Vickyce, APRN   400 mg at 10/26/20 0914   • ARIPiprazole ER (ABILIFY MAINTENA) IM injection  mg  400 mg Intramuscular Q28 Days Pete, Donna Vickyce, APRN           Mental Status Exam:   Hygiene:   good  Cooperation:  Guarded  Eye Contact:  Fair  Psychomotor Behavior:  Appropriate  Affect:  Appropriate  Hopelessness: Denies  Speech:  Minimal  Thought Process:  Linear  Thought Content:  Mood congurent  Suicidal:  None  Homicidal:  None  Hallucinations:  None  Delusion:  None  Memory:  Intact  Orientation:  Person, Place, Time and Situation  Reliability:  fair  Insight:  Fair  Judgement:  Fair  Impulse Control:  Fair  Physical/Medical Issues:  No     Assessment/Plan   Problems Addressed this Visit        Other    Schizoaffective disorder (CMS/HCC) - Primary    Relevant Medications    ARIPiprazole ER (ABILIFY MAINTENA) 400 MG Suspension Reconstituted ER IM injection ER    DULoxetine (CYMBALTA) 60 MG capsule    fluPHENAZine (PROLIXIN) 5 MG tablet    hydrOXYzine pamoate (VISTARIL) 50 MG capsule    mirtazapine (REMERON) 30 MG tablet    QUEtiapine (SEROquel) 50 MG tablet    ARIPiprazole ER (ABILIFY MAINTENA) IM injection  mg (Start on 12/2/2020 11:27 AM)      Other Visit Diagnoses     Generalized anxiety disorder        Relevant Medications    ARIPiprazole ER (ABILIFY MAINTENA) 400 MG Suspension Reconstituted ER IM injection ER    DULoxetine (CYMBALTA) 60 MG capsule    fluPHENAZine (PROLIXIN) 5 MG tablet    hydrOXYzine pamoate (VISTARIL) 50 MG capsule    mirtazapine (REMERON) 30 MG tablet    QUEtiapine  (SEROquel) 50 MG tablet    Insomnia due to mental condition        Relevant Medications    ARIPiprazole ER (ABILIFY MAINTENA) 400 MG Suspension Reconstituted ER IM injection ER    DULoxetine (CYMBALTA) 60 MG capsule    fluPHENAZine (PROLIXIN) 5 MG tablet    hydrOXYzine pamoate (VISTARIL) 50 MG capsule    mirtazapine (REMERON) 30 MG tablet    QUEtiapine (SEROquel) 50 MG tablet      Diagnoses       Codes Comments    Schizoaffective disorder, unspecified type (CMS/HCC)    -  Primary ICD-10-CM: F25.9  ICD-9-CM: 295.70     Generalized anxiety disorder     ICD-10-CM: F41.1  ICD-9-CM: 300.02     Insomnia due to mental condition     ICD-10-CM: F51.05  ICD-9-CM: 300.9, 327.02         Discussed medication options.  Continue Abilify Maintena with next injection in 28 days.   continue seroquel to assist with sleep and mood, cymbalta for depression and anxiety, hydroxyzine for anxiety, mirtazepine for depression and sleep, and prolixin for thoughts. Reviewed the risks, benefits, and side effects of the medications; patient acknowledged and verbally consented.  Continue other prescribed medications as prescribed. Patient is agreeable to call the [facility] with any worsening of symptoms.  Patient is aware to call 911 or go to the nearest ER should begin having SI/HI.     Prognosis: Guarded dependent on medication, follow up appointment and treatment plan compliance     Functionality: Fair.Depression seems to be impacting her motivation and energy levels causing the needed for excessive sleep.     Return in 12 weeks

## 2021-01-29 ENCOUNTER — TELEPHONE (OUTPATIENT)
Dept: PSYCHIATRY | Facility: CLINIC | Age: 36
End: 2021-01-29

## 2021-02-24 ENCOUNTER — OFFICE VISIT (OUTPATIENT)
Dept: PSYCHIATRY | Facility: CLINIC | Age: 36
End: 2021-02-24

## 2021-02-24 ENCOUNTER — CLINICAL SUPPORT (OUTPATIENT)
Dept: FAMILY MEDICINE CLINIC | Facility: CLINIC | Age: 36
End: 2021-02-24

## 2021-02-24 VITALS
BODY MASS INDEX: 27.88 KG/M2 | HEART RATE: 102 BPM | WEIGHT: 172.6 LBS | DIASTOLIC BLOOD PRESSURE: 82 MMHG | SYSTOLIC BLOOD PRESSURE: 129 MMHG

## 2021-02-24 DIAGNOSIS — F51.05 INSOMNIA DUE TO MENTAL CONDITION: ICD-10-CM

## 2021-02-24 DIAGNOSIS — F33.9 RECURRENT MAJOR DEPRESSIVE DISORDER, REMISSION STATUS UNSPECIFIED (HCC): ICD-10-CM

## 2021-02-24 DIAGNOSIS — F41.1 GENERALIZED ANXIETY DISORDER: ICD-10-CM

## 2021-02-24 DIAGNOSIS — F25.9 SCHIZOAFFECTIVE DISORDER, UNSPECIFIED TYPE (HCC): ICD-10-CM

## 2021-02-24 DIAGNOSIS — F25.9 SCHIZOAFFECTIVE DISORDER, UNSPECIFIED TYPE (HCC): Primary | ICD-10-CM

## 2021-02-24 DIAGNOSIS — F22 PARANOID BEHAVIOR (HCC): ICD-10-CM

## 2021-02-24 PROCEDURE — 99214 OFFICE O/P EST MOD 30 MIN: CPT | Performed by: NURSE PRACTITIONER

## 2021-02-24 PROCEDURE — 96372 THER/PROPH/DIAG INJ SC/IM: CPT | Performed by: NURSE PRACTITIONER

## 2021-02-24 RX ORDER — DULOXETIN HYDROCHLORIDE 60 MG/1
60 CAPSULE, DELAYED RELEASE ORAL DAILY
Qty: 30 CAPSULE | Refills: 2 | Status: ON HOLD | OUTPATIENT
Start: 2021-02-24 | End: 2022-08-02

## 2021-02-24 RX ORDER — HYDROXYZINE PAMOATE 50 MG/1
50 CAPSULE ORAL 3 TIMES DAILY PRN
Qty: 90 CAPSULE | Refills: 2 | Status: ON HOLD | OUTPATIENT
Start: 2021-02-24 | End: 2022-08-02

## 2021-02-24 RX ORDER — QUETIAPINE FUMARATE 50 MG/1
50 TABLET, FILM COATED ORAL NIGHTLY
Qty: 30 TABLET | Refills: 2 | Status: ON HOLD | OUTPATIENT
Start: 2021-02-24 | End: 2022-08-02

## 2021-02-24 RX ORDER — MIRTAZAPINE 30 MG/1
30 TABLET, FILM COATED ORAL NIGHTLY
Qty: 30 TABLET | Refills: 2 | Status: ON HOLD | OUTPATIENT
Start: 2021-02-24 | End: 2022-08-02

## 2021-02-24 RX ORDER — FLUPHENAZINE HYDROCHLORIDE 5 MG/1
5 TABLET ORAL DAILY
Qty: 30 TABLET | Refills: 2 | Status: ON HOLD | OUTPATIENT
Start: 2021-02-24 | End: 2022-08-02

## 2021-02-24 NOTE — PROGRESS NOTES
Subjective   Cintia Issa is a 35 y.o. female is here today for medication management follow-up, she presents to her appointment on time at [facility].      Chief Complaint: Follow up with psychosis    History of Present Illness  She states that she didn't sleep that well last night because her dog was agitated about something and that kept her up.  She states that she likes her sleep and it was awful last night.  She states that otherwise she is doing ok, but states that she has been feeling a little more anxious lately.  She states that she doesn't believe that her medications need to be changes.  She states that she has been having a lot of bad dreams lately, she states that she is stressed out about money issues and not having any transportation, she worries about the [place] Pandemic.  She states that she has had more headaches lately with dizzy spells, recommended that she discuss with her PCP.  She rates her schizoaffective disorder about 3/10 with 10 being the worse; anxiety rates about 6/10 with 10 being the worse.  She states that she has been on edge, worried, stressed, etc.  She states that she is normally getting between 9-10 hours of sleep per night with occasional NM that are frightening- happens about few times per week.  She states that her appetite is ok with stable weight.  She states that she has not been acutely ill other than the dizziness.  Denies any AV hallucinations, denies any SI/HI.     The following portions of the patient's history were reviewed and updated as appropriate: allergies, current medications, past family history, past medical history, past social history, past surgical history and problem list.    Review of Systems   Constitutional: Negative for appetite change, chills, diaphoresis, fatigue, fever and unexpected weight change.   HENT: Negative for hearing loss, sore throat, trouble swallowing and voice change.    Eyes: Negative for photophobia  and visual disturbance.   Respiratory: Negative for cough, chest tightness and shortness of breath.    Cardiovascular: Negative for chest pain and palpitations.   Gastrointestinal: Negative for abdominal pain, constipation, nausea and vomiting.   Endocrine: Negative for cold intolerance and heat intolerance.   Genitourinary: Negative for dysuria and frequency.   Musculoskeletal: Positive for arthralgias and back pain. Negative for joint swelling and neck stiffness.   Skin: Negative for color change and wound.   Allergic/Immunologic: Negative for environmental allergies and immunocompromised state.   Neurological: Negative for dizziness, tremors, seizures, syncope, weakness, light-headedness and headaches.   Hematological: Negative for adenopathy. Does not bruise/bleed easily.     Objective   Physical Exam   Constitutional: She appears well-developed. No distress.   Neurological: She is alert. Coordination and gait normal.   Vitals reviewed.    Blood pressure 129/82, pulse 102, weight 78.3 kg (172 lb 9.6 oz). Body mass index is 27.88 kg/m².    Medication List:   Current Outpatient Medications   Medication Sig Dispense Refill   • ARIPiprazole ER (ABILIFY MAINTENA) 400 MG Suspension Reconstituted ER IM injection ER Inject 400 mg into the appropriate muscle as directed by prescriber Every 28 (Twenty-Eight) Days. Indications: Schizoaffective 1 each 5   • DULoxetine (CYMBALTA) 60 MG capsule Take 1 capsule by mouth Daily. Indications: Schizoaffective disorder 30 capsule 2   • fluPHENAZine (PROLIXIN) 5 MG tablet Take 1 tablet by mouth Daily. Indications: Psychosis 30 tablet 2   • hydrOXYzine pamoate (VISTARIL) 50 MG capsule Take 1 capsule by mouth 3 (Three) Times a Day As Needed for Anxiety. Indications: Feeling Anxious 90 capsule 2   • metoprolol tartrate (LOPRESSOR) 50 MG tablet Take 1 tablet by mouth Every 12 (Twelve) Hours. 60 tablet 0   • mirtazapine (REMERON) 30 MG tablet Take 1 tablet by mouth Every Night for 332  doses. Indications: Major Depressive Disorder 30 tablet 2   • QUEtiapine (SEROquel) 50 MG tablet Take 1 tablet by mouth Every Night. 30 tablet 2   • topiramate (TOPAMAX) 100 MG tablet TAKE ONE TABLET BY MOUTH TWO TIMES A DAY TAKE FOR MOOD DISORDER 60 tablet 2     Current Facility-Administered Medications   Medication Dose Route Frequency Provider Last Rate Last Admin   • ARIPiprazole ER (ABILIFY MAINTENA) IM injection  mg  400 mg Intramuscular Q28 Days Pete, Donna Carolyn, APRN   400 mg at 03/06/19 1610   • ARIPiprazole ER (ABILIFY MAINTENA) IM injection  mg  400 mg Intramuscular Q28 Days Pete, Donna Carolyn, APRN   400 mg at 02/05/20 1048   • ARIPiprazole ER (ABILIFY MAINTENA) IM injection  mg  400 mg Intramuscular Q28 Days Pete, Donna Carolyn, APRN   400 mg at 10/26/20 0914   • ARIPiprazole ER (ABILIFY MAINTENA) IM injection  mg  400 mg Intramuscular Q28 Days Pete, Donna Carolyn, APRN   400 mg at 02/24/21 1419       Mental Status Exam:   Hygiene:   good  Cooperation:  Guarded  Eye Contact:  Fair  Psychomotor Behavior:  Appropriate  Affect:  Appropriate  Hopelessness: Denies  Speech:  Minimal  Thought Process:  Linear  Thought Content:  Mood congurent  Suicidal:  None  Homicidal:  None  Hallucinations:  None  Delusion:  None  Memory:  Intact  Orientation:  Person, Place, Time and Situation  Reliability:  fair  Insight:  Fair  Judgement:  Fair  Impulse Control:  Fair  Physical/Medical Issues:  No     Assessment/Plan   Problems Addressed this Visit        Mental Health    Schizoaffective disorder (CMS/HCC) - Primary    Relevant Medications    ARIPiprazole ER (ABILIFY MAINTENA) 400 MG Suspension Reconstituted ER IM injection ER    DULoxetine (CYMBALTA) 60 MG capsule    fluPHENAZine (PROLIXIN) 5 MG tablet    hydrOXYzine pamoate (VISTARIL) 50 MG capsule    mirtazapine (REMERON) 30 MG tablet    QUEtiapine (SEROquel) 50 MG tablet      Other Visit Diagnoses     Generalized anxiety disorder         Relevant Medications    ARIPiprazole ER (ABILIFY MAINTENA) 400 MG Suspension Reconstituted ER IM injection ER    DULoxetine (CYMBALTA) 60 MG capsule    fluPHENAZine (PROLIXIN) 5 MG tablet    hydrOXYzine pamoate (VISTARIL) 50 MG capsule    mirtazapine (REMERON) 30 MG tablet    QUEtiapine (SEROquel) 50 MG tablet    Insomnia due to mental condition        Relevant Medications    ARIPiprazole ER (ABILIFY MAINTENA) 400 MG Suspension Reconstituted ER IM injection ER    DULoxetine (CYMBALTA) 60 MG capsule    fluPHENAZine (PROLIXIN) 5 MG tablet    hydrOXYzine pamoate (VISTARIL) 50 MG capsule    mirtazapine (REMERON) 30 MG tablet    QUEtiapine (SEROquel) 50 MG tablet      Diagnoses       Codes Comments    Schizoaffective disorder, unspecified type (CMS/HCC)    -  Primary ICD-10-CM: F25.9  ICD-9-CM: 295.70     Generalized anxiety disorder     ICD-10-CM: F41.1  ICD-9-CM: 300.02     Insomnia due to mental condition     ICD-10-CM: F51.05  ICD-9-CM: 300.9, 327.02         Discussed medication options.  Continue Abilify Maintena with next injection in 28 days.   continue seroquel to assist with sleep and mood, cymbalta for depression and anxiety, hydroxyzine for anxiety, mirtazepine for depression and sleep, and prolixin for thoughts. Reviewed the risks, benefits, and side effects of the medications; patient acknowledged and verbally consented.  Continue other prescribed medications as prescribed. Patient is agreeable to call the [facility] with any worsening of symptoms.  Patient is aware to call 911 or go to the nearest ER should begin having SI/HI.     Prognosis: Guarded dependent on medication, follow up appointment and treatment plan compliance     Functionality: Fair.Depression seems to be impacting her motivation and energy levels causing the needed for excessive sleep.     Treatment plan completed on 2/24/21    Return in 12 weeks

## 2021-02-24 NOTE — TREATMENT PLAN
Multi-Disciplinary Problems (from Behavioral Health Treatment Plan)    Active Problems     Problem: Anxiety  Start Date: 02/24/21    Problem Details: The patient self-scales this problem as a 6 with 10 being the worst.              Problem: Impaired Thinking  Start Date: 02/24/21    Problem Details: The patient self-scales this problem as a 3 with 10 being the worst.              Problem: Mood Instability  Start Date: 02/24/21    Problem Details: The patient self-scales this problem as a 3 with 10 being the worst.                           I have discussed and reviewed this treatment plan with the patient.  It has been printed for signatures.

## 2021-03-30 ENCOUNTER — HOSPITAL ENCOUNTER (EMERGENCY)
Facility: HOSPITAL | Age: 36
End: 2021-03-30

## 2021-04-28 NOTE — PROGRESS NOTES
Osorio Loredo is a 63 year old male presenting for right hip pain. Patient reports he has been having progressing pain for the last year. Patient has mostly groin pain. It started when sleeping but is now effecting his daily activities. 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 He is an avid cross country skier and does bike 5,000 miles a year.  He states naproxen 250 mg does help a little.  He did have a blood clot in May of 2019 from a fall on his right hip and was temporarily put on eloquis.    Medications reviewed and updated.  Body mass index is 22.75 kg/m².  PCP verified.  Local pharmacy verified.    Social History     Tobacco Use   Smoking Status Never Smoker   Smokeless Tobacco Never Used     ALLERGIES:   Allergen Reactions   • Bee SWELLING   • Cat Dander Other (See Comments)   • Dog Dander Other (See Comments)          Subjective   Cintia Issa is a 34 y.o. female is here today for medication management follow-up, she presents to her appointment on time at [facility].   She is accompanied by her mother with whom she gives permission to speak in front of her openly.     Chief Complaint: Follow up with psychosis    History of Present Illness She states that she has been doing alright, taking her medications as prescribed with no reported SE.  She states that she has been having a little trouble with waking up throughout the night, she states that she has been having more frequent NM that have interrupted her sleep.  She otherwise wakes up about every 2 hours, she is getting about 8 hours of sleep but it is interrupted.  She states that she has periods of times when it is difficult to go back to sleep.  She states that she takes melatonin and benadryl at times to help with her sleep especially with allergies.  She denies any symptoms of depression, she states that she continues to have anxiety but not as bad as it use to be.  She states that she got nervous and scared because she was by herself, rates it about 2/10 with 10 being the worse.  She states that her appetite has been ok but again noted to gain about 3 pounds since last visit, recommended healthy lifestyle.  She was able to get her license which has been a big accomplishment for her.  She denies any new health issues other than allergies.  She continues to have somebody stay with her.  Denies any AV hallucinations, denies any SI/HI.      The following portions of the patient's history were reviewed and updated as appropriate: allergies, current medications, past family history, past medical history, past social history, past surgical history and problem list.    Review of Systems   Constitutional: Negative for appetite change, chills, diaphoresis, fatigue, fever and unexpected weight change.   HENT: Negative for hearing loss, sore throat,  "trouble swallowing and voice change.    Eyes: Negative for photophobia and visual disturbance.   Respiratory: Negative for cough, chest tightness and shortness of breath.    Cardiovascular: Negative for chest pain and palpitations.   Gastrointestinal: Negative for abdominal pain, constipation, nausea and vomiting.   Endocrine: Negative for cold intolerance and heat intolerance.   Genitourinary: Negative for dysuria and frequency.   Musculoskeletal: Positive for arthralgias and back pain. Negative for joint swelling and neck stiffness.   Skin: Negative for color change and wound.   Allergic/Immunologic: Negative for environmental allergies and immunocompromised state.   Neurological: Negative for dizziness, tremors, seizures, syncope, weakness, light-headedness and headaches.   Hematological: Negative for adenopathy. Does not bruise/bleed easily.       Objective   Physical Exam   Constitutional: She appears well-developed and well-nourished. No distress.   Neurological: She is alert. Coordination and gait normal.   Vitals reviewed.    Blood pressure 134/86, pulse 97, height 167.6 cm (65.98\"), weight 80.2 kg (176 lb 14.4 oz).    Medication List:   Current Outpatient Medications   Medication Sig Dispense Refill   • ARIPiprazole ER (ABILIFY MAINTENA) 400 MG Suspension Reconstituted ER IM injection ER Inject 400 mg into the appropriate muscle as directed by prescriber Every 28 (Twenty-Eight) Days. 1 each 5   • DULoxetine (CYMBALTA) 60 MG capsule Take 1 capsule by mouth Daily. 30 capsule 2   • fluPHENAZine (PROLIXIN) 5 MG tablet Take 1 tablet by mouth Daily. 30 tablet 2   • hydrOXYzine pamoate (VISTARIL) 50 MG capsule Take 1 capsule by mouth 3 (Three) Times a Day As Needed for Anxiety. 90 capsule 2   • metoprolol tartrate (LOPRESSOR) 50 MG tablet Take 1 tablet by mouth Every 12 (Twelve) Hours. 60 tablet 0   • mirtazapine (REMERON) 30 MG tablet Take 1 tablet by mouth Every Night for 332 doses. 30 tablet 2   • topiramate " (TOPAMAX) 100 MG tablet Take 1 tablet by mouth 2 (Two) Times a Day. 60 tablet 2     Current Facility-Administered Medications   Medication Dose Route Frequency Provider Last Rate Last Dose   • ARIPiprazole ER (ABILIFY MAINTENA) IM injection  mg  400 mg Intramuscular Q28 Days Pete, Donna Carolyn, APRN   400 mg at 03/06/19 1610   • ARIPiprazole ER (ABILIFY MAINTENA) IM injection  mg  400 mg Intramuscular Q28 Days Pete, Donna Carolyn, APRN   400 mg at 04/03/19 1630   • ARIPiprazole ER (ABILIFY MAINTENA) IM injection  mg  400 mg Intramuscular Q28 Days Pete, Donna Carolyn, APRN   400 mg at 05/15/19 1155   • ARIPiprazole ER (ABILIFY MAINTENA) IM injection  mg  400 mg Intramuscular Q28 Days Pete, Donna Carolyn, APRN           Mental Status Exam:   Hygiene:   good  Cooperation:  Guarded  Eye Contact:  Fair  Psychomotor Behavior:  Appropriate  Affect:  Appropriate  Hopelessness: Denies  Speech:  Minimal  Thought Process:  Linear  Thought Content:  Mood congurent  Suicidal:  None  Homicidal:  None  Hallucinations:  None  Delusion:  None  Memory:  Intact  Orientation:  Person, Place, Time and Situation  Reliability:  fair  Insight:  Fair  Judgement:  Fair  Impulse Control:  Fair  Physical/Medical Issues:  No     Assessment/Plan   Problems Addressed this Visit        Other    Schizoaffective disorder (CMS/HCC) - Primary    Relevant Medications    ARIPiprazole ER (ABILIFY MAINTENA) 400 MG Suspension Reconstituted ER IM injection ER    DULoxetine (CYMBALTA) 60 MG capsule    fluPHENAZine (PROLIXIN) 5 MG tablet    hydrOXYzine pamoate (VISTARIL) 50 MG capsule    mirtazapine (REMERON) 30 MG tablet    ARIPiprazole ER (ABILIFY MAINTENA) IM injection  mg (Start on 7/10/2019 11:08 AM)      Other Visit Diagnoses     Generalized anxiety disorder        Relevant Medications    ARIPiprazole ER (ABILIFY MAINTENA) 400 MG Suspension Reconstituted ER IM injection ER    DULoxetine (CYMBALTA) 60 MG capsule     fluPHENAZine (PROLIXIN) 5 MG tablet    hydrOXYzine pamoate (VISTARIL) 50 MG capsule    mirtazapine (REMERON) 30 MG tablet    ARIPiprazole ER (ABILIFY MAINTENA) IM injection  mg (Start on 7/10/2019 11:08 AM)    Insomnia due to mental condition        Relevant Medications    ARIPiprazole ER (ABILIFY MAINTENA) 400 MG Suspension Reconstituted ER IM injection ER    DULoxetine (CYMBALTA) 60 MG capsule    fluPHENAZine (PROLIXIN) 5 MG tablet    hydrOXYzine pamoate (VISTARIL) 50 MG capsule    mirtazapine (REMERON) 30 MG tablet    ARIPiprazole ER (ABILIFY MAINTENA) IM injection  mg (Start on 7/10/2019 11:08 AM)        Discussed medication options.  Continue Abilify Maintena with next injection in 28 days.  Reviewed the risks, benefits, and side effects of the medications; patient acknowledged and verbally consented.  Continue other prescribed medications as prescribed. Patient is agreeable to call the [facility].  Patient is aware to call 911 or go to the nearest ER should begin having SI/HI.     Prognosis: Guarded dependent on medication, follow up appointment and treatment plan compliance     Functionality: Fair.Depression seems to be impacting her motivation and energy levels causing the needed for excessive sleep.     In 1040am Out 1110 am of which 30 min spent for supportive psychotherapy discussing coordination of care, and counseling the patient regarding   Answered any questions patient had with medication and plan. Reviewed sleep hygiene skills and recommended that she avoid caffeine in the evening, also to avoid exercise during the evening as it can trigger an increase in her metabolism. Also, reviewed healthy lifestyle with appropriate foods, need for exercise and activity during the day.        Return in 8 weeks

## 2021-05-10 ENCOUNTER — CLINICAL SUPPORT (OUTPATIENT)
Dept: FAMILY MEDICINE CLINIC | Facility: CLINIC | Age: 36
End: 2021-05-10

## 2021-05-10 DIAGNOSIS — F25.9 SCHIZOAFFECTIVE DISORDER, UNSPECIFIED TYPE (HCC): ICD-10-CM

## 2021-05-10 DIAGNOSIS — F22 PARANOID BEHAVIOR (HCC): ICD-10-CM

## 2021-05-10 PROCEDURE — 96372 THER/PROPH/DIAG INJ SC/IM: CPT | Performed by: NURSE PRACTITIONER

## 2021-09-08 ENCOUNTER — CLINICAL SUPPORT (OUTPATIENT)
Dept: FAMILY MEDICINE CLINIC | Facility: CLINIC | Age: 36
End: 2021-09-08

## 2021-09-08 DIAGNOSIS — F25.9 SCHIZOAFFECTIVE DISORDER, UNSPECIFIED TYPE (HCC): ICD-10-CM

## 2021-09-08 PROCEDURE — 96372 THER/PROPH/DIAG INJ SC/IM: CPT | Performed by: NURSE PRACTITIONER

## 2021-10-06 ENCOUNTER — CLINICAL SUPPORT (OUTPATIENT)
Dept: FAMILY MEDICINE CLINIC | Facility: CLINIC | Age: 36
End: 2021-10-06

## 2021-10-06 DIAGNOSIS — F25.9 SCHIZOAFFECTIVE DISORDER, UNSPECIFIED TYPE (HCC): ICD-10-CM

## 2021-10-06 PROCEDURE — 96372 THER/PROPH/DIAG INJ SC/IM: CPT | Performed by: NURSE PRACTITIONER

## 2022-07-21 ENCOUNTER — APPOINTMENT (OUTPATIENT)
Dept: ULTRASOUND IMAGING | Facility: HOSPITAL | Age: 37
End: 2022-07-21

## 2022-07-21 ENCOUNTER — HOSPITAL ENCOUNTER (EMERGENCY)
Facility: HOSPITAL | Age: 37
Discharge: HOME | End: 2022-07-21
Attending: STUDENT IN AN ORGANIZED HEALTH CARE EDUCATION/TRAINING PROGRAM | Admitting: STUDENT IN AN ORGANIZED HEALTH CARE EDUCATION/TRAINING PROGRAM

## 2022-07-21 ENCOUNTER — APPOINTMENT (OUTPATIENT)
Dept: GENERAL RADIOLOGY | Facility: HOSPITAL | Age: 37
End: 2022-07-21

## 2022-07-21 VITALS
RESPIRATION RATE: 18 BRPM | SYSTOLIC BLOOD PRESSURE: 125 MMHG | OXYGEN SATURATION: 98 % | HEIGHT: 66 IN | WEIGHT: 170 LBS | HEART RATE: 78 BPM | DIASTOLIC BLOOD PRESSURE: 83 MMHG | TEMPERATURE: 98.7 F | BODY MASS INDEX: 27.32 KG/M2

## 2022-07-21 DIAGNOSIS — L03.119 CELLULITIS OF FOOT: Primary | ICD-10-CM

## 2022-07-21 DIAGNOSIS — S96.919A SPRAIN AND STRAIN OF ANKLE: ICD-10-CM

## 2022-07-21 DIAGNOSIS — S93.409A SPRAIN AND STRAIN OF ANKLE: ICD-10-CM

## 2022-07-21 PROCEDURE — 93971 EXTREMITY STUDY: CPT

## 2022-07-21 PROCEDURE — 73630 X-RAY EXAM OF FOOT: CPT | Performed by: RADIOLOGY

## 2022-07-21 PROCEDURE — 73610 X-RAY EXAM OF ANKLE: CPT

## 2022-07-21 PROCEDURE — 93971 EXTREMITY STUDY: CPT | Performed by: RADIOLOGY

## 2022-07-21 PROCEDURE — 73590 X-RAY EXAM OF LOWER LEG: CPT | Performed by: RADIOLOGY

## 2022-07-21 PROCEDURE — 73630 X-RAY EXAM OF FOOT: CPT

## 2022-07-21 PROCEDURE — 99284 EMERGENCY DEPT VISIT MOD MDM: CPT

## 2022-07-21 PROCEDURE — 73610 X-RAY EXAM OF ANKLE: CPT | Performed by: RADIOLOGY

## 2022-07-21 PROCEDURE — 99283 EMERGENCY DEPT VISIT LOW MDM: CPT

## 2022-07-21 PROCEDURE — 73590 X-RAY EXAM OF LOWER LEG: CPT

## 2022-07-21 RX ORDER — SULFAMETHOXAZOLE AND TRIMETHOPRIM 800; 160 MG/1; MG/1
1 TABLET ORAL 2 TIMES DAILY
Qty: 20 TABLET | Refills: 0 | Status: ON HOLD | OUTPATIENT
Start: 2022-07-21 | End: 2022-08-02

## 2022-07-21 RX ORDER — SULFAMETHOXAZOLE AND TRIMETHOPRIM 800; 160 MG/1; MG/1
1 TABLET ORAL ONCE
Status: COMPLETED | OUTPATIENT
Start: 2022-07-21 | End: 2022-07-21

## 2022-07-21 RX ADMIN — SULFAMETHOXAZOLE AND TRIMETHOPRIM 1 TABLET: 800; 160 TABLET ORAL at 19:22

## 2022-07-21 NOTE — NURSING NOTE
Pt states she is not here for psychiatric care, pt is adamant she does not need psychiatric care, states she is here for the edema in her right foot.  There is noted edema on her right foot that pt states she has been experiencing for the last two hours.  Pt wants to be placed in a medical bed.  I have notified ED lead RN and she has given the patient a medical bed assignment.

## 2022-07-21 NOTE — DISCHARGE INSTRUCTIONS
You likely have sprained your ankle however due to concern for infection of your big toe we will treat with antibiotics. Please follow-up with your primary care physician and 2 to 3 days for further management.  Please take antibiotic as prescribed.  Please utilize ice to help with swelling along with ibuprofen every 6 hours.  Please keep your feet elevated at night.  Please return for any concerning signs of worsening infection such as fever, worsening swelling, purulent drainage, discoloration of the skin or any other concerns.

## 2022-07-22 NOTE — ED PROVIDER NOTES
Subjective   History of Present Illness     Mrs. Bacon is a 37-year-old female with no significant past medical history presenting to the emergency department for acute swelling of her right foot over the last 24 hours.  Patient denies any chest pain, dyspnea.  No lower extremity pain.  No history of blood clots or recent travel history.  No blood thinners.  Patient denies any trauma to the right foot.  No insect bites noted.  Denies IV drug use.  No fevers, chills, nausea or vomiting or any other infectious-like symptoms.    Review of Systems   Constitutional: Negative.  Negative for fever.   HENT: Negative.    Respiratory: Negative.    Cardiovascular: Negative.  Negative for chest pain.   Gastrointestinal: Negative.  Negative for abdominal pain.   Endocrine: Negative.    Genitourinary: Negative.  Negative for dysuria.   Musculoskeletal:        Right lower extremity swelling   Skin: Negative.    Neurological: Negative.    Psychiatric/Behavioral: Negative.    All other systems reviewed and are negative.      Past Medical History:   Diagnosis Date   • Anxiety    • Arthritis    • Bronchitis    • Chronic back pain    • Fibromyalgia    • Hypertension    • MDD (major depressive disorder), recurrent, severe, with psychosis ([facility]) 6/9/2018   • Panic disorder    • Psychiatric illness    • Schizoaffective disorder ([facility])    • Schizophrenia, paranoid ([facility])        Allergies   Allergen Reactions   • Elavil [Amitriptyline] Nausea Only       Past Surgical History:   Procedure Laterality Date   • WISDOM TOOTH EXTRACTION         Family History   Problem Relation Age of Onset   • Arthritis Mother    • Stroke Father    • COPD Brother    • Diabetes Maternal Grandmother    • COPD Maternal Grandfather    • Heart disease Maternal Grandfather    • Stroke Maternal Grandfather    • Anxiety disorder Neg Hx    • Depression Neg Hx    • Schizophrenia Neg Hx        Social History     Socioeconomic History   • Marital status: Single   Tobacco Use  "  • Smoking status: Current Every Day Smoker     Packs/day: 1.00     Years: 5.00     Pack years: 5.00     Types: Cigarettes   • Smokeless tobacco: Never Used   Substance and Sexual Activity   • Alcohol use: No   • Drug use: No     Comment: denies   • Sexual activity: Not Currently     Comment: reports she hasn't been in \"forever.\"            Objective   Physical Exam  Vitals and nursing note reviewed.   Constitutional:       General: She is not in acute distress.     Appearance: She is not ill-appearing.   HENT:      Head: Normocephalic.      Right Ear: Tympanic membrane normal.      Left Ear: Tympanic membrane normal.      Nose: No congestion or rhinorrhea.      Mouth/Throat:      Mouth: Mucous membranes are moist.   Eyes:      Extraocular Movements: Extraocular movements intact.      Pupils: Pupils are equal, round, and reactive to light.   Cardiovascular:      Rate and Rhythm: Normal rate and regular rhythm.      Pulses: Normal pulses.      Heart sounds: Normal heart sounds.   Pulmonary:      Effort: Pulmonary effort is normal.      Breath sounds: Normal breath sounds.   Abdominal:      General: Abdomen is flat. Bowel sounds are normal.   Musculoskeletal:         General: Swelling present. No tenderness.      Cervical back: Normal range of motion.      Comments: Swelling of the right foot extending just above the ankle.  Neurovascularly intact.  No significant erythema noted.  Small punctate lesion along the great toe.  No discoloration of the skin or black necrotic areas.  No crepitus.   Skin:     General: Skin is warm.      Capillary Refill: Capillary refill takes less than 2 seconds.   Neurological:      General: No focal deficit present.      Mental Status: She is alert and oriented to person, place, and time.         Procedures           ED Course                                           MDM  Number of Diagnoses or Management Options  Cellulitis of foot  Sprain and strain of ankle  Diagnosis management " comments: Mrs. Dozier is a 37-year-old female presenting to the emergency department for acute onset swelling of the right foot and foreleg.  Patient is afebrile and hemodynamically stable on arrival.  On physical exam patient has moderate swelling of the right foot extending just above the ankle.  No black necrotic areas noted.  No crepitus.  No significant erythema.  Small punctate lesion along the great toe.  Bedside x-ray of the foot, ankle and tib-fib are obtained results show evidence of cellulitis with no bony involvement.  No gas formation.  DVT ultrasound of the right leg is negative for DVT.  Patient's symptoms consistent with early cellulitis versus a sprain.  Patient is given antibiotic for outpatient management and instructed to follow-up with primary care physician in 2 to 3 days for further management.  Patient instructed to return if she notices worsening swelling/leg, discoloration of the skin, worsening pain or any other concerns.  Patient is discharged in stable condition.  Of note patient given a dose of Bactrim prior to discharge.       Amount and/or Complexity of Data Reviewed  Tests in the radiology section of CPT®: reviewed and ordered        Final diagnoses:   Cellulitis of foot   Sprain and strain of ankle       ED Disposition  ED Disposition     ED Disposition   Discharge    Condition   Stable    Comment   --             Cha Beckham, APRN  [address] [place] [id number] [phone]    Go in 2 days  Medical evaluation         Medication List      New Prescriptions    sulfamethoxazole-trimethoprim 800-160 MG per tablet  Commonly known as: BACTRIM DS,SEPTRA DS  Take 1 tablet by mouth 2 (Two) Times a Day for 10 days.           Where to Get Your Medications      These [place] [facility] [place] [address] [place] [address] - [phone] [facility] [phone] [address] [place] [address]    Phone: [phone]   · sulfamethoxazole-trimethoprim 800-160 MG  per tablet          Jaky Howell MD  07/22/22 1921

## 2022-07-23 ENCOUNTER — APPOINTMENT (OUTPATIENT)
Dept: GENERAL RADIOLOGY | Facility: HOSPITAL | Age: 37
End: 2022-07-23

## 2022-07-23 ENCOUNTER — HOSPITAL ENCOUNTER (EMERGENCY)
Facility: HOSPITAL | Age: 37
Discharge: LEFT BEFORE DISCHARGE | End: 2022-07-23
Admitting: STUDENT IN AN ORGANIZED HEALTH CARE EDUCATION/TRAINING PROGRAM

## 2022-07-23 VITALS
BODY MASS INDEX: 27.32 KG/M2 | HEART RATE: 114 BPM | HEIGHT: 66 IN | TEMPERATURE: 98.9 F | SYSTOLIC BLOOD PRESSURE: 144 MMHG | DIASTOLIC BLOOD PRESSURE: 87 MMHG | RESPIRATION RATE: 18 BRPM | OXYGEN SATURATION: 100 % | WEIGHT: 170 LBS

## 2022-07-23 PROCEDURE — 99281 EMR DPT VST MAYX REQ PHY/QHP: CPT

## 2022-07-23 PROCEDURE — 71045 X-RAY EXAM CHEST 1 VIEW: CPT

## 2022-07-24 NOTE — ED NOTES
"Patient approached Triage Desk, tells  \"I'm not going to wait any longer.\" Patient declines to wait for Triage Nurse to sign AMA documentation. Patient not available to sign AMA document. Notified Provider and Lead RN. No iv access established during this hospital visit.   "

## 2022-07-24 NOTE — ED NOTES
MEDICAL SCREENING:    Reason for Visit: Patient complains of swelling, generalized weakness, and dizziness.    Patient initially seen in triage.  The patient was advised further evaluation and diagnostic testing will be needed, some of the treatment and testing will be initiated in the lobby in order to begin the process.  The patient will be returned to the waiting area for the time being and possibly be re-assessed by a subsequent ED provider.  The patient will be brought back to the treatment area in as timely manner as possible.         Lindsey Gonzalez PA-C  07/23/22 1376

## 2022-07-28 ENCOUNTER — APPOINTMENT (OUTPATIENT)
Dept: ULTRASOUND IMAGING | Facility: HOSPITAL | Age: 37
End: 2022-07-28

## 2022-07-28 ENCOUNTER — HOSPITAL ENCOUNTER (EMERGENCY)
Facility: HOSPITAL | Age: 37
Discharge: HOME | End: 2022-07-28
Attending: STUDENT IN AN ORGANIZED HEALTH CARE EDUCATION/TRAINING PROGRAM | Admitting: STUDENT IN AN ORGANIZED HEALTH CARE EDUCATION/TRAINING PROGRAM

## 2022-07-28 ENCOUNTER — APPOINTMENT (OUTPATIENT)
Dept: GENERAL RADIOLOGY | Facility: HOSPITAL | Age: 37
End: 2022-07-28

## 2022-07-28 VITALS
HEIGHT: 66 IN | HEART RATE: 79 BPM | OXYGEN SATURATION: 99 % | TEMPERATURE: 98.6 F | SYSTOLIC BLOOD PRESSURE: 135 MMHG | WEIGHT: 170 LBS | BODY MASS INDEX: 27.32 KG/M2 | DIASTOLIC BLOOD PRESSURE: 88 MMHG | RESPIRATION RATE: 18 BRPM

## 2022-07-28 DIAGNOSIS — Z13.30 ENCOUNTER FOR BEHAVIORAL HEALTH SCREENING: Primary | ICD-10-CM

## 2022-07-28 LAB
ALBUMIN SERPL-MCNC: 4.49 G/DL (ref 3.5–5.2)
ALBUMIN/GLOB SERPL: 1.4 G/DL
ALP SERPL-CCNC: 98 U/L (ref 39–117)
ALT SERPL W P-5'-P-CCNC: 14 U/L (ref 1–33)
AMPHET+METHAMPHET UR QL: POSITIVE
AMPHETAMINES UR QL: NEGATIVE
ANION GAP SERPL CALCULATED.3IONS-SCNC: 11.6 MMOL/L (ref 5–15)
AST SERPL-CCNC: 14 U/L (ref 1–32)
BACTERIA UR QL AUTO: ABNORMAL /HPF
BARBITURATES UR QL SCN: NEGATIVE
BASOPHILS # BLD AUTO: 0.03 10*3/MM3 (ref 0–0.2)
BASOPHILS NFR BLD AUTO: 0.3 % (ref 0–1.5)
BENZODIAZ UR QL SCN: NEGATIVE
BILIRUB SERPL-MCNC: 0.2 MG/DL (ref 0–1.2)
BILIRUB UR QL STRIP: NEGATIVE
BUN SERPL-MCNC: 4 MG/DL (ref 6–20)
BUN/CREAT SERPL: 4.5 (ref 7–25)
BUPRENORPHINE SERPL-MCNC: POSITIVE NG/ML
CALCIUM SPEC-SCNC: 10 MG/DL (ref 8.6–10.5)
CANNABINOIDS SERPL QL: NEGATIVE
CHLORIDE SERPL-SCNC: 105 MMOL/L (ref 98–107)
CLARITY UR: CLEAR
CO2 SERPL-SCNC: 22.4 MMOL/L (ref 22–29)
COCAINE UR QL: NEGATIVE
COLOR UR: YELLOW
CREAT SERPL-MCNC: 0.88 MG/DL (ref 0.57–1)
DEPRECATED RDW RBC AUTO: 41.6 FL (ref 37–54)
EGFRCR SERPLBLD CKD-EPI 2021: 85.9 ML/MIN/1.73
EOSINOPHIL # BLD AUTO: 0.08 10*3/MM3 (ref 0–0.4)
EOSINOPHIL NFR BLD AUTO: 0.9 % (ref 0.3–6.2)
ERYTHROCYTE [DISTWIDTH] IN BLOOD BY AUTOMATED COUNT: 13.3 % (ref 12.3–15.4)
ETHANOL BLD-MCNC: <10 MG/DL (ref 0–10)
ETHANOL UR QL: <0.01 %
FLUAV RNA RESP QL NAA+PROBE: NOT DETECTED
FLUBV RNA RESP QL NAA+PROBE: NOT DETECTED
GLOBULIN UR ELPH-MCNC: 3.3 GM/DL
GLUCOSE SERPL-MCNC: 102 MG/DL (ref 65–99)
GLUCOSE UR STRIP-MCNC: NEGATIVE MG/DL
HCT VFR BLD AUTO: 40 % (ref 34–46.6)
HGB BLD-MCNC: 13.1 G/DL (ref 12–15.9)
HGB UR QL STRIP.AUTO: NEGATIVE
HOLD SPECIMEN: NORMAL
HOLD SPECIMEN: NORMAL
HYALINE CASTS UR QL AUTO: ABNORMAL /LPF
IMM GRANULOCYTES # BLD AUTO: 0.02 10*3/MM3 (ref 0–0.05)
IMM GRANULOCYTES NFR BLD AUTO: 0.2 % (ref 0–0.5)
KETONES UR QL STRIP: NEGATIVE
LEUKOCYTE ESTERASE UR QL STRIP.AUTO: ABNORMAL
LYMPHOCYTES # BLD AUTO: 2.8 10*3/MM3 (ref 0.7–3.1)
LYMPHOCYTES NFR BLD AUTO: 31.8 % (ref 19.6–45.3)
MAGNESIUM SERPL-MCNC: 2.1 MG/DL (ref 1.6–2.6)
MCH RBC QN AUTO: 28.1 PG (ref 26.6–33)
MCHC RBC AUTO-ENTMCNC: 32.8 G/DL (ref 31.5–35.7)
MCV RBC AUTO: 85.8 FL (ref 79–97)
METHADONE UR QL SCN: NEGATIVE
MONOCYTES # BLD AUTO: 0.51 10*3/MM3 (ref 0.1–0.9)
MONOCYTES NFR BLD AUTO: 5.8 % (ref 5–12)
NEUTROPHILS NFR BLD AUTO: 5.37 10*3/MM3 (ref 1.7–7)
NEUTROPHILS NFR BLD AUTO: 61 % (ref 42.7–76)
NITRITE UR QL STRIP: NEGATIVE
NRBC BLD AUTO-RTO: 0 /100 WBC (ref 0–0.2)
NT-PROBNP SERPL-MCNC: 12.5 PG/ML (ref 0–450)
OPIATES UR QL: NEGATIVE
OXYCODONE UR QL SCN: NEGATIVE
PCP UR QL SCN: NEGATIVE
PH UR STRIP.AUTO: 7 [PH] (ref 5–8)
PLATELET # BLD AUTO: 323 10*3/MM3 (ref 140–450)
PMV BLD AUTO: 9.7 FL (ref 6–12)
POTASSIUM SERPL-SCNC: 3.4 MMOL/L (ref 3.5–5.2)
PROPOXYPH UR QL: NEGATIVE
PROT SERPL-MCNC: 7.8 G/DL (ref 6–8.5)
PROT UR QL STRIP: NEGATIVE
RBC # BLD AUTO: 4.66 10*6/MM3 (ref 3.77–5.28)
RBC # UR STRIP: ABNORMAL /HPF
REF LAB TEST METHOD: ABNORMAL
SARS-COV-2 RNA RESP QL NAA+PROBE: NOT DETECTED
SODIUM SERPL-SCNC: 139 MMOL/L (ref 136–145)
SP GR UR STRIP: <=1.005 (ref 1–1.03)
SQUAMOUS #/AREA URNS HPF: ABNORMAL /HPF
TRICYCLICS UR QL SCN: NEGATIVE
TROPONIN T SERPL-MCNC: <0.01 NG/ML (ref 0–0.03)
TSH SERPL DL<=0.05 MIU/L-ACNC: 1.05 UIU/ML (ref 0.27–4.2)
UROBILINOGEN UR QL STRIP: ABNORMAL
WBC # UR STRIP: ABNORMAL /HPF
WBC NRBC COR # BLD: 8.81 10*3/MM3 (ref 3.4–10.8)
WHOLE BLOOD HOLD COAG: NORMAL
WHOLE BLOOD HOLD SPECIMEN: NORMAL

## 2022-07-28 PROCEDURE — 36415 COLL VENOUS BLD VENIPUNCTURE: CPT

## 2022-07-28 PROCEDURE — 80053 COMPREHEN METABOLIC PANEL: CPT | Performed by: PHYSICIAN ASSISTANT

## 2022-07-28 PROCEDURE — 93970 EXTREMITY STUDY: CPT

## 2022-07-28 PROCEDURE — 71045 X-RAY EXAM CHEST 1 VIEW: CPT

## 2022-07-28 PROCEDURE — 80306 DRUG TEST PRSMV INSTRMNT: CPT | Performed by: PHYSICIAN ASSISTANT

## 2022-07-28 PROCEDURE — 85025 COMPLETE CBC W/AUTO DIFF WBC: CPT | Performed by: PHYSICIAN ASSISTANT

## 2022-07-28 PROCEDURE — 83735 ASSAY OF MAGNESIUM: CPT | Performed by: PHYSICIAN ASSISTANT

## 2022-07-28 PROCEDURE — 71045 X-RAY EXAM CHEST 1 VIEW: CPT | Performed by: RADIOLOGY

## 2022-07-28 PROCEDURE — 83880 ASSAY OF NATRIURETIC PEPTIDE: CPT | Performed by: PHYSICIAN ASSISTANT

## 2022-07-28 PROCEDURE — 84443 ASSAY THYROID STIM HORMONE: CPT | Performed by: PHYSICIAN ASSISTANT

## 2022-07-28 PROCEDURE — 82077 ASSAY SPEC XCP UR&BREATH IA: CPT | Performed by: PHYSICIAN ASSISTANT

## 2022-07-28 PROCEDURE — 93010 ELECTROCARDIOGRAM REPORT: CPT | Performed by: INTERNAL MEDICINE

## 2022-07-28 PROCEDURE — 99284 EMERGENCY DEPT VISIT MOD MDM: CPT

## 2022-07-28 PROCEDURE — 81001 URINALYSIS AUTO W/SCOPE: CPT | Performed by: PHYSICIAN ASSISTANT

## 2022-07-28 PROCEDURE — 87636 SARSCOV2 & INF A&B AMP PRB: CPT | Performed by: STUDENT IN AN ORGANIZED HEALTH CARE EDUCATION/TRAINING PROGRAM

## 2022-07-28 PROCEDURE — 84484 ASSAY OF TROPONIN QUANT: CPT | Performed by: PHYSICIAN ASSISTANT

## 2022-07-28 PROCEDURE — 93005 ELECTROCARDIOGRAM TRACING: CPT | Performed by: PHYSICIAN ASSISTANT

## 2022-07-29 LAB
QT INTERVAL: 392 MS
QTC INTERVAL: 431 MS

## 2022-08-01 ENCOUNTER — HOSPITAL ENCOUNTER (INPATIENT)
Facility: HOSPITAL | Age: 37
LOS: 3 days | Discharge: HOME | End: 2022-08-04
Attending: STUDENT IN AN ORGANIZED HEALTH CARE EDUCATION/TRAINING PROGRAM | Admitting: STUDENT IN AN ORGANIZED HEALTH CARE EDUCATION/TRAINING PROGRAM

## 2022-08-01 ENCOUNTER — HOSPITAL ENCOUNTER (EMERGENCY)
Facility: HOSPITAL | Age: 37
Discharge: PSYCHIATRIC OR SUBSTANCE USE FACILITY | End: 2022-08-01
Attending: EMERGENCY MEDICINE | Admitting: EMERGENCY MEDICINE

## 2022-08-01 VITALS
HEIGHT: 64 IN | BODY MASS INDEX: 24.75 KG/M2 | HEART RATE: 100 BPM | TEMPERATURE: 96.2 F | WEIGHT: 145 LBS | RESPIRATION RATE: 18 BRPM | OXYGEN SATURATION: 98 % | SYSTOLIC BLOOD PRESSURE: 122 MMHG | DIASTOLIC BLOOD PRESSURE: 72 MMHG

## 2022-08-01 DIAGNOSIS — F25.9 SCHIZOAFFECTIVE DISORDER, UNSPECIFIED TYPE: Primary | ICD-10-CM

## 2022-08-01 PROBLEM — F29 PSYCHOSIS: Status: ACTIVE | Noted: 2022-08-01

## 2022-08-01 LAB
ALBUMIN SERPL-MCNC: 4.29 G/DL (ref 3.5–5.2)
ALBUMIN/GLOB SERPL: 1.4 G/DL
ALP SERPL-CCNC: 103 U/L (ref 39–117)
ALT SERPL W P-5'-P-CCNC: 13 U/L (ref 1–33)
AMPHET+METHAMPHET UR QL: NEGATIVE
AMPHETAMINES UR QL: NEGATIVE
ANION GAP SERPL CALCULATED.3IONS-SCNC: 12.7 MMOL/L (ref 5–15)
AST SERPL-CCNC: 20 U/L (ref 1–32)
B-HCG UR QL: NEGATIVE
BACTERIA UR QL AUTO: ABNORMAL /HPF
BARBITURATES UR QL SCN: NEGATIVE
BASOPHILS # BLD AUTO: 0.02 10*3/MM3 (ref 0–0.2)
BASOPHILS NFR BLD AUTO: 0.2 % (ref 0–1.5)
BENZODIAZ UR QL SCN: NEGATIVE
BILIRUB SERPL-MCNC: 0.3 MG/DL (ref 0–1.2)
BILIRUB UR QL STRIP: NEGATIVE
BUN SERPL-MCNC: 7 MG/DL (ref 6–20)
BUN/CREAT SERPL: 8 (ref 7–25)
BUPRENORPHINE SERPL-MCNC: NEGATIVE NG/ML
CALCIUM SPEC-SCNC: 9.5 MG/DL (ref 8.6–10.5)
CANNABINOIDS SERPL QL: NEGATIVE
CHLORIDE SERPL-SCNC: 101 MMOL/L (ref 98–107)
CLARITY UR: CLEAR
CO2 SERPL-SCNC: 24.3 MMOL/L (ref 22–29)
COCAINE UR QL: NEGATIVE
COLOR UR: YELLOW
CREAT SERPL-MCNC: 0.88 MG/DL (ref 0.57–1)
DEPRECATED RDW RBC AUTO: 41.4 FL (ref 37–54)
EGFRCR SERPLBLD CKD-EPI 2021: 85.9 ML/MIN/1.73
EOSINOPHIL # BLD AUTO: 0.11 10*3/MM3 (ref 0–0.4)
EOSINOPHIL NFR BLD AUTO: 1.1 % (ref 0.3–6.2)
ERYTHROCYTE [DISTWIDTH] IN BLOOD BY AUTOMATED COUNT: 13.3 % (ref 12.3–15.4)
ETHANOL BLD-MCNC: <10 MG/DL (ref 0–10)
ETHANOL UR QL: <0.01 %
FLUAV SUBTYP SPEC NAA+PROBE: NOT DETECTED
FLUBV RNA ISLT QL NAA+PROBE: NOT DETECTED
GLOBULIN UR ELPH-MCNC: 3 GM/DL
GLUCOSE SERPL-MCNC: 93 MG/DL (ref 65–99)
GLUCOSE UR STRIP-MCNC: NEGATIVE MG/DL
HCT VFR BLD AUTO: 35.7 % (ref 34–46.6)
HGB BLD-MCNC: 12.2 G/DL (ref 12–15.9)
HGB UR QL STRIP.AUTO: NEGATIVE
HYALINE CASTS UR QL AUTO: ABNORMAL /LPF
IMM GRANULOCYTES # BLD AUTO: 0.02 10*3/MM3 (ref 0–0.05)
IMM GRANULOCYTES NFR BLD AUTO: 0.2 % (ref 0–0.5)
KETONES UR QL STRIP: NEGATIVE
LEUKOCYTE ESTERASE UR QL STRIP.AUTO: ABNORMAL
LYMPHOCYTES # BLD AUTO: 2.61 10*3/MM3 (ref 0.7–3.1)
LYMPHOCYTES NFR BLD AUTO: 27.1 % (ref 19.6–45.3)
MAGNESIUM SERPL-MCNC: 1.9 MG/DL (ref 1.6–2.6)
MCH RBC QN AUTO: 29.3 PG (ref 26.6–33)
MCHC RBC AUTO-ENTMCNC: 34.2 G/DL (ref 31.5–35.7)
MCV RBC AUTO: 85.6 FL (ref 79–97)
METHADONE UR QL SCN: NEGATIVE
MONOCYTES # BLD AUTO: 0.71 10*3/MM3 (ref 0.1–0.9)
MONOCYTES NFR BLD AUTO: 7.4 % (ref 5–12)
NEUTROPHILS NFR BLD AUTO: 6.17 10*3/MM3 (ref 1.7–7)
NEUTROPHILS NFR BLD AUTO: 64 % (ref 42.7–76)
NITRITE UR QL STRIP: NEGATIVE
NRBC BLD AUTO-RTO: 0 /100 WBC (ref 0–0.2)
OPIATES UR QL: NEGATIVE
OXYCODONE UR QL SCN: NEGATIVE
PCP UR QL SCN: NEGATIVE
PH UR STRIP.AUTO: 7 [PH] (ref 5–8)
PLATELET # BLD AUTO: 341 10*3/MM3 (ref 140–450)
PMV BLD AUTO: 9.2 FL (ref 6–12)
POTASSIUM SERPL-SCNC: 3.1 MMOL/L (ref 3.5–5.2)
PROPOXYPH UR QL: NEGATIVE
PROT SERPL-MCNC: 7.3 G/DL (ref 6–8.5)
PROT UR QL STRIP: NEGATIVE
RBC # BLD AUTO: 4.17 10*6/MM3 (ref 3.77–5.28)
RBC # UR STRIP: ABNORMAL /HPF
REF LAB TEST METHOD: ABNORMAL
SARS-COV-2 RNA PNL SPEC NAA+PROBE: NOT DETECTED
SODIUM SERPL-SCNC: 138 MMOL/L (ref 136–145)
SP GR UR STRIP: <=1.005 (ref 1–1.03)
SQUAMOUS #/AREA URNS HPF: ABNORMAL /HPF
TRICYCLICS UR QL SCN: NEGATIVE
UROBILINOGEN UR QL STRIP: ABNORMAL
WBC # UR STRIP: ABNORMAL /HPF
WBC NRBC COR # BLD: 9.64 10*3/MM3 (ref 3.4–10.8)

## 2022-08-01 PROCEDURE — 85025 COMPLETE CBC W/AUTO DIFF WBC: CPT | Performed by: PHYSICIAN ASSISTANT

## 2022-08-01 PROCEDURE — 99284 EMERGENCY DEPT VISIT MOD MDM: CPT

## 2022-08-01 PROCEDURE — 81001 URINALYSIS AUTO W/SCOPE: CPT | Performed by: PHYSICIAN ASSISTANT

## 2022-08-01 PROCEDURE — 82077 ASSAY SPEC XCP UR&BREATH IA: CPT | Performed by: PHYSICIAN ASSISTANT

## 2022-08-01 PROCEDURE — 36415 COLL VENOUS BLD VENIPUNCTURE: CPT

## 2022-08-01 PROCEDURE — C9803 HOPD COVID-19 SPEC COLLECT: HCPCS | Performed by: PHYSICIAN ASSISTANT

## 2022-08-01 PROCEDURE — 93005 ELECTROCARDIOGRAM TRACING: CPT | Performed by: STUDENT IN AN ORGANIZED HEALTH CARE EDUCATION/TRAINING PROGRAM

## 2022-08-01 PROCEDURE — 83735 ASSAY OF MAGNESIUM: CPT | Performed by: PHYSICIAN ASSISTANT

## 2022-08-01 PROCEDURE — 80053 COMPREHEN METABOLIC PANEL: CPT | Performed by: PHYSICIAN ASSISTANT

## 2022-08-01 PROCEDURE — 87636 SARSCOV2 & INF A&B AMP PRB: CPT | Performed by: PHYSICIAN ASSISTANT

## 2022-08-01 PROCEDURE — 81025 URINE PREGNANCY TEST: CPT | Performed by: PHYSICIAN ASSISTANT

## 2022-08-01 PROCEDURE — 80306 DRUG TEST PRSMV INSTRMNT: CPT | Performed by: PHYSICIAN ASSISTANT

## 2022-08-01 RX ORDER — FAMOTIDINE 20 MG/1
20 TABLET, FILM COATED ORAL 2 TIMES DAILY PRN
Status: DISCONTINUED | OUTPATIENT
Start: 2022-08-01 | End: 2022-08-04 | Stop reason: HOSPADM

## 2022-08-01 RX ORDER — NICOTINE 21 MG/24HR
1 PATCH, TRANSDERMAL 24 HOURS TRANSDERMAL
Status: DISCONTINUED | OUTPATIENT
Start: 2022-08-02 | End: 2022-08-04 | Stop reason: HOSPADM

## 2022-08-01 RX ORDER — BENZONATATE 100 MG/1
100 CAPSULE ORAL 3 TIMES DAILY PRN
Status: DISCONTINUED | OUTPATIENT
Start: 2022-08-01 | End: 2022-08-04 | Stop reason: HOSPADM

## 2022-08-01 RX ORDER — ALUMINA, MAGNESIA, AND SIMETHICONE 2400; 2400; 240 MG/30ML; MG/30ML; MG/30ML
15 SUSPENSION ORAL EVERY 6 HOURS PRN
Status: DISCONTINUED | OUTPATIENT
Start: 2022-08-01 | End: 2022-08-04 | Stop reason: HOSPADM

## 2022-08-01 RX ORDER — ONDANSETRON 4 MG/1
4 TABLET, FILM COATED ORAL EVERY 6 HOURS PRN
Status: DISCONTINUED | OUTPATIENT
Start: 2022-08-01 | End: 2022-08-04 | Stop reason: HOSPADM

## 2022-08-01 RX ORDER — BENZTROPINE MESYLATE 1 MG/ML
1 INJECTION INTRAMUSCULAR; INTRAVENOUS ONCE AS NEEDED
Status: DISCONTINUED | OUTPATIENT
Start: 2022-08-01 | End: 2022-08-04 | Stop reason: HOSPADM

## 2022-08-01 RX ORDER — HYDROXYZINE 50 MG/1
50 TABLET, FILM COATED ORAL EVERY 6 HOURS PRN
Status: DISCONTINUED | OUTPATIENT
Start: 2022-08-01 | End: 2022-08-02 | Stop reason: ALTCHOICE

## 2022-08-01 RX ORDER — POTASSIUM CHLORIDE 20 MEQ/1
40 TABLET, EXTENDED RELEASE ORAL EVERY 4 HOURS
Status: DISPENSED | OUTPATIENT
Start: 2022-08-01 | End: 2022-08-02

## 2022-08-01 RX ORDER — LOPERAMIDE HYDROCHLORIDE 2 MG/1
2 CAPSULE ORAL
Status: DISCONTINUED | OUTPATIENT
Start: 2022-08-01 | End: 2022-08-04 | Stop reason: HOSPADM

## 2022-08-01 RX ORDER — POTASSIUM CHLORIDE 20 MEQ/1
40 TABLET, EXTENDED RELEASE ORAL ONCE
Status: COMPLETED | OUTPATIENT
Start: 2022-08-01 | End: 2022-08-01

## 2022-08-01 RX ORDER — IBUPROFEN 400 MG/1
400 TABLET ORAL EVERY 6 HOURS PRN
Status: DISCONTINUED | OUTPATIENT
Start: 2022-08-01 | End: 2022-08-04 | Stop reason: HOSPADM

## 2022-08-01 RX ORDER — TRAZODONE HYDROCHLORIDE 50 MG/1
50 TABLET ORAL NIGHTLY PRN
Status: DISCONTINUED | OUTPATIENT
Start: 2022-08-01 | End: 2022-08-04 | Stop reason: HOSPADM

## 2022-08-01 RX ORDER — ECHINACEA PURPUREA EXTRACT 125 MG
2 TABLET ORAL AS NEEDED
Status: DISCONTINUED | OUTPATIENT
Start: 2022-08-01 | End: 2022-08-04 | Stop reason: HOSPADM

## 2022-08-01 RX ORDER — ACETAMINOPHEN 325 MG/1
650 TABLET ORAL EVERY 6 HOURS PRN
Status: DISCONTINUED | OUTPATIENT
Start: 2022-08-01 | End: 2022-08-04 | Stop reason: HOSPADM

## 2022-08-01 RX ORDER — POTASSIUM CHLORIDE 1500 MG/1
40 TABLET, FILM COATED, EXTENDED RELEASE ORAL AS NEEDED
Status: DISCONTINUED | OUTPATIENT
Start: 2022-08-01 | End: 2022-08-04 | Stop reason: HOSPADM

## 2022-08-01 RX ORDER — BENZTROPINE MESYLATE 1 MG/1
2 TABLET ORAL ONCE AS NEEDED
Status: DISCONTINUED | OUTPATIENT
Start: 2022-08-01 | End: 2022-08-04 | Stop reason: HOSPADM

## 2022-08-01 RX ADMIN — POTASSIUM CHLORIDE 40 MEQ: 20 TABLET, EXTENDED RELEASE ORAL at 19:43

## 2022-08-01 NOTE — NURSING NOTE
Presented pt to Dr. Srpague and all abnormal labs. New orders to admit patient. Routine orders. [id number]. [id number].

## 2022-08-01 NOTE — NURSING NOTE
"Patient presents to intake stating \"I just feel weird.\" Patient reports she has been seeing \"weird faces and shapes\" in the trees and different places. She also reports there is a voice in her head that keeps saying \"bitch.\" She denies si and hi. She denies any issues with sleep or appetite. Patient reports she stopped taking her psyh meds about a week ago because she didn't like them. She denies any substance use at this time. She rates depression 5/10 and anxiety 4/10.     Spoke with patients mother Rolanda, She reports the patient has been acting really bizarre. She reports the patient told her that she is possessed with demons and that she has brought the demons into her home. She reports the patient refuses to go into her home and has been walking the streets of [place], stepping out in front of cars and different things. She also reports the patient stopped taking her medications \"months ago\" after her friend passed away. She reports the patient believes she has nails in her skin and will sit with tweezers and try to pull them out. Patient has numerous scabbed areas to bilateral arms.  "

## 2022-08-01 NOTE — ED PROVIDER NOTES
Subjective   37-year-old white female presents secondary to need for psychiatric evaluation.  Patient states that she been off of her medications for a while.  She states that she has been seeing things.  She has been very depressed.  She states that she has not been on any drugs for approximately a year.  She denies any exposure to COVID or COVID symptoms.  Her symptoms are moderate to severe.          Review of Systems   Constitutional: Negative.  Negative for fever.   HENT: Negative.    Respiratory: Negative.    Cardiovascular: Negative.  Negative for chest pain.   Gastrointestinal: Negative.  Negative for abdominal pain.   Endocrine: Negative.    Genitourinary: Negative.  Negative for dysuria.   Skin: Negative.    Neurological: Negative.    Psychiatric/Behavioral: Negative.  Negative for suicidal ideas.   All other systems reviewed and are negative.      Past Medical History:   Diagnosis Date   • Anxiety    • Arthritis    • Bronchitis    • Chronic back pain    • Fibromyalgia    • Hypertension    • MDD (major depressive disorder), recurrent, severe, with psychosis (HCC) 6/9/2018   • Panic disorder    • Psychiatric illness    • Schizoaffective disorder (HCC)    • Schizophrenia, paranoid ([facility])        Allergies   Allergen Reactions   • Elavil [Amitriptyline] Nausea Only       Past Surgical History:   Procedure Laterality Date   • WISDOM TOOTH EXTRACTION         Family History   Problem Relation Age of Onset   • Arthritis Mother    • Stroke Father    • COPD Brother    • Diabetes Maternal Grandmother    • COPD Maternal Grandfather    • Heart disease Maternal Grandfather    • Stroke Maternal Grandfather    • Anxiety disorder Neg Hx    • Depression Neg Hx    • Schizophrenia Neg Hx        Social History     Socioeconomic History   • Marital status: Single   Tobacco Use   • Smoking status: Current Every Day Smoker     Packs/day: 1.00     Years: 5.00     Pack years: 5.00     Types: Cigarettes   • Smokeless tobacco: Never  "Used   Vaping Use   • Vaping Use: Never used   Substance and Sexual Activity   • Alcohol use: No   • Drug use: No     Comment: denies   • Sexual activity: Not Currently     Partners: Male     Comment: reports she hasn't been in \"forever.\"            Objective   Physical Exam  Vitals and nursing note reviewed.   Constitutional:       General: She is not in acute distress.     Appearance: She is well-developed. She is not diaphoretic.   HENT:      Head: Normocephalic and atraumatic.      Right Ear: External ear normal.      Left Ear: External ear normal.      Nose: Nose normal.   Eyes:      Conjunctiva/sclera: Conjunctivae normal.      Pupils: Pupils are equal, round, and reactive to light.   Neck:      Vascular: No JVD.      Trachea: No tracheal deviation.   Cardiovascular:      Rate and Rhythm: Normal rate and regular rhythm.      Heart sounds: Normal heart sounds. No murmur heard.  Pulmonary:      Effort: Pulmonary effort is normal. No respiratory distress.      Breath sounds: Normal breath sounds. No wheezing.   Abdominal:      General: Bowel sounds are normal.      Palpations: Abdomen is soft.      Tenderness: There is no abdominal tenderness.   Musculoskeletal:         General: No deformity. Normal range of motion.      Cervical back: Normal range of motion and neck supple.   Skin:     General: Skin is warm and dry.      Coloration: Skin is not pale.      Findings: No erythema or rash.   Neurological:      Mental Status: She is alert and oriented to person, place, and time.      Cranial Nerves: No cranial nerve deficit.   Psychiatric:         Behavior: Behavior normal.         Thought Content: Thought content normal. Thought content does not include homicidal or suicidal ideation.         Procedures           ED Course                                           MDM  Number of Diagnoses or Management Options  Schizoaffective disorder, unspecified type (HCC): established and worsening     Amount and/or Complexity " of Data Reviewed  Clinical lab tests: ordered        Final diagnoses:   Schizoaffective disorder, unspecified type (HCC)       ED Disposition  ED Disposition     ED Disposition   DC/Transfer to Behavioral Health    Condition   Stable    Comment   --             No follow-up provider specified.       Medication List      ASK your doctor about these medications    sulfamethoxazole-trimethoprim 800-160 MG per tablet  Commonly known as: BACTRIM DS,SEPTRA DS  Take 1 tablet by mouth 2 (Two) Times a Day for 10 days.  Ask about: Should I take this medication?             Johnnie Diamond PA  08/01/22 1947

## 2022-08-02 PROBLEM — F11.20 OPIOID USE DISORDER, SEVERE, ON MAINTENANCE THERAPY (HCC): Status: ACTIVE | Noted: 2022-08-02

## 2022-08-02 LAB
AMPHET+METHAMPHET UR QL: NEGATIVE
AMPHETAMINES UR QL: NEGATIVE
BARBITURATES UR QL SCN: NEGATIVE
BENZODIAZ UR QL SCN: NEGATIVE
BUPRENORPHINE SERPL-MCNC: POSITIVE NG/ML
CANNABINOIDS SERPL QL: NEGATIVE
COCAINE UR QL: NEGATIVE
METHADONE UR QL SCN: NEGATIVE
OPIATES UR QL: NEGATIVE
OXYCODONE UR QL SCN: NEGATIVE
PCP UR QL SCN: NEGATIVE
POTASSIUM SERPL-SCNC: 4.3 MMOL/L (ref 3.5–5.2)
PROPOXYPH UR QL: NEGATIVE
QT INTERVAL: 384 MS
QTC INTERVAL: 440 MS
TRICYCLICS UR QL SCN: NEGATIVE

## 2022-08-02 PROCEDURE — 80306 DRUG TEST PRSMV INSTRMNT: CPT | Performed by: PSYCHIATRY & NEUROLOGY

## 2022-08-02 PROCEDURE — 99223 1ST HOSP IP/OBS HIGH 75: CPT | Performed by: PSYCHIATRY & NEUROLOGY

## 2022-08-02 PROCEDURE — 84132 ASSAY OF SERUM POTASSIUM: CPT | Performed by: STUDENT IN AN ORGANIZED HEALTH CARE EDUCATION/TRAINING PROGRAM

## 2022-08-02 RX ORDER — ARIPIPRAZOLE 10 MG/1
10 TABLET ORAL DAILY
Status: DISCONTINUED | OUTPATIENT
Start: 2022-08-02 | End: 2022-08-04 | Stop reason: HOSPADM

## 2022-08-02 RX ORDER — SULFAMETHOXAZOLE AND TRIMETHOPRIM 800; 160 MG/1; MG/1
1 TABLET ORAL 2 TIMES DAILY
COMMUNITY
End: 2022-08-04 | Stop reason: HOSPADM

## 2022-08-02 RX ORDER — BUPRENORPHINE HYDROCHLORIDE AND NALOXONE HYDROCHLORIDE DIHYDRATE 8; 2 MG/1; MG/1
1 TABLET SUBLINGUAL DAILY
COMMUNITY

## 2022-08-02 RX ORDER — POTASSIUM CHLORIDE 20 MEQ/1
20 TABLET, EXTENDED RELEASE ORAL DAILY
Status: CANCELLED | OUTPATIENT
Start: 2022-08-02

## 2022-08-02 RX ORDER — POTASSIUM CHLORIDE 20 MEQ/1
20 TABLET, EXTENDED RELEASE ORAL DAILY
Status: ON HOLD | COMMUNITY
End: 2022-08-02

## 2022-08-02 RX ORDER — BUPRENORPHINE HYDROCHLORIDE AND NALOXONE HYDROCHLORIDE DIHYDRATE 8; 2 MG/1; MG/1
1 TABLET SUBLINGUAL DAILY
Status: CANCELLED | OUTPATIENT
Start: 2022-08-02

## 2022-08-02 RX ORDER — HYDROXYZINE PAMOATE 25 MG/1
25 CAPSULE ORAL EVERY 6 HOURS PRN
COMMUNITY
End: 2022-09-07 | Stop reason: SDUPTHER

## 2022-08-02 RX ORDER — HYDROXYZINE HYDROCHLORIDE 25 MG/1
25 TABLET, FILM COATED ORAL 3 TIMES DAILY PRN
Status: DISCONTINUED | OUTPATIENT
Start: 2022-08-02 | End: 2022-08-04 | Stop reason: HOSPADM

## 2022-08-02 RX ORDER — SULFAMETHOXAZOLE AND TRIMETHOPRIM 800; 160 MG/1; MG/1
1 TABLET ORAL 2 TIMES DAILY
Status: COMPLETED | OUTPATIENT
Start: 2022-08-02 | End: 2022-08-03

## 2022-08-02 RX ADMIN — SULFAMETHOXAZOLE AND TRIMETHOPRIM 1 TABLET: 800; 160 TABLET ORAL at 12:24

## 2022-08-02 RX ADMIN — IBUPROFEN 400 MG: 400 TABLET, FILM COATED ORAL at 11:43

## 2022-08-02 RX ADMIN — POTASSIUM CHLORIDE 40 MEQ: 20 TABLET, EXTENDED RELEASE ORAL at 06:05

## 2022-08-02 RX ADMIN — ARIPIPRAZOLE 10 MG: 10 TABLET ORAL at 11:42

## 2022-08-02 RX ADMIN — SULFAMETHOXAZOLE AND TRIMETHOPRIM 1 TABLET: 800; 160 TABLET ORAL at 21:25

## 2022-08-02 RX ADMIN — NICOTINE TRANSDERMAL SYSTEM 1 PATCH: 21 PATCH, EXTENDED RELEASE TRANSDERMAL at 08:38

## 2022-08-02 RX ADMIN — POTASSIUM CHLORIDE 40 MEQ: 20 TABLET, EXTENDED RELEASE ORAL at 02:11

## 2022-08-02 NOTE — PLAN OF CARE
"Goal Outcome Evaluation:  Plan of Care Reviewed With: patient  Patient Agreement with Plan of Care: agrees     Progress: no change  Outcome Evaluation: Patient is irritable this shift. Rates anxiety a 4. When asked about depression, patient stated, \"I don't know.\" Patient avoids social interactions.  "

## 2022-08-02 NOTE — PLAN OF CARE
Problem: Adult Behavioral Health Plan of Care  Goal: Plan of Care Review  Outcome: Ongoing, Progressing  Flowsheets  Taken 8/2/2022 1429 by Susanne Barnes  Consent Given to Review Plan with: Arabella Seo, mother  Progress: improving  Plan of Care Reviewed With: patient  Outcome Evaluation:  • Therapist met with patient to review plan of care  • patient agreeable.  Taken 8/2/2022 0810 by Naya Boyce, RN  Patient Agreement with Plan of Care: agrees  Goal: Patient-Specific Goal (Individualization)  Outcome: Ongoing, Progressing  Flowsheets  Taken 8/2/2022 1430 by Susanne Barnes  Patient-Specific Goals (Include Timeframe): Identify 2-3 healthy coping skills, deny SI/HI, complete safety planning, and complete aftercare planning prior to discharge.  Individualized Care Needs: Therapist to offer 1-4 individual sessions, daily groups, family education, safety planning, aftercare planning, and brief CBT/MI interventions during hosptialization.  Taken 8/2/2022 1423 by Susanne Barnes  Patient Personal Strengths:  • resilient  • self-reliant  • resourceful  Patient Vulnerabilities:  • lacks insight into illness  • poor impulse control  Taken 8/1/2022 2046 by Alexsandra Granados, RN  Anxieties, Fears or Concerns: None verbalized  Goal: Optimized Coping Skills in Response to Life Stressors  Outcome: Ongoing, Progressing  Flowsheets (Taken 8/2/2022 1430)  Optimized Coping Skills in Response to Life Stressors: making progress toward outcome  Intervention: Promote Effective Coping Strategies  Flowsheets (Taken 8/2/2022 1430)  Supportive Measures:  • active listening utilized  • counseling provided  • decision-making supported  • goal-setting facilitated  • problem-solving facilitated  • self-responsibility promoted  • self-reflection promoted  • positive reinforcement provided  • self-care encouraged  • relaxation techniques promoted  • verbalization of feelings encouraged  Goal: Develops/Participates in Therapeutic [place]  to Support Successful Transition  Outcome: Ongoing, Progressing  Flowsheets (Taken 8/2/2022 1430)  Develops/Participates in Therapeutic [place] to Support Successful Transition: making progress toward outcome  Intervention: Foster Therapeutic [place]  Flowsheets (Taken 8/2/2022 1430)  Trust Relationship/Rapport:  • care explained  • questions encouraged  • choices provided  • reassurance provided  • emotional support provided  • thoughts/feelings acknowledged  • empathic listening provided  • questions answered  Intervention: Mutually Develop Transition Plan  Flowsheets  Taken 8/2/2022 1430 by Susanne Barnes  Outpatient/Agency/Support Group Needs:  • outpatient counseling  • outpatient medication management  • outpatient psychiatric care (specify)  Discharge Coordination/Progress: Patient has insurance and denies transportation concerns. Agreeable to aftercare.  Transition Support: community resources reviewed  Anticipated Discharge Disposition: home or self-care  Transportation Concerns: none  Current Discharge Risk: psychiatric illness  Concerns to be Addressed:  • mental health  • coping/stress  Readmission Within the Last 30 Days: no previous admission in last 30 days  Patient's Choice of Community Agency(s): [facility]  Offered/Gave Vendor List: no  Taken 8/1/2022 2046 by Alexsandra Granados RN  Transportation Anticipated: family or friend will provide  Patient/Family Anticipated Services at Transition:  • mental health services  • outpatient care  Patient/Family Anticipates Transition to: home   Goal Outcome Evaluation:  Plan of Care Reviewed With: patient  Consent Given to Review Plan with: mother Pittman  Progress: improving  Outcome Evaluation: Therapist met with patient to review plan of care; patient agreeable.       DATA:      Therapist discussed case with RN and met with patient today to review coping skills, review plan of care, and discuss discharge.    Therapist obtained  "consent for Arabella Seo, patient's mother. Will attempt contact tomorrow.     Therapist recommends outpatient medication management and therapy. Patient is an established patient at [facility] in [place].     Clinical Maneuvering/Intervention:     Therapist assisted patient in processing above session content; acknowledged and normalized patient’s thoughts, feelings, and concerns.  Discussed the therapist/patient relationship and explain the parameters and limitations of relative confidentiality.  Also discussed the importance of active participation, and honesty to the treatment process.  Encouraged the patient to discuss/vent their feelings, frustrations, and fears concerning their ongoing medical issues and validated their feelings.     Allowed patient to freely discuss issues without interruption or judgment. Provided safe, confidential environment to facilitate the development of positive therapeutic relationship and encourage open, honest communication.      Therapist addressed discharge safety planning this date. Assisted patient in identifying risk factors which would indicate the need for higher level of care after discharge;  including thoughts to harm self or others and/or self-harming behavior. Encouraged patient to call 911, or present to the nearest emergency room should any of these events occur. Discussed crisis intervention services and means to access.  Encouraged securing any objects of harm.    Therapist completed integrated summary, treatment plan, and initiated social history this date.  Therapist is strongly encouraging family involvement in treatment.       Encouraged mask wearing, social distancing, and regular hand washing due to COVID19 risk.      ASSESSMENT:     Patient is a 37 year old female who presented to the ED for psychosis. Per ED, patient reports feeling \"weird\" and seeing faces and shapes in trees and hearing voices saying \"bitch\" repeatedly. Patient reports she stopped taking " "her psychotropic medication a week ago because she did not like them. Patient's mother reported to intake that the patient has been acting bizarre. She reports the patient told her she is possessed with demons. Reports she has been walking on the street and stepping in front of cars. Reports the patient actually stopped taking her medications months ago. Reports patient believes she has nails in her skin and tries to pull them out with tweezers.      Therapist met 1:1 with patient today. Patient denies suicidal ideation. Patient denies homicidal ideation. Patient reports AVH. Patient appears agitated and on edge. She is cooperative with the assessment, but reserved, guarded, and affect is restricted. Patient appears suspicious and distrusting of staff. She reports she feels weird and not like herself. Patient reports she has not felt normal since she stopped her medication. She declines additional conversation today, stating \"I don't want to talk about anything else.\" Patient is agreeable to follow up with [facility] in [place] where she sees Donna Freeman.      PLAN:       Patient to remain hospitalized this date.      Treatment team will focus efforts on stabilizing patient's acute symptoms while providing education on healthy coping and crisis management to reduce hospitalizations.   Patient requires daily psychiatrist evaluation and 24/7 nursing supervision to promote patient  safety.     Therapist will offer 1-4 individual sessions, 1 therapy group daily, family education, and appropriate referral.    "

## 2022-08-02 NOTE — H&P
"INITIAL PSYCHIATRIC HISTORY & PHYSICAL    Patient Identification:  Name:   Cintia Issa  Age:   37 y.o.  Sex:   female  :   1985  MRN:   [id number]  Visit Number:   [id number]  Primary Care Physician:   Neal Garcia MD    SUBJECTIVE    CC/Focus of Exam: psychosis    HPI: Cintia Issa is a 37 y.o. female who was admitted on 2022 with complaints of psychosis. Patient presents to intake stating \"I just feel weird.\" Patient reports she has been seeing \"weird faces and shapes\" in the trees and different places.  Patient  reports there is a voice in her head that keeps saying \"bitch.\"  Patient's mother reports the patient has been acting really bizarre. Patient's mother  reports the patient told her that she is possessed with demons and that she has brought the demons into her home.  Patient's mother reports the patient refuses to go into her home and has been walking the streets of [place], stepping out in front of cars and different things. Patient's mother reports the patient stopped taking her medications \"months ago\" after her friend passed away. Patient's mother reports the patient believes she has nails in her skin and will sit with tweezers and try to pull them out. Patient denies any substance abuse but states that she is prescribed suboxone. Patient denies any alcohol abuse. Patient states that she uses tobacco. Patient states the death of her friend as a stressor in her life. Patient denies any history of physical, mental or sexual abuse. Patient rates her appetite as good. Patient rates her sleep as good. Patient denies any nightmares. Patient rates her anxiety on a scale of 1/10 with 10 being the most severe a 4. Patient rates her depression on a scale of 1/10 with 10 being the most severe a 5. Patient denies any suicidal ideation. Patient denies any homicidal ideation. Patient states that she has hallucinations.  Patient was admitted to [facility] psychiatry " for further safety and stabilization.    Available medical/psychiatric records reviewed and incorporated into the current document.     PAST PSYCHIATRIC HX: Patient has had 1 prior admission on 03--03-. Patient states that she receives outpatient care with [facility].     SUBSTANCE USE HX: UDS was negative. See HPI for current use.     SOCIAL HX: Patient states that she was born in [place], [place]. Patient states that she was raised in [place], [place]. Patient states that she currently resides by herself in [place], [place]. Patient states that she is single and has no children. Patient states that she draws disability. Patient states that she has a highschool diploma. Patient denies any legal issues.     Past Medical History:   Diagnosis Date   • Anxiety    • Arthritis    • Bronchitis    • Chronic back pain    • Fibromyalgia    • Hypertension    • MDD (major depressive disorder), recurrent, severe, with psychosis (HCC) 06/09/2018   • Panic disorder    • Psychiatric illness    • Schizoaffective disorder (HCC)    • Schizophrenia, paranoid (HCC)        Past Surgical History:   Procedure Laterality Date   • WISDOM TOOTH EXTRACTION         Family History   Problem Relation Age of Onset   • Arthritis Mother    • Stroke Father    • COPD Brother    • Diabetes Maternal Grandmother    • COPD Maternal Grandfather    • Heart disease Maternal Grandfather    • Stroke Maternal Grandfather    • Anxiety disorder Neg Hx    • Depression Neg Hx    • Schizophrenia Neg Hx          Facility-Administered Medications Prior to Admission   Medication Dose Route Frequency Provider Last Rate Last Admin   • ARIPiprazole ER (ABILIFY MAINTENA) IM injection  mg  400 mg Intramuscular Q28 Days Donna Freeman APRN   400 mg at 03/06/19 1610   • ARIPiprazole ER (ABILIFY MAINTENA) IM injection  mg  400 mg Intramuscular Q28 Days Donna Freeman APRN   400 mg at 02/05/20 1048   • ARIPiprazole ER (ABILIFY MAINTENA) IM  injection  mg  400 mg Intramuscular Q28 Days Donna Freeman APRN   400 mg at 10/06/21 1023     Medications Prior to Admission   Medication Sig Dispense Refill Last Dose   • buprenorphine-naloxone (SUBOXONE) 8-2 MG per SL tablet Place 1 tablet under the tongue Daily.   8/1/2022 at Unknown time   • potassium chloride (K-DUR,KLOR-CON) 20 MEQ CR tablet Take 20 mEq by mouth Daily.   8/1/2022 at Unknown time   • sulfamethoxazole-trimethoprim (BACTRIM DS,SEPTRA DS) 800-160 MG per tablet Take 1 tablet by mouth 2 (Two) Times a Day.   8/1/2022 at Unknown time   • hydrOXYzine pamoate (VISTARIL) 25 MG capsule Take 25 mg by mouth Every 6 (Six) Hours As Needed for Itching.   Unknown at Unknown time           ALLERGIES:  Elavil [amitriptyline]    Temp:  [96.1 °F (35.6 °C)-97.3 °F (36.3 °C)] 96.1 °F (35.6 °C)  Heart Rate:  [] 80  Resp:  [18] 18  BP: (111-125)/(72-79) 111/72    REVIEW OF SYSTEMS:  Review of Systems   Constitutional: Positive for chills and fatigue.   HENT: Negative.    Eyes: Negative.    Respiratory: Negative.    Cardiovascular: Negative.    Gastrointestinal: Negative.    Endocrine: Negative.    Genitourinary: Negative.    Musculoskeletal: Negative.    Skin: Negative.    Allergic/Immunologic: Negative.    Neurological: Negative.    Hematological: Negative.    Psychiatric/Behavioral: Positive for dysphoric mood and hallucinations. The patient is nervous/anxious.       OBJECTIVE    PHYSICAL EXAM:  Physical Exam  Constitutional:  Appears well-developed and well-nourished.   HENT:   Head: Normocephalic and atraumatic.   Right Ear: External ear normal.   Left Ear: External ear normal.   Mouth/Throat: Oropharynx is clear and moist.   Eyes: Pupils are equal, round, and reactive to light. Conjunctivae and EOM are normal.   Neck: Normal range of motion. Neck supple.   Cardiovascular: Normal rate, regular rhythm and normal heart sounds.    Respiratory: Effort normal and breath sounds normal. No respiratory  distress. No wheezes.   GI: Soft. Bowel sounds are normal.No distension. There is no tenderness.   Musculoskeletal: Normal range of motion. No edema or deformity.   Neurological:No cranial nerve deficit. Coordination normal.   Skin: Skin is warm and dry. Multiple spots both forearms where skins appears to be picked.        MENTAL STATUS EXAM:   Hygiene:   fair  Cooperation:  Cooperative  Eye Contact:  Good  Psychomotor Behavior:  Appropriate  Affect:  Appropriate  Hopelessness: 4  Speech:  Normal  Linear  Thought Content:  Normal  Suicidal:  None  Homicidal:  None  Hallucinations:  Auditory, Visual and Tactile  Delusion:  Paranoid  Memory:  Intact  Orientation:  Person and Place  Reliability:  fair  Insight:  Poor  Judgement:  Poor  Impulse Control:  Poor      Imaging Results (Last 24 Hours)     ** No results found for the last 24 hours. **           ECG/EMG Results (most recent)     Procedure Component Value Units Date/Time    ECG 12 Lead [[id number]] Collected: 08/01/22 2159     Updated: 08/01/22 2204     QT Interval 384 ms      QTC Interval 440 ms     Narrative:      Test Reason : Baseline Cardiac Status  Blood Pressure :   */*   mmHG  Vent. Rate :  79 BPM     Atrial Rate :  79 BPM     P-R Int : 124 ms          QRS Dur :  86 ms      QT Int : 384 ms       P-R-T Axes :  69  66  72 degrees     QTc Int : 440 ms    Normal sinus rhythm with sinus arrhythmia  Normal ECG  When compared with ECG of 28-JUL-2022 18:48,  No significant change was found    Referred By:            Confirmed By:            Lab Results   Component Value Date    GLUCOSE 93 08/01/2022    BUN 7 08/01/2022    CREATININE 0.88 08/01/2022    EGFRIFNONA 72 03/03/2019    BCR 8.0 08/01/2022    CO2 24.3 08/01/2022    CALCIUM 9.5 08/01/2022    ALBUMIN 4.29 08/01/2022    LABIL2 1.5 11/05/2015    AST 20 08/01/2022    ALT 13 08/01/2022       Lab Results   Component Value Date    WBC 9.64 08/01/2022    HGB 12.2 08/01/2022    HCT 35.7 08/01/2022    MCV 85.6  08/01/2022     08/01/2022       Pain Management Panel     Pain Management Panel Latest Ref Rng & Units 8/1/2022 7/28/2022    AMPHETAMINES SCREEN, URINE Negative Negative Positive(A)    BARBITURATES SCREEN Negative Negative Negative    BENZODIAZEPINE SCREEN, URINE Negative Negative Negative    BUPRENORPHINEUR Negative Negative Positive(A)    COCAINE SCREEN, URINE Negative Negative Negative    METHADONE SCREEN, URINE Negative Negative Negative    METHAMPHETAMINEUR Negative Negative Negative          Brief Urine Lab Results  (Last result in the past 365 days)      Color   Clarity   Blood   Leuk Est   Nitrite   Protein   CREAT   Urine HCG        08/01/22 1859 Yellow   Clear   Negative   Trace   Negative   Negative           08/01/22 1859               Negative           DATA  Labs reviewed. Potassium 3.1. UDS negative.   EKG reviewed. QTc 440 ms. SMITH reviewed.   Record reviewed. The patient was last here in March 2019 and was treated for schizoaffective disorder.     ASSESSMENT & PLAN:        Schizoaffective disorder (HCC)  - Start Abilify 10 mg daily      Essential hypertension  - Patient was previously on metoprolol. Currently blood pressure is normal. Continue to monitor      Opioid use disorder, severe, on maintenance therapy (HCC)  - Patient claims she is on MAT with buprenorphine. UDS is negative. Will check UDS again and also confirm with pharmacy      Hypokalemia  - Replacement per protocol      Nicotine use disorder  - Nicotine replacement  - Encourage cessation      The patient has been admitted for safety and stabilization.  Patient will be monitored for suicidality daily and maintained on Special Precautions Level 3 (q15 min checks) .  The patient will have individual and group therapy with a master's level therapist. A master treatment plan will be developed and agreed upon by the patient and his/her treatment team.  The patient's estimated length of stay in the hospital is 5-7 days.        Written by Oanh Major acting as scribe for Dr.Mazhar Lin signature on this note affirms that the note adequately documents the care provided.   This note was generated using a scribe,   Oanh Major MA  08/02/22  8:14 AM EDT    I, Mario Lin MD, personally performed the services described in this documentation as scribed by the above named individual in my presence, and it is both accurate and complete.

## 2022-08-02 NOTE — PLAN OF CARE
Problem: Adult Behavioral Health Plan of Care  Goal: Plan of Care Review  Recent Flowsheet Documentation  Taken 8/1/2022 2046 by Alexsandra Granados, RN  Plan of Care Reviewed With: patient  Patient Agreement with Plan of Care: agrees   Goal Outcome Evaluation:  Plan of Care Reviewed With: patient  Patient Agreement with Plan of Care: agrees         New admission.  Care plan initiated.

## 2022-08-03 PROCEDURE — 99232 SBSQ HOSP IP/OBS MODERATE 35: CPT | Performed by: PSYCHIATRY & NEUROLOGY

## 2022-08-03 RX ORDER — BUPRENORPHINE HYDROCHLORIDE AND NALOXONE HYDROCHLORIDE DIHYDRATE 8; 2 MG/1; MG/1
1 TABLET SUBLINGUAL DAILY
Status: DISCONTINUED | OUTPATIENT
Start: 2022-08-03 | End: 2022-08-04 | Stop reason: HOSPADM

## 2022-08-03 RX ADMIN — NICOTINE TRANSDERMAL SYSTEM 1 PATCH: 21 PATCH, EXTENDED RELEASE TRANSDERMAL at 08:30

## 2022-08-03 RX ADMIN — SULFAMETHOXAZOLE AND TRIMETHOPRIM 1 TABLET: 800; 160 TABLET ORAL at 08:30

## 2022-08-03 RX ADMIN — SULFAMETHOXAZOLE AND TRIMETHOPRIM 1 TABLET: 800; 160 TABLET ORAL at 20:27

## 2022-08-03 RX ADMIN — HYDROXYZINE HYDROCHLORIDE 25 MG: 25 TABLET ORAL at 18:21

## 2022-08-03 RX ADMIN — IBUPROFEN 400 MG: 400 TABLET, FILM COATED ORAL at 18:21

## 2022-08-03 RX ADMIN — BUPRENORPHINE HYDROCHLORIDE AND NALOXONE HYDROCHLORIDE DIHYDRATE 1 TABLET: 8; 2 TABLET SUBLINGUAL at 13:39

## 2022-08-03 RX ADMIN — ARIPIPRAZOLE 10 MG: 10 TABLET ORAL at 08:30

## 2022-08-03 NOTE — PLAN OF CARE
Goal Outcome Evaluation:  Plan of Care Reviewed With: patient  Patient Agreement with Plan of Care: agrees     Progress: improving  Outcome Evaluation: Patient withdrawn, initially irritable early shift, however, more cooperative later in shift. Patient denies suicidal or homicidal ideation

## 2022-08-03 NOTE — PLAN OF CARE
DATA:      Therapist discussed case with RN and met with patient today to review coping skills, review plan of care, and discuss discharge.    Therapist spoke with patient's mother, Arabella for an update and to complete safety planning. Arabella reports that the patient's behaviors have been bizarre for the past 3 weeks. She reports the patient has been experiencing visual, auditory, tactile, and olfactory hallucinations. Arabella reports the patient's close friend  1 month ago and she stopped taking her medications afterwards.     Arabella reports she would feel more comfortable if the patient stayed until Monday to have more time to get stabilized because this episode was so severe.      Therapist completed safety planning with Arabella. Therapist advised that the patient should not have access to firearms/weapons, knives/sharp objects, or medications. She is agreeable and voices understanding the importance of safety planning. Arabella denies any questions or concerns today.      Clinical Maneuvering/Intervention:     Therapist assisted patient in processing above session content; acknowledged and normalized patient’s thoughts, feelings, and concerns.  Discussed the therapist/patient relationship and explain the parameters and limitations of relative confidentiality.  Also discussed the importance of active participation, and honesty to the treatment process.  Encouraged the patient to discuss/vent their feelings, frustrations, and fears concerning their ongoing medical issues and validated their feelings.     Allowed patient to freely discuss issues without interruption or judgment. Provided safe, confidential environment to facilitate the development of positive therapeutic relationship and encourage open, honest communication.      Therapist addressed discharge safety planning this date. Assisted patient in identifying risk factors which would indicate the need for higher level of care after discharge;  including  thoughts to harm self or others and/or self-harming behavior. Encouraged patient to call 911, or present to the nearest emergency room should any of these events occur. Discussed crisis intervention services and means to access.  Encouraged securing any objects of harm.    Therapist completed integrated summary, treatment plan, and initiated social history this date.  Therapist is strongly encouraging family involvement in treatment.       Encouraged mask wearing, social distancing, and regular hand washing due to COVID19 risk.      ASSESSMENT:      Therapist met 1:1 with patient today. Patient denies SI/HI. She reports ongoing AVH, but reports some improvement. Patient states she feels a little bit better today. She states she has been able to get some rest and it has been helpful. Patient's affect is a bit brighter today and she has been more active on the unit. Patient appears less suspicious of staff and peers today. Patient reports feeling comfortable with her current medication regiment.      PLAN:       Patient to remain hospitalized this date.      Treatment team will focus efforts on stabilizing patient's acute symptoms while providing education on healthy coping and crisis management to reduce hospitalizations.   Patient requires daily psychiatrist evaluation and 24/7 nursing supervision to promote patient  safety.     Therapist will offer 1-4 individual sessions, 1 therapy group daily, family education, and appropriate referral.

## 2022-08-03 NOTE — PROGRESS NOTES
"INPATIENT PSYCHIATRIC PROGRESS NOTE    Name:  Cintia Issa  :  1985  MRN:  [id number]  Visit Number:  [id number]  Length of stay:  2    SUBJECTIVE    CC/Focus of Exam: psychosis    INTERVAL HISTORY:  The patient states she doesn't feel as bad as she did, but still is not getting enough sleep, and is feeling tired. She states she is getting Suboxone regularly prescribed and she is in good standing with [facility] in [facility], phone number [phone].  Depression rating 2-3/10  Anxiety rating 3/10  Sleep: not too good  Withdrawal sx: body aches  Cravin/10    Review of Systems   Respiratory: Negative.    Cardiovascular: Negative.    Gastrointestinal: Negative.    Musculoskeletal: Positive for myalgias.       OBJECTIVE    Temp:  [97 °F (36.1 °C)-97.4 °F (36.3 °C)] 97.4 °F (36.3 °C)  Heart Rate:  [89-98] 89  Resp:  [16] 16  BP: (118-121)/(77-79) 121/79    MENTAL STATUS EXAM:  Appearance:Casually dressed, good hygeine.   Cooperation:Cooperative  Psychomotor: No psychomotor agitation/retardation, No EPS, No motor tics  Speech-normal rate, amount.  Mood \"better\"   Affect-congruent, appropriate, stable  Thought Content-goal directed, no delusional material present  Thought process-linear, organized.  Suicidality: No SI  Homicidality: No HI  Perception: No AH/VH  Insight-fair   Judgement-fair    Lab Results (last 24 hours)     Procedure Component Value Units Date/Time    Urine Drug Screen - Urine, Clean Catch [[id number]]  (Abnormal) Collected: 22    Specimen: Urine, Clean Catch Updated: 22     THC, Screen, Urine Negative     Phencyclidine (PCP), Urine Negative     Cocaine Screen, Urine Negative     Methamphetamine, Ur Negative     Opiate Screen Negative     Amphetamine Screen, Urine Negative     Benzodiazepine Screen, Urine Negative     Tricyclic Antidepressants Screen Negative     Methadone Screen, Urine Negative     Barbiturates Screen, Urine Negative     Oxycodone " Screen, Urine Negative     Propoxyphene Screen Negative     Buprenorphine, Screen, Urine Positive    Narrative:      Cutoff For Drugs Screened:    Amphetamines               500 ng/ml  Barbiturates               200 ng/ml  Benzodiazepines            150 ng/ml  Cocaine                    150 ng/ml  Methadone                  200 ng/ml  Opiates                    100 ng/ml  Phencyclidine               25 ng/ml  THC                            50 ng/ml  Methamphetamine            500 ng/ml  Tricyclic Antidepressants  300 ng/ml  Oxycodone                  100 ng/ml  Propoxyphene               300 ng/ml  Buprenorphine               10 ng/ml    The normal value for all drugs tested is negative. This report includes unconfirmed screening results, with the cutoff values listed, to be used for medical treatment purposes only.  Unconfirmed results must not be used for non-medical purposes such as employment or legal testing.  Clinical consideration should be applied to any drug of abuse test, particularly when unconfirmed results are used.               Imaging Results (Last 24 Hours)     ** No results found for the last 24 hours. **             ECG/EMG Results (most recent)     Procedure Component Value Units Date/Time    ECG 12 Lead [[id number]] Collected: 08/01/22 2159     Updated: 08/02/22 1934     QT Interval 384 ms      QTC Interval 440 ms     Narrative:      Test Reason : Baseline Cardiac Status  Blood Pressure :   */*   mmHG  Vent. Rate :  79 BPM     Atrial Rate :  79 BPM     P-R Int : 124 ms          QRS Dur :  86 ms      QT Int : 384 ms       P-R-T Axes :  69  66  72 degrees     QTc Int : 440 ms    Normal sinus rhythm with sinus arrhythmia  Normal ECG  Confirmed by Gurpreet Mcmullen ([id number]) on 8/2/2022 7:34:36 PM    Referred By:            Confirmed By: Gurpreet Mcmullen           ALLERGIES: Elavil [amitriptyline]      Current Facility-Administered Medications:   •  acetaminophen (TYLENOL) tablet 650 mg, 650 mg, Oral, Q6H  PRN, Naya Sprague MD  •  aluminum-magnesium hydroxide-simethicone (MAALOX MAX) 400-400-40 MG/5ML suspension 15 mL, 15 mL, Oral, Q6H PRN, Naya Sprague MD  •  ARIPiprazole (ABILIFY) tablet 10 mg, 10 mg, Oral, Daily, Mario Lin MD, 10 mg at 08/03/22 0830  •  benzonatate (TESSALON) capsule 100 mg, 100 mg, Oral, TID PRN, Naya Sprague MD  •  benztropine (COGENTIN) tablet 2 mg, 2 mg, Oral, Once PRN **OR** benztropine (COGENTIN) injection 1 mg, 1 mg, Intramuscular, Once PRN, Naya Sprague MD  •  famotidine (PEPCID) tablet 20 mg, 20 mg, Oral, BID PRN, Naya Sprague MD  •  hydrOXYzine (ATARAX) tablet 25 mg, 25 mg, Oral, TID PRN, Mario Lin MD  •  ibuprofen (ADVIL,MOTRIN) tablet 400 mg, 400 mg, Oral, Q6H PRN, Naya Sprague MD, 400 mg at 08/02/22 1143  •  loperamide (IMODIUM) capsule 2 mg, 2 mg, Oral, Q2H PRN, Darcie, Naya Dang MD  •  magnesium hydroxide (MILK OF MAGNESIA) suspension 10 mL, 10 mL, Oral, Daily PRN, Naya Sprague MD  •  nicotine (NICODERM CQ) 21 MG/24HR patch 1 patch, 1 patch, Transdermal, Q24H, Naya Sprague MD, 1 patch at 08/03/22 0830  •  ondansetron (ZOFRAN) tablet 4 mg, 4 mg, Oral, Q6H PRN, Naya Sprague MD  •  potassium chloride (K-DUR,KLOR-CON) ER tablet 40 mEq, 40 mEq, Oral, PRN, Naya Sprague MD  •  sodium chloride nasal spray 2 spray, 2 spray, Each Nare, PRN, Naya Sprague MD  •  sulfamethoxazole-trimethoprim (BACTRIM DS,SEPTRA DS) 800-160 MG per tablet 1 tablet, 1 tablet, Oral, BID, Mario Lin MD, 1 tablet at 08/03/22 0830  •  traZODone (DESYREL) tablet 50 mg, 50 mg, Oral, Nightly PRN, Naya Sprague MD    ASSESSMENT & PLAN:      Schizoaffective disorder (HCC)  - Continue Abilify 10 mg daily       Essential hypertension  - Patient was previously on metoprolol. Currently blood pressure is normal. Continue to monitor       Opioid use disorder, severe, on maintenance therapy (HCC)  - Patient claims she is  on MAT with buprenorphine. Repeat UDS is positive for buprenorphine. Patient agreed for us to contact her clinic.Will get records from her clinic and in the meantime start her back on Suboxone.       Hypokalemia  - Replacement per protocol       Nicotine use disorder  - Nicotine replacement  - Encourage cessation    Special precautions: Special Precautions Level 3 (q15 min checks) .    Behavioral Health Treatment Plan and Problem List: I have reviewed and approved the Behavioral Health Treatment Plan and Problem list.  The patient has had a chance to review and agrees with the treatment plan.     Clinician:  Mario Lin MD  08/03/22  13:16 EDT

## 2022-08-03 NOTE — PLAN OF CARE
Goal Outcome Evaluation:  Plan of Care Reviewed With: patient  Patient Agreement with Plan of Care: agrees     Progress: no change  Outcome Evaluation: Patient withdrawn and irritable, cooperative during assessment, provided urine for UDS, pos for Suboxone, rates anxiety 4/10, depression 3/10, reports poor sleep, denies SI/HI/AVH, flat affect, slept through the night.

## 2022-08-03 NOTE — NURSING NOTE
Obtained consent to obtain records , contacted [facility],via phone and  Faxed to appropriate consent, see chart,, awaiting response

## 2022-08-04 VITALS
HEIGHT: 66 IN | TEMPERATURE: 97.1 F | DIASTOLIC BLOOD PRESSURE: 73 MMHG | WEIGHT: 161.4 LBS | RESPIRATION RATE: 18 BRPM | BODY MASS INDEX: 25.94 KG/M2 | OXYGEN SATURATION: 98 % | HEART RATE: 97 BPM | SYSTOLIC BLOOD PRESSURE: 111 MMHG

## 2022-08-04 PROCEDURE — 99239 HOSP IP/OBS DSCHRG MGMT >30: CPT | Performed by: PSYCHIATRY & NEUROLOGY

## 2022-08-04 RX ORDER — ARIPIPRAZOLE 10 MG/1
10 TABLET ORAL DAILY
Qty: 30 TABLET | Refills: 0 | Status: SHIPPED | OUTPATIENT
Start: 2022-08-05 | End: 2022-09-07 | Stop reason: SDUPTHER

## 2022-08-04 RX ADMIN — NICOTINE TRANSDERMAL SYSTEM 1 PATCH: 21 PATCH, EXTENDED RELEASE TRANSDERMAL at 08:34

## 2022-08-04 RX ADMIN — ARIPIPRAZOLE 10 MG: 10 TABLET ORAL at 08:34

## 2022-08-04 RX ADMIN — BUPRENORPHINE HYDROCHLORIDE AND NALOXONE HYDROCHLORIDE DIHYDRATE 1 TABLET: 8; 2 TABLET SUBLINGUAL at 08:34

## 2022-08-04 RX ADMIN — HYDROXYZINE HYDROCHLORIDE 25 MG: 25 TABLET ORAL at 08:42

## 2022-08-04 NOTE — DISCHARGE SUMMARY
":  1985  MRN:  [id number]  Visit Number:  [id number]      Date of Admission:2022   Date of Discharge:  2022    Discharge Diagnosis:  Principal Problem:    Schizoaffective disorder (HCC)  Active Problems:    Essential hypertension    Opioid use disorder, severe, on maintenance therapy (HCC)        Admission Diagnosis:  Psychosis (HCC) [F29]     HPI  Cintia Issa is a 37 y.o. female who was admitted on 2022 with complaints of psychosis.  For details please see H&P dated 22.    Hospital Course  Patient is a 37 y.o. female presented with psychosis. The patient was admitted to the [facility] AE1 unit for safety, further evaluation and treatment.  The patient reported she had stopped taking her psychiatric medication and her mental status deteriorated. She was previously on Abilify Maintena but had not kept her follow-up appt. She was started on oral Abilify 10 mg daily. She was also continued on MAT with buprenorphine-naloxone.  The patient was also able to take part in individual and group counseling sessions and work on appropriate coping skills.  The patient made rapid improvement in her mood and expressed feeling more positive and hopeful about future. Sleep and appetite were improved.  The day of discharge the patient was calm, cooperative and pleasant. Mood was reported to be good, and denied SI/HI/AVH. Also reported no medication side effects.        Mental Status Exam upon discharge:   Mood \"good\"   Affect-congruent, appropriate, stable  Thought Content-goal directed, no delusional material present  Thought process-linear, organized.  Suicidality: No SI  Homicidality: No HI  Perception: No AH/    Procedures Performed         Consults:   Consults     No orders found from 7/3/2022 to 2022.          Pertinent Test Results:   Admission on 2022   Component Date Value Ref Range Status   • QT Interval 2022 384  ms Final   • QTC Interval 2022 440  ms Final   • " Potassium 08/02/2022 4.3  3.5 - 5.2 mmol/L Final   • THC, Screen, Urine 08/02/2022 Negative  Negative Final   • Phencyclidine (PCP), Urine 08/02/2022 Negative  Negative Final   • Cocaine Screen, Urine 08/02/2022 Negative  Negative Final   • Methamphetamine, Ur 08/02/2022 Negative  Negative Final   • Opiate Screen 08/02/2022 Negative  Negative Final   • Amphetamine Screen, Urine 08/02/2022 Negative  Negative Final   • Benzodiazepine Screen, Urine 08/02/2022 Negative  Negative Final   • Tricyclic Antidepressants Screen 08/02/2022 Negative  Negative Final   • Methadone Screen, Urine 08/02/2022 Negative  Negative Final   • Barbiturates Screen, Urine 08/02/2022 Negative  Negative Final   • Oxycodone Screen, Urine 08/02/2022 Negative  Negative Final   • Propoxyphene Screen 08/02/2022 Negative  Negative Final   • Buprenorphine, Screen, Urine 08/02/2022 Positive (A) Negative Final   Admission on 08/01/2022, Discharged on 08/01/2022   Component Date Value Ref Range Status   • COVID19 08/01/2022 Not Detected  Not Detected - Ref. Range Final   • Influenza A PCR 08/01/2022 Not Detected  Not Detected Final   • Influenza B PCR 08/01/2022 Not Detected  Not Detected Final   • Glucose 08/01/2022 93  65 - 99 mg/dL Final   • BUN 08/01/2022 7  6 - 20 mg/dL Final   • Creatinine 08/01/2022 0.88  0.57 - 1.00 mg/dL Final   • Sodium 08/01/2022 138  136 - 145 mmol/L Final   • Potassium 08/01/2022 3.1 (A) 3.5 - 5.2 mmol/L Final   • Chloride 08/01/2022 101  98 - 107 mmol/L Final   • CO2 08/01/2022 24.3  22.0 - 29.0 mmol/L Final   • Calcium 08/01/2022 9.5  8.6 - 10.5 mg/dL Final   • Total Protein 08/01/2022 7.3  6.0 - 8.5 g/dL Final   • Albumin 08/01/2022 4.29  3.50 - 5.20 g/dL Final   • ALT (SGPT) 08/01/2022 13  1 - 33 U/L Final   • AST (SGOT) 08/01/2022 20  1 - 32 U/L Final   • Alkaline Phosphatase 08/01/2022 103  39 - 117 U/L Final   • Total Bilirubin 08/01/2022 0.3  0.0 - 1.2 mg/dL Final   • Globulin 08/01/2022 3.0  gm/dL Final   • A/G  Ratio 08/01/2022 1.4  g/dL Final   • BUN/Creatinine Ratio 08/01/2022 8.0  7.0 - 25.0 Final   • Anion Gap 08/01/2022 12.7  5.0 - 15.0 mmol/L Final   • eGFR 08/01/2022 85.9  >60.0 mL/min/1.73 Final    National Kidney Foundation and American Society of Nephrology (ASN) Task Force recommended calculation based on the Chronic Kidney Disease Epidemiology Collaboration (CKD-EPI) equation refit without adjustment for race.   • HCG, Urine QL 08/01/2022 Negative  Negative Final   • Color, UA 08/01/2022 Yellow  Yellow, Straw Final   • Appearance, UA 08/01/2022 Clear  Clear Final   • pH, UA 08/01/2022 7.0  5.0 - 8.0 Final   • Specific Gravity, UA 08/01/2022 <=1.005  1.005 - 1.030 Final   • Glucose, UA 08/01/2022 Negative  Negative Final   • Ketones, UA 08/01/2022 Negative  Negative Final   • Bilirubin, UA 08/01/2022 Negative  Negative Final   • Blood, UA 08/01/2022 Negative  Negative Final   • Protein, UA 08/01/2022 Negative  Negative Final   • Leuk Esterase, UA 08/01/2022 Trace (A) Negative Final   • Nitrite, UA 08/01/2022 Negative  Negative Final   • Urobilinogen, UA 08/01/2022 0.2 E.U./dL  0.2 - 1.0 E.U./dL Final   • Ethanol 08/01/2022 <10  0 - 10 mg/dL Final   • Ethanol % 08/01/2022 <0.010  % Final   • THC, Screen, Urine 08/01/2022 Negative  Negative Final   • Phencyclidine (PCP), Urine 08/01/2022 Negative  Negative Final   • Cocaine Screen, Urine 08/01/2022 Negative  Negative Final   • Methamphetamine, Ur 08/01/2022 Negative  Negative Final   • Opiate Screen 08/01/2022 Negative  Negative Final   • Amphetamine Screen, Urine 08/01/2022 Negative  Negative Final   • Benzodiazepine Screen, Urine 08/01/2022 Negative  Negative Final   • Tricyclic Antidepressants Screen 08/01/2022 Negative  Negative Final   • Methadone Screen, Urine 08/01/2022 Negative  Negative Final   • Barbiturates Screen, Urine 08/01/2022 Negative  Negative Final   • Oxycodone Screen, Urine 08/01/2022 Negative  Negative Final   • Propoxyphene Screen 08/01/2022  Negative  Negative Final   • Buprenorphine, Screen, Urine 08/01/2022 Negative  Negative Final   • Magnesium 08/01/2022 1.9  1.6 - 2.6 mg/dL Final   • WBC 08/01/2022 9.64  3.40 - 10.80 10*3/mm3 Final   • RBC 08/01/2022 4.17  3.77 - 5.28 10*6/mm3 Final   • Hemoglobin 08/01/2022 12.2  12.0 - 15.9 g/dL Final   • Hematocrit 08/01/2022 35.7  34.0 - 46.6 % Final   • MCV 08/01/2022 85.6  79.0 - 97.0 fL Final   • MCH 08/01/2022 29.3  26.6 - 33.0 pg Final   • MCHC 08/01/2022 34.2  31.5 - 35.7 g/dL Final   • RDW 08/01/2022 13.3  12.3 - 15.4 % Final   • RDW-SD 08/01/2022 41.4  37.0 - 54.0 fl Final   • MPV 08/01/2022 9.2  6.0 - 12.0 fL Final   • Platelets 08/01/2022 341  140 - 450 10*3/mm3 Final   • Neutrophil % 08/01/2022 64.0  42.7 - 76.0 % Final   • Lymphocyte % 08/01/2022 27.1  19.6 - 45.3 % Final   • Monocyte % 08/01/2022 7.4  5.0 - 12.0 % Final   • Eosinophil % 08/01/2022 1.1  0.3 - 6.2 % Final   • Basophil % 08/01/2022 0.2  0.0 - 1.5 % Final   • Immature Grans % 08/01/2022 0.2  0.0 - 0.5 % Final   • Neutrophils, Absolute 08/01/2022 6.17  1.70 - 7.00 10*3/mm3 Final   • Lymphocytes, Absolute 08/01/2022 2.61  0.70 - 3.10 10*3/mm3 Final   • Monocytes, Absolute 08/01/2022 0.71  0.10 - 0.90 10*3/mm3 Final   • Eosinophils, Absolute 08/01/2022 0.11  0.00 - 0.40 10*3/mm3 Final   • Basophils, Absolute 08/01/2022 0.02  0.00 - 0.20 10*3/mm3 Final   • Immature Grans, Absolute 08/01/2022 0.02  0.00 - 0.05 10*3/mm3 Final   • nRBC 08/01/2022 0.0  0.0 - 0.2 /100 WBC Final   • RBC, UA 08/01/2022 0-2  None Seen, 0-2 /HPF Final   • WBC, UA 08/01/2022 0-2  None Seen, 0-2 /HPF Final   • Bacteria, UA 08/01/2022 None Seen  None Seen /HPF Final   • Squamous Epithelial Cells, UA 08/01/2022 3-6 (A) None Seen, 0-2 /HPF Final   • Hyaline Casts, UA 08/01/2022 None Seen  None Seen /LPF Final   • Methodology 08/01/2022 Automated Microscopy   Final   Admission on 07/28/2022, Discharged on 07/28/2022   Component Date Value Ref Range Status   • Glucose  07/28/2022 102 (A) 65 - 99 mg/dL Final   • BUN 07/28/2022 4 (A) 6 - 20 mg/dL Final   • Creatinine 07/28/2022 0.88  0.57 - 1.00 mg/dL Final   • Sodium 07/28/2022 139  136 - 145 mmol/L Final   • Potassium 07/28/2022 3.4 (A) 3.5 - 5.2 mmol/L Final   • Chloride 07/28/2022 105  98 - 107 mmol/L Final   • CO2 07/28/2022 22.4  22.0 - 29.0 mmol/L Final   • Calcium 07/28/2022 10.0  8.6 - 10.5 mg/dL Final   • Total Protein 07/28/2022 7.8  6.0 - 8.5 g/dL Final   • Albumin 07/28/2022 4.49  3.50 - 5.20 g/dL Final   • ALT (SGPT) 07/28/2022 14  1 - 33 U/L Final   • AST (SGOT) 07/28/2022 14  1 - 32 U/L Final   • Alkaline Phosphatase 07/28/2022 98  39 - 117 U/L Final   • Total Bilirubin 07/28/2022 0.2  0.0 - 1.2 mg/dL Final   • Globulin 07/28/2022 3.3  gm/dL Final   • A/G Ratio 07/28/2022 1.4  g/dL Final   • BUN/Creatinine Ratio 07/28/2022 4.5 (A) 7.0 - 25.0 Final   • Anion Gap 07/28/2022 11.6  5.0 - 15.0 mmol/L Final   • eGFR 07/28/2022 85.9  >60.0 mL/min/1.73 Final    National Kidney Foundation and American Society of Nephrology (ASN) Task Force recommended calculation based on the Chronic Kidney Disease Epidemiology Collaboration (CKD-EPI) equation refit without adjustment for race.   • Color, UA 07/28/2022 Yellow  Yellow, Straw Final   • Appearance, UA 07/28/2022 Clear  Clear Final   • pH, UA 07/28/2022 7.0  5.0 - 8.0 Final   • Specific Gravity, UA 07/28/2022 <=1.005  1.005 - 1.030 Final   • Glucose, UA 07/28/2022 Negative  Negative Final   • Ketones, UA 07/28/2022 Negative  Negative Final   • Bilirubin, UA 07/28/2022 Negative  Negative Final   • Blood, UA 07/28/2022 Negative  Negative Final   • Protein, UA 07/28/2022 Negative  Negative Final   • Leuk Esterase, UA 07/28/2022 Trace (A) Negative Final   • Nitrite, UA 07/28/2022 Negative  Negative Final   • Urobilinogen, UA 07/28/2022 0.2 E.U./dL  0.2 - 1.0 E.U./dL Final   • Troponin T 07/28/2022 <0.010  0.000 - 0.030 ng/mL Final   • proBNP 07/28/2022 12.5  0.0 - 450.0 pg/mL Final    • QT Interval 07/28/2022 392  ms Final   • QTC Interval 07/28/2022 431  ms Final   • WBC 07/28/2022 8.81  3.40 - 10.80 10*3/mm3 Final   • RBC 07/28/2022 4.66  3.77 - 5.28 10*6/mm3 Final   • Hemoglobin 07/28/2022 13.1  12.0 - 15.9 g/dL Final   • Hematocrit 07/28/2022 40.0  34.0 - 46.6 % Final   • MCV 07/28/2022 85.8  79.0 - 97.0 fL Final   • MCH 07/28/2022 28.1  26.6 - 33.0 pg Final   • MCHC 07/28/2022 32.8  31.5 - 35.7 g/dL Final   • RDW 07/28/2022 13.3  12.3 - 15.4 % Final   • RDW-SD 07/28/2022 41.6  37.0 - 54.0 fl Final   • MPV 07/28/2022 9.7  6.0 - 12.0 fL Final   • Platelets 07/28/2022 323  140 - 450 10*3/mm3 Final   • Neutrophil % 07/28/2022 61.0  42.7 - 76.0 % Final   • Lymphocyte % 07/28/2022 31.8  19.6 - 45.3 % Final   • Monocyte % 07/28/2022 5.8  5.0 - 12.0 % Final   • Eosinophil % 07/28/2022 0.9  0.3 - 6.2 % Final   • Basophil % 07/28/2022 0.3  0.0 - 1.5 % Final   • Immature Grans % 07/28/2022 0.2  0.0 - 0.5 % Final   • Neutrophils, Absolute 07/28/2022 5.37  1.70 - 7.00 10*3/mm3 Final   • Lymphocytes, Absolute 07/28/2022 2.80  0.70 - 3.10 10*3/mm3 Final   • Monocytes, Absolute 07/28/2022 0.51  0.10 - 0.90 10*3/mm3 Final   • Eosinophils, Absolute 07/28/2022 0.08  0.00 - 0.40 10*3/mm3 Final   • Basophils, Absolute 07/28/2022 0.03  0.00 - 0.20 10*3/mm3 Final   • Immature Grans, Absolute 07/28/2022 0.02  0.00 - 0.05 10*3/mm3 Final   • nRBC 07/28/2022 0.0  0.0 - 0.2 /100 WBC Final   • Extra Tube 07/28/2022 Hold for add-ons.   Final    Auto resulted.   • Extra Tube 07/28/2022 hold for add-on   Final    Auto resulted   • Extra Tube 07/28/2022 Hold for add-ons.   Final    Auto resulted.   • Extra Tube 07/28/2022 Hold for add-ons.   Final    Auto resulted   • TSH 07/28/2022 1.050  0.270 - 4.200 uIU/mL Final   • Ethanol 07/28/2022 <10  0 - 10 mg/dL Final   • Ethanol % 07/28/2022 <0.010  % Final   • THC, Screen, Urine 07/28/2022 Negative  Negative Final   • Phencyclidine (PCP), Urine 07/28/2022 Negative  Negative  Final   • Cocaine Screen, Urine 07/28/2022 Negative  Negative Final   • Methamphetamine, Ur 07/28/2022 Negative  Negative Final   • Opiate Screen 07/28/2022 Negative  Negative Final   • Amphetamine Screen, Urine 07/28/2022 Positive (A) Negative Final   • Benzodiazepine Screen, Urine 07/28/2022 Negative  Negative Final   • Tricyclic Antidepressants Screen 07/28/2022 Negative  Negative Final   • Methadone Screen, Urine 07/28/2022 Negative  Negative Final   • Barbiturates Screen, Urine 07/28/2022 Negative  Negative Final   • Oxycodone Screen, Urine 07/28/2022 Negative  Negative Final   • Propoxyphene Screen 07/28/2022 Negative  Negative Final   • Buprenorphine, Screen, Urine 07/28/2022 Positive (A) Negative Final   • Magnesium 07/28/2022 2.1  1.6 - 2.6 mg/dL Final   • COVID19 07/28/2022 Not Detected  Not Detected - Ref. Range Final   • Influenza A PCR 07/28/2022 Not Detected  Not Detected Final   • Influenza B PCR 07/28/2022 Not Detected  Not Detected Final   • RBC, UA 07/28/2022 None Seen  None Seen, 0-2 /HPF Final   • WBC, UA 07/28/2022 6-12 (A) None Seen, 0-2 /HPF Final   • Bacteria, UA 07/28/2022 None Seen  None Seen /HPF Final   • Squamous Epithelial Cells, UA 07/28/2022 0-2  None Seen, 0-2 /HPF Final   • Hyaline Casts, UA 07/28/2022 None Seen  None Seen /LPF Final   • Methodology 07/28/2022 Automated Microscopy   Final        Condition on Discharge:  improved    Vital Signs  Temp:  [97.8 °F (36.6 °C)-98.8 °F (37.1 °C)] 98.8 °F (37.1 °C)  Heart Rate:  [89-96] 96  Resp:  [18] 18  BP: ()/(52-81) 98/52      Discharge Disposition:  Home or Self Care    Discharge Medications:     Discharge Medications      New Medications      Instructions Start Date   ARIPiprazole 10 MG tablet  Commonly known as: ABILIFY   10 mg, Oral, Daily   Start Date: August 5, 2022        Continue These Medications      Instructions Start Date   buprenorphine-naloxone 8-2 MG per SL tablet  Commonly known as: SUBOXONE   1 tablet, Sublingual,  Daily      hydrOXYzine pamoate 25 MG capsule  Commonly known as: VISTARIL   25 mg, Oral, Every 6 Hours PRN         Stop These Medications    sulfamethoxazole-trimethoprim 800-160 MG per tablet  Commonly known as: BACTRIM DS,SEPTRA DS            Discharge Diet: Regular      Activity at Discharge: As tolerated     Follow-up Appointments  Future Appointments   Date Time Provider Department Center   9/7/2022  9:00 AM Francia Grace APRN [facility] None       Time spent in discharge: > 30 min    Clinician:   Mario Lin MD  08/04/22  14:18 EDT

## 2022-08-04 NOTE — PROGRESS NOTES
"INPATIENT PSYCHIATRIC PROGRESS NOTE    Name:  Cintia Issa  :  1985  MRN:  [id number]  Visit Number:  [id number]  Length of stay:  3    SUBJECTIVE    CC/Focus of Exam: psychosis    INTERVAL HISTORY:  The patient states she is feeling better and is not experiencing any depression, anxiety or psychosis. She feels she is ready to go home and continue outpatient treatment.  Depression rating 1/10  Anxiety rating 1/10  Sleep: not too good  Withdrawal sx: body aches  Cravin/10    Review of Systems   Constitutional: Negative.    Respiratory: Negative.    Cardiovascular: Negative.    Gastrointestinal: Negative.    Musculoskeletal: Negative.        OBJECTIVE    Temp:  [97.8 °F (36.6 °C)-98.8 °F (37.1 °C)] 98.8 °F (37.1 °C)  Heart Rate:  [89-96] 96  Resp:  [18] 18  BP: ()/(52-81) 98/52    MENTAL STATUS EXAM:  Appearance:Casually dressed, good hygeine.   Cooperation:Cooperative  Psychomotor: No psychomotor agitation/retardation, No EPS, No motor tics  Speech-normal rate, amount.  Mood \"good\"   Affect-congruent, appropriate, stable  Thought Content-goal directed, no delusional material present  Thought process-linear, organized.  Suicidality: No SI  Homicidality: No HI  Perception: No AH/VH  Insight-fair   Judgement-fair    Lab Results (last 24 hours)     ** No results found for the last 24 hours. **             Imaging Results (Last 24 Hours)     ** No results found for the last 24 hours. **             ECG/EMG Results (most recent)     Procedure Component Value Units Date/Time    ECG 12 Lead [[id number]] Collected: 22     Updated: 22     QT Interval 384 ms      QTC Interval 440 ms     Narrative:      Test Reason : Baseline Cardiac Status  Blood Pressure :   */*   mmHG  Vent. Rate :  79 BPM     Atrial Rate :  79 BPM     P-R Int : 124 ms          QRS Dur :  86 ms      QT Int : 384 ms       P-R-T Axes :  69  66  72 degrees     QTc Int : 440 ms    Normal sinus rhythm with sinus " arrhythmia  Normal ECG  Confirmed by Gurpreet Mcmullen ([id number]) on 8/2/2022 7:34:36 PM    Referred By:            Confirmed By: Gurpreet Mcmullen           ALLERGIES: Elavil [amitriptyline]      Current Facility-Administered Medications:   •  acetaminophen (TYLENOL) tablet 650 mg, 650 mg, Oral, Q6H PRN, Naya Sprague MD  •  aluminum-magnesium hydroxide-simethicone (MAALOX MAX) 400-400-40 MG/5ML suspension 15 mL, 15 mL, Oral, Q6H PRN, Naya Sprague MD  •  ARIPiprazole (ABILIFY) tablet 10 mg, 10 mg, Oral, Daily, Mario Lin MD, 10 mg at 08/04/22 0834  •  benzonatate (TESSALON) capsule 100 mg, 100 mg, Oral, TID PRN, Darcie, Naya Dang MD  •  benztropine (COGENTIN) tablet 2 mg, 2 mg, Oral, Once PRN **OR** benztropine (COGENTIN) injection 1 mg, 1 mg, Intramuscular, Once PRN, Naya Sprague MD  •  buprenorphine-naloxone (SUBOXONE) 8-2 MG per SL tablet 1 tablet, 1 tablet, Sublingual, Daily, Mario Lin MD, 1 tablet at 08/04/22 0834  •  famotidine (PEPCID) tablet 20 mg, 20 mg, Oral, BID PRN, Naya Sprague MD  •  hydrOXYzine (ATARAX) tablet 25 mg, 25 mg, Oral, TID PRN, Mario Lin MD, 25 mg at 08/04/22 0842  •  ibuprofen (ADVIL,MOTRIN) tablet 400 mg, 400 mg, Oral, Q6H PRN, Naya Sprague MD, 400 mg at 08/03/22 1821  •  loperamide (IMODIUM) capsule 2 mg, 2 mg, Oral, Q2H PRN, Naya Sprague MD  •  magnesium hydroxide (MILK OF MAGNESIA) suspension 10 mL, 10 mL, Oral, Daily PRN, Naya Sprague MD  •  nicotine (NICODERM CQ) 21 MG/24HR patch 1 patch, 1 patch, Transdermal, Q24H, Naya Sprague MD, 1 patch at 08/04/22 0834  •  ondansetron (ZOFRAN) tablet 4 mg, 4 mg, Oral, Q6H PRN, Naya Sprague MD  •  potassium chloride (K-DUR,KLOR-CON) ER tablet 40 mEq, 40 mEq, Oral, PRN, Naya Sprague MD  •  sodium chloride nasal spray 2 spray, 2 spray, Each Nare, PRN, Naya Sprague MD  •  traZODone (DESYREL) tablet 50 mg, 50 mg, Oral, Nightly PRN, Naya Sprague,  MD    ASSESSMENT & PLAN:      Schizoaffective disorder (HCC)  - Continue Abilify 10 mg daily       Essential hypertension  - Patient was previously on metoprolol. Currently blood pressure is normal. Continue to monitor       Opioid use disorder, severe, on maintenance therapy (HCC)  - Continue MAT       Hypokalemia  - Replacement per protocol       Nicotine use disorder  - Nicotine replacement  - Encourage cessation    Special precautions: Special Precautions Level 3 (q15 min checks) .    Behavioral Health Treatment Plan and Problem List: I have reviewed and approved the Behavioral Health Treatment Plan and Problem list.  The patient has had a chance to review and agrees with the treatment plan.     Clinician:  Mario Lin MD  08/04/22  14:13 EDT

## 2022-08-04 NOTE — DISCHARGE INSTR - APPOINTMENTS
[facility]  [address] [place] [address]  [phone]    August 9, 2022 at 3:00pm with Dr. Kennedy   August 10, 2022 at 10:30am with Lindsey

## 2022-08-04 NOTE — PLAN OF CARE
Goal Outcome Evaluation:  Plan of Care Reviewed With: patient  Patient Agreement with Plan of Care: agrees        Pt states anxiety 4, depression 2. She states she has trouble falling asleep. She says she has visual hallucinations. She is calm and cooperative with staff, med compliant.

## 2022-08-04 NOTE — PLAN OF CARE
Goal Outcome Evaluation:  Plan of Care Reviewed With: patient  Patient Agreement with Plan of Care: agrees     Progress: improving  Outcome Evaluation: Patient calm and cooperative. Rates anxiety and depression both a 2. Denies SI/HI or AVH. Patient is being discharged home today.

## 2022-08-04 NOTE — PLAN OF CARE
DATA:      Therapist discussed case with Dr. Lin and met with patient today to review coping skills, review plan of care, and discuss discharge.    Therapist made patient an appointment with her therapist and suboxone provider.      Clinical Maneuvering/Intervention:     Therapist assisted patient in processing above session content; acknowledged and normalized patient’s thoughts, feelings, and concerns.  Discussed the therapist/patient relationship and explain the parameters and limitations of relative confidentiality.  Also discussed the importance of active participation, and honesty to the treatment process.  Encouraged the patient to discuss/vent their feelings, frustrations, and fears concerning their ongoing medical issues and validated their feelings.     Allowed patient to freely discuss issues without interruption or judgment. Provided safe, confidential environment to facilitate the development of positive therapeutic relationship and encourage open, honest communication.      Therapist addressed discharge safety planning this date. Assisted patient in identifying risk factors which would indicate the need for higher level of care after discharge;  including thoughts to harm self or others and/or self-harming behavior. Encouraged patient to call 911, or present to the nearest emergency room should any of these events occur. Discussed crisis intervention services and means to access.  Encouraged securing any objects of harm.    Therapist completed integrated summary, treatment plan, and initiated social history this date.  Therapist is strongly encouraging family involvement in treatment.       Encouraged mask wearing, social distancing, and regular hand washing due to COVID19 risk.      ASSESSMENT:      Therapist met 1:1 with patient today. Patient denies suicidal ideation. Patient denies homicidal ideation. Patient denies AVH. Patient states she feels more like herself today. She is more reserved  and restricted today, though. Patient denies depression and anxiety. She reports she is thinking about staying with her mom or cousin after she leaves the hospital. Patient reports that after she stopped taking her medications, she started feeling afraid of staying at her house alone. Patient reports she feels like she is getting close to being ready for discharge.     PLAN:       Patient to remain hospitalized this date.      Treatment team will focus efforts on stabilizing patient's acute symptoms while providing education on healthy coping and crisis management to reduce hospitalizations.   Patient requires daily psychiatrist evaluation and 24/7 nursing supervision to promote patient  safety.     Therapist will offer 1-4 individual sessions, 1 therapy group daily, family education, and appropriate referral.

## 2022-08-06 ENCOUNTER — HOSPITAL ENCOUNTER (EMERGENCY)
Facility: HOSPITAL | Age: 37
Discharge: LEFT BEFORE DISCHARGE | End: 2022-08-06

## 2022-08-06 VITALS
BODY MASS INDEX: 26.99 KG/M2 | DIASTOLIC BLOOD PRESSURE: 80 MMHG | RESPIRATION RATE: 14 BRPM | OXYGEN SATURATION: 99 % | HEART RATE: 88 BPM | HEIGHT: 65 IN | SYSTOLIC BLOOD PRESSURE: 132 MMHG | WEIGHT: 162 LBS | TEMPERATURE: 98.4 F

## 2022-08-06 VITALS
TEMPERATURE: 98.3 F | HEIGHT: 66 IN | SYSTOLIC BLOOD PRESSURE: 132 MMHG | HEART RATE: 88 BPM | WEIGHT: 162 LBS | RESPIRATION RATE: 14 BRPM | BODY MASS INDEX: 26.03 KG/M2 | DIASTOLIC BLOOD PRESSURE: 80 MMHG | OXYGEN SATURATION: 99 %

## 2022-08-06 PROCEDURE — 99211 OFF/OP EST MAY X REQ PHY/QHP: CPT

## 2022-08-06 PROCEDURE — 99282 EMERGENCY DEPT VISIT SF MDM: CPT

## 2022-08-07 ENCOUNTER — HOSPITAL ENCOUNTER (EMERGENCY)
Facility: HOSPITAL | Age: 37
Discharge: HOME | End: 2022-08-07
Attending: STUDENT IN AN ORGANIZED HEALTH CARE EDUCATION/TRAINING PROGRAM | Admitting: STUDENT IN AN ORGANIZED HEALTH CARE EDUCATION/TRAINING PROGRAM

## 2022-08-07 DIAGNOSIS — F39 MOOD DISORDER: ICD-10-CM

## 2022-08-07 DIAGNOSIS — L03.119 CELLULITIS OF UPPER EXTREMITY, UNSPECIFIED LATERALITY: Primary | ICD-10-CM

## 2022-08-07 RX ORDER — DOXYCYCLINE 100 MG/1
100 CAPSULE ORAL ONCE
Status: DISCONTINUED | OUTPATIENT
Start: 2022-08-07 | End: 2022-08-07 | Stop reason: HOSPADM

## 2022-08-07 RX ORDER — DOXYCYCLINE 100 MG/1
100 CAPSULE ORAL 2 TIMES DAILY
Qty: 13 CAPSULE | Refills: 0 | OUTPATIENT
Start: 2022-08-07 | End: 2022-09-03

## 2022-08-07 RX ORDER — DIPHENHYDRAMINE HCL 50 MG
50 CAPSULE ORAL ONCE
Status: DISCONTINUED | OUTPATIENT
Start: 2022-08-07 | End: 2022-08-07 | Stop reason: HOSPADM

## 2022-08-07 NOTE — ED PROVIDER NOTES
Subjective   37-year-old female with past medical history of anxiety, depression, and schizophrenia presents to the ER due to concerns for bugs crawling into her skin.  Patient has multiple picking injuries across the upper and lower extremities.  Patient also has excoriations from scratching.  Patient denied suicidal homicidal ideations.  Patient had a recent admission to the [facility] for psychiatric evaluation.  Patient does not not wish to be evaluated by behavioral health.  Patient's family notes concern for the patient not following discharge instructions and has not been taking her psychiatric medications.  Afebrile.          Review of Systems   Skin: Positive for rash.   All other systems reviewed and are negative.      Past Medical History:   Diagnosis Date   • Anxiety    • Arthritis    • Bronchitis    • Chronic back pain    • Fibromyalgia    • Hypertension    • MDD (major depressive disorder), recurrent, severe, with psychosis ([facility]) 06/09/2018   • Panic disorder    • Psychiatric illness    • Schizoaffective disorder ([facility])    • Schizophrenia, paranoid ([facility])        Allergies   Allergen Reactions   • Elavil [Amitriptyline] Nausea Only       Past Surgical History:   Procedure Laterality Date   • WISDOM TOOTH EXTRACTION         Family History   Problem Relation Age of Onset   • Arthritis Mother    • Stroke Father    • COPD Brother    • Diabetes Maternal Grandmother    • COPD Maternal Grandfather    • Heart disease Maternal Grandfather    • Stroke Maternal Grandfather    • Anxiety disorder Neg Hx    • Depression Neg Hx    • Schizophrenia Neg Hx        Social History     Socioeconomic History   • Marital status:    • Number of children: 0   • Highest education level: High school graduate   Tobacco Use   • Smoking status: Current Every Day Smoker     Packs/day: 1.00     Years: 8.00     Pack years: 8.00     Types: Cigarettes   • Smokeless tobacco: Never Used   Vaping Use   • Vaping Use: Never used  "  Substance and Sexual Activity   • Alcohol use: No   • Drug use: No     Comment: denies   • Sexual activity: Not Currently     Partners: Male     Comment: reports she hasn't been in \"forever.\"            Objective   Physical Exam  Constitutional:       General: She is not in acute distress.     Appearance: Normal appearance. She is not ill-appearing.   HENT:      Head: Normocephalic and atraumatic.      Right Ear: External ear normal.      Left Ear: External ear normal.      Nose: Nose normal.      Mouth/Throat:      Mouth: Mucous membranes are moist.   Eyes:      Extraocular Movements: Extraocular movements intact.      Pupils: Pupils are equal, round, and reactive to light.   Cardiovascular:      Rate and Rhythm: Normal rate and regular rhythm.      Heart sounds: No murmur heard.  Pulmonary:      Effort: Pulmonary effort is normal. No respiratory distress.      Breath sounds: Normal breath sounds. No wheezing.   Abdominal:      General: Bowel sounds are normal.      Palpations: Abdomen is soft.      Tenderness: There is no abdominal tenderness.   Musculoskeletal:         General: No deformity or signs of injury. Normal range of motion.      Cervical back: Normal range of motion and neck supple.   Skin:     General: Skin is warm and dry.      Findings: No erythema.      Comments: Multiple picking lesions across the upper and lower extremities at various stages of healing.  This is likely induced by a psychiatric illness.   Neurological:      General: No focal deficit present.      Mental Status: She is alert and oriented to person, place, and time. Mental status is at baseline.      Cranial Nerves: No cranial nerve deficit.   Psychiatric:         Mood and Affect: Mood normal.         Behavior: Behavior normal.         Thought Content: Thought content normal.         Procedures           ED Course  ED Course as of 08/07/22 0107   Sun Aug 07, 2022   0107 Patient refuses behavioral health evaluation.  Cellulitis " secondary to skin picking likely.  Patient will start Doxy.  Benadryl given.  Work up and results were discussed throughly with the patient.  The patient will be discharged for further monitoring and management with their PCP.  Red flags, warning signs, worsening symptoms, and when to return to the ER discussed with and understood by the patient.  Patient will follow up with their PCP in a timely manner.  Vitals stable at discharge. [SF]      ED Course User Index  [SF] Collin Culver DO                                           MDM    Final diagnoses:   Cellulitis of upper extremity, unspecified laterality   Mood disorder (HCC)       ED Disposition  ED Disposition     ED Disposition   Discharge    Condition   Stable    Comment   --             No follow-up provider specified.       Medication List      New Prescriptions    doxycycline 100 MG capsule  Commonly known as: MONODOX  Take 1 capsule by mouth 2 (Two) Times a Day.           Where to Get Your Medications      These medications were sent to [facility] [place]  [place], [place] - [address] [place] [address] - [phone] [facility] [phone] [address] [address]    Phone: [phone]   · doxycycline 100 MG capsule          Collin Culver DO  08/07/22 0107

## 2022-08-30 ENCOUNTER — TELEPHONE (OUTPATIENT)
Dept: FAMILY MEDICINE CLINIC | Facility: CLINIC | Age: 37
End: 2022-08-30

## 2022-09-02 ENCOUNTER — APPOINTMENT (OUTPATIENT)
Dept: GENERAL RADIOLOGY | Facility: HOSPITAL | Age: 37
End: 2022-09-02

## 2022-09-02 PROCEDURE — 96372 THER/PROPH/DIAG INJ SC/IM: CPT

## 2022-09-02 PROCEDURE — 93010 ELECTROCARDIOGRAM REPORT: CPT | Performed by: INTERNAL MEDICINE

## 2022-09-02 PROCEDURE — 73030 X-RAY EXAM OF SHOULDER: CPT

## 2022-09-02 PROCEDURE — 36415 COLL VENOUS BLD VENIPUNCTURE: CPT

## 2022-09-02 PROCEDURE — 71111 X-RAY EXAM RIBS/CHEST4/> VWS: CPT

## 2022-09-02 PROCEDURE — 99283 EMERGENCY DEPT VISIT LOW MDM: CPT

## 2022-09-03 ENCOUNTER — HOSPITAL ENCOUNTER (EMERGENCY)
Facility: HOSPITAL | Age: 37
Discharge: HOME | End: 2022-09-03
Attending: STUDENT IN AN ORGANIZED HEALTH CARE EDUCATION/TRAINING PROGRAM | Admitting: STUDENT IN AN ORGANIZED HEALTH CARE EDUCATION/TRAINING PROGRAM

## 2022-09-03 VITALS
SYSTOLIC BLOOD PRESSURE: 129 MMHG | BODY MASS INDEX: 26.36 KG/M2 | DIASTOLIC BLOOD PRESSURE: 80 MMHG | HEART RATE: 83 BPM | WEIGHT: 164 LBS | OXYGEN SATURATION: 99 % | HEIGHT: 66 IN | RESPIRATION RATE: 18 BRPM | TEMPERATURE: 97.7 F

## 2022-09-03 DIAGNOSIS — S22.31XA CLOSED FRACTURE OF ONE RIB OF RIGHT SIDE, INITIAL ENCOUNTER: ICD-10-CM

## 2022-09-03 DIAGNOSIS — S46.912A SHOULDER STRAIN, LEFT, INITIAL ENCOUNTER: Primary | ICD-10-CM

## 2022-09-03 LAB
ALBUMIN SERPL-MCNC: 3.72 G/DL (ref 3.5–5.2)
ALBUMIN/GLOB SERPL: 1.3 G/DL
ALP SERPL-CCNC: 81 U/L (ref 39–117)
ALT SERPL W P-5'-P-CCNC: 11 U/L (ref 1–33)
ANION GAP SERPL CALCULATED.3IONS-SCNC: 10.4 MMOL/L (ref 5–15)
AST SERPL-CCNC: 15 U/L (ref 1–32)
BASOPHILS # BLD AUTO: 0.02 10*3/MM3 (ref 0–0.2)
BASOPHILS NFR BLD AUTO: 0.3 % (ref 0–1.5)
BILIRUB SERPL-MCNC: 0.2 MG/DL (ref 0–1.2)
BILIRUB UR QL STRIP: NEGATIVE
BUN SERPL-MCNC: 8 MG/DL (ref 6–20)
BUN/CREAT SERPL: 9.6 (ref 7–25)
CALCIUM SPEC-SCNC: 9.2 MG/DL (ref 8.6–10.5)
CHLORIDE SERPL-SCNC: 104 MMOL/L (ref 98–107)
CLARITY UR: CLEAR
CO2 SERPL-SCNC: 25.6 MMOL/L (ref 22–29)
COLOR UR: YELLOW
CREAT SERPL-MCNC: 0.83 MG/DL (ref 0.57–1)
CRP SERPL-MCNC: 0.58 MG/DL (ref 0–0.5)
DEPRECATED RDW RBC AUTO: 41.4 FL (ref 37–54)
EGFRCR SERPLBLD CKD-EPI 2021: 92.1 ML/MIN/1.73
EOSINOPHIL # BLD AUTO: 0.16 10*3/MM3 (ref 0–0.4)
EOSINOPHIL NFR BLD AUTO: 2.3 % (ref 0.3–6.2)
ERYTHROCYTE [DISTWIDTH] IN BLOOD BY AUTOMATED COUNT: 12.7 % (ref 12.3–15.4)
ERYTHROCYTE [SEDIMENTATION RATE] IN BLOOD: 28 MM/HR (ref 0–20)
GLOBULIN UR ELPH-MCNC: 2.9 GM/DL
GLUCOSE SERPL-MCNC: 74 MG/DL (ref 65–99)
GLUCOSE UR STRIP-MCNC: NEGATIVE MG/DL
HCT VFR BLD AUTO: 33.9 % (ref 34–46.6)
HGB BLD-MCNC: 11.2 G/DL (ref 12–15.9)
HGB UR QL STRIP.AUTO: NEGATIVE
HOLD SPECIMEN: NORMAL
HOLD SPECIMEN: NORMAL
IMM GRANULOCYTES # BLD AUTO: 0.01 10*3/MM3 (ref 0–0.05)
IMM GRANULOCYTES NFR BLD AUTO: 0.1 % (ref 0–0.5)
KETONES UR QL STRIP: NEGATIVE
LEUKOCYTE ESTERASE UR QL STRIP.AUTO: NEGATIVE
LYMPHOCYTES # BLD AUTO: 2.9 10*3/MM3 (ref 0.7–3.1)
LYMPHOCYTES NFR BLD AUTO: 42.5 % (ref 19.6–45.3)
MCH RBC QN AUTO: 29.3 PG (ref 26.6–33)
MCHC RBC AUTO-ENTMCNC: 33 G/DL (ref 31.5–35.7)
MCV RBC AUTO: 88.7 FL (ref 79–97)
MONOCYTES # BLD AUTO: 0.59 10*3/MM3 (ref 0.1–0.9)
MONOCYTES NFR BLD AUTO: 8.6 % (ref 5–12)
NEUTROPHILS NFR BLD AUTO: 3.15 10*3/MM3 (ref 1.7–7)
NEUTROPHILS NFR BLD AUTO: 46.2 % (ref 42.7–76)
NITRITE UR QL STRIP: NEGATIVE
NRBC BLD AUTO-RTO: 0 /100 WBC (ref 0–0.2)
PH UR STRIP.AUTO: 6 [PH] (ref 5–8)
PLATELET # BLD AUTO: 290 10*3/MM3 (ref 140–450)
PMV BLD AUTO: 8.9 FL (ref 6–12)
POTASSIUM SERPL-SCNC: 3.4 MMOL/L (ref 3.5–5.2)
PROT SERPL-MCNC: 6.6 G/DL (ref 6–8.5)
PROT UR QL STRIP: NEGATIVE
QT INTERVAL: 388 MS
QTC INTERVAL: 492 MS
RBC # BLD AUTO: 3.82 10*6/MM3 (ref 3.77–5.28)
SODIUM SERPL-SCNC: 140 MMOL/L (ref 136–145)
SP GR UR STRIP: 1.01 (ref 1–1.03)
TROPONIN T SERPL-MCNC: <0.01 NG/ML (ref 0–0.03)
UROBILINOGEN UR QL STRIP: NORMAL
WBC NRBC COR # BLD: 6.83 10*3/MM3 (ref 3.4–10.8)
WHOLE BLOOD HOLD COAG: NORMAL
WHOLE BLOOD HOLD SPECIMEN: NORMAL

## 2022-09-03 PROCEDURE — 85652 RBC SED RATE AUTOMATED: CPT | Performed by: PHYSICIAN ASSISTANT

## 2022-09-03 PROCEDURE — 93005 ELECTROCARDIOGRAM TRACING: CPT | Performed by: STUDENT IN AN ORGANIZED HEALTH CARE EDUCATION/TRAINING PROGRAM

## 2022-09-03 PROCEDURE — 86140 C-REACTIVE PROTEIN: CPT | Performed by: PHYSICIAN ASSISTANT

## 2022-09-03 PROCEDURE — 80053 COMPREHEN METABOLIC PANEL: CPT | Performed by: PHYSICIAN ASSISTANT

## 2022-09-03 PROCEDURE — 25010000002 KETOROLAC TROMETHAMINE PER 15 MG: Performed by: NURSE PRACTITIONER

## 2022-09-03 PROCEDURE — 85025 COMPLETE CBC W/AUTO DIFF WBC: CPT | Performed by: PHYSICIAN ASSISTANT

## 2022-09-03 PROCEDURE — 81003 URINALYSIS AUTO W/O SCOPE: CPT | Performed by: PHYSICIAN ASSISTANT

## 2022-09-03 PROCEDURE — 25010000002 METHYLPREDNISOLONE PER 80 MG: Performed by: NURSE PRACTITIONER

## 2022-09-03 PROCEDURE — 96372 THER/PROPH/DIAG INJ SC/IM: CPT

## 2022-09-03 PROCEDURE — 84484 ASSAY OF TROPONIN QUANT: CPT | Performed by: PHYSICIAN ASSISTANT

## 2022-09-03 RX ORDER — METHYLPREDNISOLONE ACETATE 80 MG/ML
40 INJECTION, SUSPENSION INTRA-ARTICULAR; INTRALESIONAL; INTRAMUSCULAR; SOFT TISSUE ONCE
Status: COMPLETED | OUTPATIENT
Start: 2022-09-03 | End: 2022-09-03

## 2022-09-03 RX ORDER — CYCLOBENZAPRINE HCL 10 MG
10 TABLET ORAL 3 TIMES DAILY PRN
Qty: 30 TABLET | Refills: 0 | Status: SHIPPED | OUTPATIENT
Start: 2022-09-03 | End: 2022-09-13

## 2022-09-03 RX ORDER — KETOROLAC TROMETHAMINE 30 MG/ML
60 INJECTION, SOLUTION INTRAMUSCULAR; INTRAVENOUS ONCE
Status: COMPLETED | OUTPATIENT
Start: 2022-09-03 | End: 2022-09-03

## 2022-09-03 RX ADMIN — KETOROLAC TROMETHAMINE 60 MG: 30 INJECTION, SOLUTION INTRAMUSCULAR; INTRAVENOUS at 03:11

## 2022-09-03 RX ADMIN — METHYLPREDNISOLONE ACETATE 40 MG: 80 INJECTION, SUSPENSION INTRA-ARTICULAR; INTRALESIONAL; INTRAMUSCULAR; SOFT TISSUE at 03:10

## 2022-09-03 NOTE — ED NOTES
MEDICAL SCREENING:    Reason for Visit: Pt c/o of rib pain and shoulder pain. She denies chest pain or SOA.     Patient initially seen in triage.  The patient was advised further evaluation and diagnostic testing will be needed, some of the treatment and testing will be initiated in the lobby in order to begin the process.  The patient will be returned to the waiting area for the time being and possibly be re-assessed by a subsequent ED provider.  The patient will be brought back to the treatment area in as timely manner as possible.         Lindsey Gonzalez PA-C  09/02/22 3062

## 2022-09-03 NOTE — ED PROVIDER NOTES
Subjective   Patient is a 37 year old female with past medical history significant for anxiety, panic disorder, hypertension, fibromyalgia, schizophrenia, schizoaffective disorder, substance abuse.  She presents to the ED today with complaints of left shoulder pain and right-sided rib pain.  She denies any fall or any known injury.  She states that she has not had an injury to her left shoulder.  She states that she was twisting in her car to do something and felt her right rib pop.  She states that since then she has had rib pain.  She denies any other significant complaints.          Review of Systems   Constitutional: Negative.  Negative for fever.   HENT: Negative.    Eyes: Negative.    Respiratory: Negative.    Cardiovascular: Negative.  Negative for chest pain.   Gastrointestinal: Negative.  Negative for abdominal pain.   Endocrine: Negative.    Genitourinary: Negative.  Negative for dysuria.   Musculoskeletal:        Positive for rib pain.  Positive for left shoulder pain.   Skin: Negative.    Allergic/Immunologic: Negative.    Neurological: Negative.    Hematological: Negative.    Psychiatric/Behavioral: Negative.    All other systems reviewed and are negative.      Past Medical History:   Diagnosis Date   • Anxiety    • Arthritis    • Bronchitis    • Chronic back pain    • Fibromyalgia    • Hypertension    • MDD (major depressive disorder), recurrent, severe, with psychosis (HCC) 06/09/2018   • Panic disorder    • Psychiatric illness    • Schizoaffective disorder (HCC)    • Schizophrenia, paranoid ([facility])        Allergies   Allergen Reactions   • Elavil [Amitriptyline] Nausea Only       Past Surgical History:   Procedure Laterality Date   • WISDOM TOOTH EXTRACTION         Family History   Problem Relation Age of Onset   • Arthritis Mother    • Stroke Father    • COPD Brother    • Diabetes Maternal Grandmother    • COPD Maternal Grandfather    • Heart disease Maternal Grandfather    • Stroke Maternal  "Grandfather    • Anxiety disorder Neg Hx    • Depression Neg Hx    • Schizophrenia Neg Hx        Social History     Socioeconomic History   • Marital status:    • Number of children: 0   • Highest education level: High school graduate   Tobacco Use   • Smoking status: Current Every Day Smoker     Packs/day: 1.00     Years: 8.00     Pack years: 8.00     Types: Cigarettes   • Smokeless tobacco: Never Used   Vaping Use   • Vaping Use: Never used   Substance and Sexual Activity   • Alcohol use: No   • Drug use: No     Comment: denies   • Sexual activity: Not Currently     Partners: Male     Comment: reports she hasn't been in \"forever.\"            Objective   Physical Exam  Vitals and nursing note reviewed.   Constitutional:       General: She is not in acute distress.     Appearance: She is well-developed. She is not diaphoretic.   HENT:      Head: Normocephalic and atraumatic.      Right Ear: External ear normal.      Left Ear: External ear normal.      Nose: Nose normal.   Eyes:      Conjunctiva/sclera: Conjunctivae normal.      Pupils: Pupils are equal, round, and reactive to light.   Neck:      Vascular: No JVD.      Trachea: No tracheal deviation.   Cardiovascular:      Rate and Rhythm: Normal rate and regular rhythm.      Heart sounds: Normal heart sounds. No murmur heard.  Pulmonary:      Effort: Pulmonary effort is normal. No respiratory distress.      Breath sounds: Normal breath sounds. No wheezing.   Chest:       Abdominal:      General: Bowel sounds are normal.      Palpations: Abdomen is soft.      Tenderness: There is no abdominal tenderness.   Musculoskeletal:         General: No swelling or deformity. Normal range of motion.      Left shoulder: Tenderness present. No swelling or deformity. Normal range of motion.      Cervical back: Normal range of motion and neck supple.   Skin:     General: Skin is warm and dry.      Coloration: Skin is not pale.      Findings: No erythema or rash. "   Neurological:      Mental Status: She is alert and oriented to person, place, and time.      Cranial Nerves: No cranial nerve deficit.   Psychiatric:         Behavior: Behavior normal.         Thought Content: Thought content normal.         Procedures       Results for orders placed or performed during the hospital encounter of 09/03/22   Comprehensive Metabolic Panel    Specimen: Arm, Right; Blood   Result Value Ref Range    Glucose 74 65 - 99 mg/dL    BUN 8 6 - 20 mg/dL    Creatinine 0.83 0.57 - 1.00 mg/dL    Sodium 140 136 - 145 mmol/L    Potassium 3.4 (L) 3.5 - 5.2 mmol/L    Chloride 104 98 - 107 mmol/L    CO2 25.6 22.0 - 29.0 mmol/L    Calcium 9.2 8.6 - 10.5 mg/dL    Total Protein 6.6 6.0 - 8.5 g/dL    Albumin 3.72 3.50 - 5.20 g/dL    ALT (SGPT) 11 1 - 33 U/L    AST (SGOT) 15 1 - 32 U/L    Alkaline Phosphatase 81 39 - 117 U/L    Total Bilirubin 0.2 0.0 - 1.2 mg/dL    Globulin 2.9 gm/dL    A/G Ratio 1.3 g/dL    BUN/Creatinine Ratio 9.6 7.0 - 25.0    Anion Gap 10.4 5.0 - 15.0 mmol/L    eGFR 92.1 >60.0 mL/min/1.73   Troponin    Specimen: Arm, Right; Blood   Result Value Ref Range    Troponin T <0.010 0.000 - 0.030 ng/mL   C-reactive Protein    Specimen: Arm, Right; Blood   Result Value Ref Range    C-Reactive Protein 0.58 (H) 0.00 - 0.50 mg/dL   Sedimentation Rate    Specimen: Arm, Right; Blood   Result Value Ref Range    Sed Rate 28 (H) 0 - 20 mm/hr   Urinalysis With Microscopic If Indicated (No Culture) - Urine, Clean Catch    Specimen: Urine, Clean Catch   Result Value Ref Range    Color, UA Yellow Yellow, Straw    Appearance, UA Clear Clear    pH, UA 6.0 5.0 - 8.0    Specific Gravity, UA 1.009 1.005 - 1.030    Glucose, UA Negative Negative    Ketones, UA Negative Negative    Bilirubin, UA Negative Negative    Blood, UA Negative Negative    Protein, UA Negative Negative    Leuk Esterase, UA Negative Negative    Nitrite, UA Negative Negative    Urobilinogen, UA 0.2 E.U./dL 0.2 - 1.0 E.U./dL   CBC Auto  Differential    Specimen: Arm, Right; Blood   Result Value Ref Range    WBC 6.83 3.40 - 10.80 10*3/mm3    RBC 3.82 3.77 - 5.28 10*6/mm3    Hemoglobin 11.2 (L) 12.0 - 15.9 g/dL    Hematocrit 33.9 (L) 34.0 - 46.6 %    MCV 88.7 79.0 - 97.0 fL    MCH 29.3 26.6 - 33.0 pg    MCHC 33.0 31.5 - 35.7 g/dL    RDW 12.7 12.3 - 15.4 %    RDW-SD 41.4 37.0 - 54.0 fl    MPV 8.9 6.0 - 12.0 fL    Platelets 290 140 - 450 10*3/mm3    Neutrophil % 46.2 42.7 - 76.0 %    Lymphocyte % 42.5 19.6 - 45.3 %    Monocyte % 8.6 5.0 - 12.0 %    Eosinophil % 2.3 0.3 - 6.2 %    Basophil % 0.3 0.0 - 1.5 %    Immature Grans % 0.1 0.0 - 0.5 %    Neutrophils, Absolute 3.15 1.70 - 7.00 10*3/mm3    Lymphocytes, Absolute 2.90 0.70 - 3.10 10*3/mm3    Monocytes, Absolute 0.59 0.10 - 0.90 10*3/mm3    Eosinophils, Absolute 0.16 0.00 - 0.40 10*3/mm3    Basophils, Absolute 0.02 0.00 - 0.20 10*3/mm3    Immature Grans, Absolute 0.01 0.00 - 0.05 10*3/mm3    nRBC 0.0 0.0 - 0.2 /100 WBC   Green Top (Gel)   Result Value Ref Range    Extra Tube Hold for add-ons.    Lavender Top   Result Value Ref Range    Extra Tube hold for add-on    Gold Top - SST   Result Value Ref Range    Extra Tube Hold for add-ons.    Light Blue Top   Result Value Ref Range    Extra Tube Hold for add-ons.        XR Shoulder 2+ View Left   Final Result   No shoulder fracture or dislocation.      Signer Name: Douglas Castillo MD    Signed: 9/2/2022 10:38 PM    Workstation Name: [id number]     [facility]      XR Ribs Bilateral 4+ View With PA Chest   Final Result   Mildly displaced right lateral third rib fracture. No pneumothorax.      Signer Name: Douglas Castillo MD    Signed: 9/2/2022 10:37 PM    Workstation Name: [id number]     [facility]          ED Course  ED Course as of 09/03/22 0322   Fri Sep 02, 2022   2043 EKG noted sinus rhythm.  97 bpm.  .  QTc 402.  No acute ST elevation [SF]      ED Course User Index  [SF] Collin Culver DO                                            MDM    Final diagnoses:   Shoulder strain, left, initial encounter   Closed fracture of one rib of right side, initial encounter       ED Disposition  ED Disposition     ED Disposition   Discharge    Condition   Stable    Comment   --             Neal Garcia MD  [address] [facility]  [facility] [id number] [phone]    Call in 2 days      Delano Peace MD  [address]  [place] [address]  [phone]    Call in 4 days           Medication List      New Prescriptions    cyclobenzaprine 10 MG tablet  Commonly known as: FLEXERIL  Take 1 tablet by mouth 3 (Three) Times a Day As Needed for Muscle Spasms for up to 10 days.        Stop    doxycycline 100 MG capsule  Commonly known as: MONODOX           Where to Get Your Medications      These medications were sent to [facility] [place]  [place], [place] - [address] [place] [address] - [phone] [facility] [phone] [address] [address]    Phone: [phone]   · cyclobenzaprine 10 MG tablet          Yolanda Serrano, ROBIN  09/03/22 0322

## 2022-09-03 NOTE — ED NOTES
Patient unable to be located after multiple attempts. Patient left without an explanation of risks and benefits and without signing proper paperwork.

## 2022-09-07 ENCOUNTER — PRIOR AUTHORIZATION (OUTPATIENT)
Dept: PSYCHIATRY | Facility: CLINIC | Age: 37
End: 2022-09-07

## 2022-09-07 ENCOUNTER — OFFICE VISIT (OUTPATIENT)
Dept: PSYCHIATRY | Facility: CLINIC | Age: 37
End: 2022-09-07

## 2022-09-07 VITALS
DIASTOLIC BLOOD PRESSURE: 95 MMHG | TEMPERATURE: 98 F | WEIGHT: 160.6 LBS | OXYGEN SATURATION: 100 % | HEART RATE: 110 BPM | BODY MASS INDEX: 25.81 KG/M2 | HEIGHT: 66 IN | SYSTOLIC BLOOD PRESSURE: 141 MMHG

## 2022-09-07 DIAGNOSIS — F25.0 SCHIZOAFFECTIVE DISORDER, BIPOLAR TYPE: Primary | ICD-10-CM

## 2022-09-07 DIAGNOSIS — F41.1 GENERALIZED ANXIETY DISORDER: ICD-10-CM

## 2022-09-07 DIAGNOSIS — F19.11 HISTORY OF SUBSTANCE ABUSE: ICD-10-CM

## 2022-09-07 DIAGNOSIS — F51.05 INSOMNIA DUE TO MENTAL CONDITION: ICD-10-CM

## 2022-09-07 DIAGNOSIS — Z79.899 MEDICATION MANAGEMENT: ICD-10-CM

## 2022-09-07 PROCEDURE — 90792 PSYCH DIAG EVAL W/MED SRVCS: CPT | Performed by: NURSE PRACTITIONER

## 2022-09-07 RX ORDER — ARIPIPRAZOLE 10 MG/1
10 TABLET ORAL DAILY
Qty: 14 TABLET | Refills: 0 | Status: ON HOLD | OUTPATIENT
Start: 2022-09-07 | End: 2022-10-01

## 2022-09-07 RX ORDER — MIRTAZAPINE 15 MG/1
15 TABLET, FILM COATED ORAL NIGHTLY
Qty: 30 TABLET | Refills: 1 | Status: SHIPPED | OUTPATIENT
Start: 2022-09-07 | End: 2022-10-26 | Stop reason: SDUPTHER

## 2022-09-07 RX ORDER — HYDROXYZINE PAMOATE 25 MG/1
25 CAPSULE ORAL EVERY 6 HOURS PRN
Qty: 90 CAPSULE | Refills: 1 | Status: SHIPPED | OUTPATIENT
Start: 2022-09-07 | End: 2022-10-05 | Stop reason: HOSPADM

## 2022-09-07 NOTE — PROGRESS NOTES
"Subjective   Cintia Issa is a 37 y.o. female who presents today for hospital follow up    Chief Complaint:  Schizoaffective    History of Present Illness: Patient presents as hospital follow up with mother. She is agreeable for her to remain in the room to help provide information. Patient was discharged from [facility] 8/2022 due to manic behavior. Mother states patient attempted to jump out of her car and refused to get help which led to her getting a court order for treatment. Reports she was diagnosed as \"paranoid schizo\". Patient states she was previously stable on aripiprazole injection 400 mg. States she forgot to call for refills and never restarted it. States during that time she was struggling with substance use also. Reports she is now clean for the last year and is in a Suboxone treatment program. States while inpatient, she was restarted on oral aripiprazole but she prefers the injection. Mother states the injection was very helpful.   Mother states patient's mental health issues began shortly after the death of her father in 2011. Patient states she began using benzodiazepines at 14 year old and it increased from there. States she \"maybe\" began abusing more substances after her father's death. She reports using opioids, benzodiazepines and heroin. States she used methamphetamine \"rarely\" and was not her drug of choice.   She reports struggling with anxiety and depression. Mother states she is paranoid and does not like to stay alone. Patient states she made a comment one day that there was \"too many cars on the side of the road in less than a mile\". States she was labeled as paranoid due to this. She does report feeling as if she is being watched at times. She reports mirtazapine helps with sleep. States she was previously prescribed quetiapine but did not like it because she slept for \"days at a time\". She reports appetite is good. She denies SI/HI/AVH.      The following portions of the " "patient's history were reviewed and updated as appropriate: allergies, current medications, past family history, past medical history, past social history, past surgical history and problem list.      Past Medical History:  Past Medical History:   Diagnosis Date   • Anxiety    • Arthritis    • Bronchitis    • Chronic back pain    • Fibromyalgia    • Hypertension    • MDD (major depressive disorder), recurrent, severe, with psychosis (HCC) 06/09/2018   • Panic disorder    • Psychiatric illness    • Schizoaffective disorder (HCC)    • Schizophrenia, paranoid (HCC)        Social History:  Social History     Socioeconomic History   • Marital status:    • Number of children: 0   • Highest education level: High school graduate   Tobacco Use   • Smoking status: Current Every Day Smoker     Packs/day: 1.00     Years: 8.00     Pack years: 8.00     Types: Cigarettes   • Smokeless tobacco: Never Used   Vaping Use   • Vaping Use: Never used   Substance and Sexual Activity   • Alcohol use: No   • Drug use: No     Comment: denies   • Sexual activity: Not Currently     Partners: Male     Comment: reports she hasn't been in \"forever.\"        Family History:  Family History   Problem Relation Age of Onset   • Arthritis Mother    • Stroke Father    • COPD Brother    • Diabetes Maternal Grandmother    • COPD Maternal Grandfather    • Heart disease Maternal Grandfather    • Stroke Maternal Grandfather    • Anxiety disorder Neg Hx    • Depression Neg Hx    • Schizophrenia Neg Hx        Past Surgical History:  Past Surgical History:   Procedure Laterality Date   • WISDOM TOOTH EXTRACTION         Problem List:  Patient Active Problem List   Diagnosis   • Fibromyalgia   • Essential hypertension   • Schizoaffective disorder (HCC)   • Major depressive disorder, recurrent (HCC)   • Paranoid behavior (HCC)   • Psychosis ([facility])   • Opioid use disorder, severe, on maintenance therapy (HCC)       Allergy:   Allergies   Allergen Reactions " "  • Elavil [Amitriptyline] Nausea Only        Current Medications:   Current Outpatient Medications   Medication Sig Dispense Refill   • ARIPiprazole (ABILIFY) 10 MG tablet Take 1 tablet by mouth Daily. Indications: Schizophrenia 14 tablet 0   • hydrOXYzine pamoate (VISTARIL) 25 MG capsule Take 1 capsule by mouth Every 6 (Six) Hours As Needed for Itching. Indications: Feeling Anxious 90 capsule 1   • ARIPiprazole ER (ABILIFY MAINTENA) 400 MG prefilled syringe IM prefilled syringe Inject 400 mg into the appropriate muscle as directed by prescriber Every 28 (Twenty-Eight) Days. 1 each 3   • buprenorphine-naloxone (SUBOXONE) 8-2 MG per SL tablet Place 1 tablet under the tongue Daily.     • cyclobenzaprine (FLEXERIL) 10 MG tablet Take 1 tablet by mouth 3 (Three) Times a Day As Needed for Muscle Spasms for up to 10 days. 30 tablet 0   • mirtazapine (Remeron) 15 MG tablet Take 1 tablet by mouth Every Night. 30 tablet 1     No current facility-administered medications for this visit.       Review of Symptoms:    Review of Systems   Constitutional: Negative.    HENT: Negative.    Eyes: Negative.    Respiratory: Negative.    Cardiovascular: Negative.    Gastrointestinal: Negative.    Endocrine: Negative.    Genitourinary: Negative.    Musculoskeletal: Negative.    Skin: Negative.    Neurological: Positive for memory problem.   Psychiatric/Behavioral: Positive for decreased concentration and depressed mood. Negative for suicidal ideas. The patient is nervous/anxious.        Objective   Physical Exam:   Blood pressure 141/95, pulse 110, temperature 98 °F (36.7 °C), temperature source Temporal, height 167.6 cm (65.98\"), weight 72.8 kg (160 lb 9.6 oz), SpO2 100 %.  Body mass index is 25.93 kg/m².    Appearance: Well nourished female, appropriately dressed, appears stated age and in no acute distress  Gait, Station, Strength: WNL    Mental Status Exam:   Hygiene:   good  Cooperation:  Cooperative  Eye Contact:  Fair  Psychomotor " Behavior:  Restless  Affect:  Blunted  Mood: normal  Hopelessness: Denies  Speech:  Rambling  Thought Process:  Circum  Thought Content:  Mood congruent  Suicidal:  None  Homicidal:  None  Hallucinations:  None  Delusion:  Paranoid  Memory:  Deficits  Orientation:  Person, Place, Time and Situation  Reliability:  poor  Insight:  Poor  Judgement:  Impaired  Impulse Control:  Impaired  Physical/Medical Issues:  No      PHQ-Score Total:  PHQ-9 Total Score: 1           Lab Results:   Admission on 09/03/2022, Discharged on 09/03/2022   Component Date Value Ref Range Status   • Glucose 09/03/2022 74  65 - 99 mg/dL Final   • BUN 09/03/2022 8  6 - 20 mg/dL Final   • Creatinine 09/03/2022 0.83  0.57 - 1.00 mg/dL Final   • Sodium 09/03/2022 140  136 - 145 mmol/L Final   • Potassium 09/03/2022 3.4 (A) 3.5 - 5.2 mmol/L Final   • Chloride 09/03/2022 104  98 - 107 mmol/L Final   • CO2 09/03/2022 25.6  22.0 - 29.0 mmol/L Final   • Calcium 09/03/2022 9.2  8.6 - 10.5 mg/dL Final   • Total Protein 09/03/2022 6.6  6.0 - 8.5 g/dL Final   • Albumin 09/03/2022 3.72  3.50 - 5.20 g/dL Final   • ALT (SGPT) 09/03/2022 11  1 - 33 U/L Final   • AST (SGOT) 09/03/2022 15  1 - 32 U/L Final   • Alkaline Phosphatase 09/03/2022 81  39 - 117 U/L Final   • Total Bilirubin 09/03/2022 0.2  0.0 - 1.2 mg/dL Final   • Globulin 09/03/2022 2.9  gm/dL Final   • A/G Ratio 09/03/2022 1.3  g/dL Final   • BUN/Creatinine Ratio 09/03/2022 9.6  7.0 - 25.0 Final   • Anion Gap 09/03/2022 10.4  5.0 - 15.0 mmol/L Final   • eGFR 09/03/2022 92.1  >60.0 mL/min/1.73 Final    National Kidney Foundation and American Society of Nephrology (ASN) Task Force recommended calculation based on the Chronic Kidney Disease Epidemiology Collaboration (CKD-EPI) equation refit without adjustment for race.   • Troponin T 09/03/2022 <0.010  0.000 - 0.030 ng/mL Final   • C-Reactive Protein 09/03/2022 0.58 (A) 0.00 - 0.50 mg/dL Final   • Sed Rate 09/03/2022 28 (A) 0 - 20 mm/hr Final   •  Color, UA 09/03/2022 Yellow  Yellow, Straw Final   • Appearance, UA 09/03/2022 Clear  Clear Final   • pH, UA 09/03/2022 6.0  5.0 - 8.0 Final   • Specific Gravity, UA 09/03/2022 1.009  1.005 - 1.030 Final   • Glucose, UA 09/03/2022 Negative  Negative Final   • Ketones, UA 09/03/2022 Negative  Negative Final   • Bilirubin, UA 09/03/2022 Negative  Negative Final   • Blood, UA 09/03/2022 Negative  Negative Final   • Protein, UA 09/03/2022 Negative  Negative Final   • Leuk Esterase, UA 09/03/2022 Negative  Negative Final   • Nitrite, UA 09/03/2022 Negative  Negative Final   • Urobilinogen, UA 09/03/2022 0.2 E.U./dL  0.2 - 1.0 E.U./dL Final   • WBC 09/03/2022 6.83  3.40 - 10.80 10*3/mm3 Final   • RBC 09/03/2022 3.82  3.77 - 5.28 10*6/mm3 Final   • Hemoglobin 09/03/2022 11.2 (A) 12.0 - 15.9 g/dL Final   • Hematocrit 09/03/2022 33.9 (A) 34.0 - 46.6 % Final   • MCV 09/03/2022 88.7  79.0 - 97.0 fL Final   • MCH 09/03/2022 29.3  26.6 - 33.0 pg Final   • MCHC 09/03/2022 33.0  31.5 - 35.7 g/dL Final   • RDW 09/03/2022 12.7  12.3 - 15.4 % Final   • RDW-SD 09/03/2022 41.4  37.0 - 54.0 fl Final   • MPV 09/03/2022 8.9  6.0 - 12.0 fL Final   • Platelets 09/03/2022 290  140 - 450 10*3/mm3 Final   • Neutrophil % 09/03/2022 46.2  42.7 - 76.0 % Final   • Lymphocyte % 09/03/2022 42.5  19.6 - 45.3 % Final   • Monocyte % 09/03/2022 8.6  5.0 - 12.0 % Final   • Eosinophil % 09/03/2022 2.3  0.3 - 6.2 % Final   • Basophil % 09/03/2022 0.3  0.0 - 1.5 % Final   • Immature Grans % 09/03/2022 0.1  0.0 - 0.5 % Final   • Neutrophils, Absolute 09/03/2022 3.15  1.70 - 7.00 10*3/mm3 Final   • Lymphocytes, Absolute 09/03/2022 2.90  0.70 - 3.10 10*3/mm3 Final   • Monocytes, Absolute 09/03/2022 0.59  0.10 - 0.90 10*3/mm3 Final   • Eosinophils, Absolute 09/03/2022 0.16  0.00 - 0.40 10*3/mm3 Final   • Basophils, Absolute 09/03/2022 0.02  0.00 - 0.20 10*3/mm3 Final   • Immature Grans, Absolute 09/03/2022 0.01  0.00 - 0.05 10*3/mm3 Final   • nRBC 09/03/2022  0.0  0.0 - 0.2 /100 WBC Final   • Extra Tube 09/03/2022 Hold for add-ons.   Final    Auto resulted.   • Extra Tube 09/03/2022 hold for add-on   Final    Auto resulted   • Extra Tube 09/03/2022 Hold for add-ons.   Final    Auto resulted.   • Extra Tube 09/03/2022 Hold for add-ons.   Final    Auto resulted   • QT Interval 09/02/2022 388  ms Final   • QTC Interval 09/02/2022 492  ms Final       Assessment & Plan   Diagnoses and all orders for this visit:    1. Schizoaffective disorder, bipolar type (HCC) (Primary)    2. Generalized anxiety disorder  -     mirtazapine (Remeron) 15 MG tablet; Take 1 tablet by mouth Every Night.  Dispense: 30 tablet; Refill: 1    3. Insomnia due to mental condition  -     mirtazapine (Remeron) 15 MG tablet; Take 1 tablet by mouth Every Night.  Dispense: 30 tablet; Refill: 1    4. Medication management    5. History of substance abuse ([facility])    Other orders  -     ARIPiprazole (ABILIFY) 10 MG tablet; Take 1 tablet by mouth Daily. Indications: Schizophrenia  Dispense: 14 tablet; Refill: 0  -     hydrOXYzine pamoate (VISTARIL) 25 MG capsule; Take 1 capsule by mouth Every 6 (Six) Hours As Needed for Itching. Indications: Feeling Anxious  Dispense: 90 capsule; Refill: 1  -     ARIPiprazole ER (ABILIFY MAINTENA) 400 MG prefilled syringe IM prefilled syringe; Inject 400 mg into the appropriate muscle as directed by prescriber Every 28 (Twenty-Eight) Days.  Dispense: 1 each; Refill: 3        -Restart aripiprazole  mg injection every 28 days for schizoaffective disorder  -Continue aripiprazole 10 mg daily for 14 days to maximize benefit of injection  -Continue mirtazapine 15 mg nightly for sleep  -Continue hydroxyzine 25 mg every 6 hours as needed for anxiety  -Encouraged patient to begin therapy  -Encouraged patient to attend NA meetings  -Unable to obtain UDS, will obtain at next visit  -LUIS reviewed and appropriate. Patient counseled on use of controlled substances.   -The benefits of a  healthy diet and exercise were discussed with patient, especially the positive effects they have on mental health. Patient encouraged to consider lifestyle modification regarding  diet and exercise patterns to maximize results of mental health treatment.  -Reviewed previous available documentation  -Reviewed most recent available labs   -Cintia Issa  reports that she has been smoking cigarettes. She has a 8.00 pack-year smoking history. She has never used smokeless tobacco.. I have educated her on the risk of diseases from using tobacco products such as cancer, COPD, heart disease, reproductive problems, low birth weight and cataracts. I advised her to quit and she is not willing to quit. I spent 3  minutes counseling the patient.               Visit Diagnoses:    ICD-10-CM ICD-9-CM   1. Schizoaffective disorder, bipolar type (HCC)  F25.0 295.70   2. Generalized anxiety disorder  F41.1 300.02   3. Insomnia due to mental condition  F51.05 300.9     327.02   4. Medication management  Z79.899 V58.69   5. History of substance abuse (HCC)  F19.11 305.93         TREATMENT PLAN/GOALS: Continue supportive psychotherapy efforts and medications as indicated. Treatment and medication options discussed during today's visit. Patient acknowledged and verbally consented to continue with current treatment plan and was educated on the importance of compliance with treatment and follow-up appointments.    MEDICATION ISSUES:    Discussed medication options and treatment plan of prescribed medication as well as the risks, benefits, and side effects including potential falls, possible impaired driving and metabolic adversities among others. Patient is agreeable to call the office with any worsening of symptoms or onset of side effects. Patient is agreeable to call 911 or go to the nearest ER should he/she begin having SI/HI.     MEDS ORDERED DURING VISIT:  New Medications Ordered This Visit   Medications   • ARIPiprazole  (ABILIFY) 10 MG tablet     Sig: Take 1 tablet by mouth Daily. Indications: Schizophrenia     Dispense:  14 tablet     Refill:  0   • hydrOXYzine pamoate (VISTARIL) 25 MG capsule     Sig: Take 1 capsule by mouth Every 6 (Six) Hours As Needed for Itching. Indications: Feeling Anxious     Dispense:  90 capsule     Refill:  1   • ARIPiprazole ER (ABILIFY MAINTENA) 400 MG prefilled syringe IM prefilled syringe     Sig: Inject 400 mg into the appropriate muscle as directed by prescriber Every 28 (Twenty-Eight) Days.     Dispense:  1 each     Refill:  3   • mirtazapine (Remeron) 15 MG tablet     Sig: Take 1 tablet by mouth Every Night.     Dispense:  30 tablet     Refill:  1       Return in about 4 weeks (around 10/5/2022), or if symptoms worsen or fail to improve.         Prognosis: Guarded dependent on medication/follow up and treatment plan compliance.  Functionality: pt showing improvements in important areas of daily functioning.     Short-term goals: Patient will adhere to medication regimen and note continued improvement in symptoms over the next 3 months.   Long-term goals: Patient will be adherent to medication management and psychotherapy with continued improvement in symptoms over the next 6 months          This document has been electronically signed by ROBIN Logan   September 7, 2022 10:44 EDT    Part of this note may be an electronic transcription/translation of spoken language to printed text using the Dragon Dictation System.

## 2022-09-08 ENCOUNTER — PRIOR AUTHORIZATION (OUTPATIENT)
Dept: PSYCHIATRY | Facility: CLINIC | Age: 37
End: 2022-09-08

## 2022-09-08 NOTE — TELEPHONE ENCOUNTER
Rx #: [id number]  Abilify Maintena 400MG syringes    Request Reference Number: [id number]. ABILIFY MAIN INJ 400MG is approved through 09/07/2023. Your patient may now fill this prescription and it will be covered.

## 2022-09-13 ENCOUNTER — CLINICAL SUPPORT (OUTPATIENT)
Dept: FAMILY MEDICINE CLINIC | Facility: CLINIC | Age: 37
End: 2022-09-13

## 2022-09-13 DIAGNOSIS — F11.20 OPIOID USE DISORDER, SEVERE, ON MAINTENANCE THERAPY: Primary | ICD-10-CM

## 2022-09-13 LAB
AMPHET+METHAMPHET UR QL: NEGATIVE
AMPHETAMINES UR QL: NEGATIVE
BARBITURATES UR QL SCN: NEGATIVE
BENZODIAZ UR QL SCN: NEGATIVE
BUPRENORPHINE SERPL-MCNC: POSITIVE NG/ML
CANNABINOIDS SERPL QL: NEGATIVE
COCAINE UR QL: NEGATIVE
METHADONE UR QL SCN: NEGATIVE
OPIATES UR QL: NEGATIVE
OXYCODONE UR QL SCN: NEGATIVE
PCP UR QL SCN: NEGATIVE
PROPOXYPH UR QL: NEGATIVE
TRICYCLICS UR QL SCN: NEGATIVE

## 2022-09-13 PROCEDURE — 80306 DRUG TEST PRSMV INSTRMNT: CPT | Performed by: NURSE PRACTITIONER

## 2022-09-13 PROCEDURE — 96372 THER/PROPH/DIAG INJ SC/IM: CPT | Performed by: INTERNAL MEDICINE

## 2022-09-13 NOTE — PROGRESS NOTES
Injection  Injection performed in Right dorsoglueteal  by Mariah Kay MA. Patient tolerated the procedure well without complications.  09/13/22   Mariah Kay MA

## 2022-10-01 ENCOUNTER — HOSPITAL ENCOUNTER (EMERGENCY)
Facility: HOSPITAL | Age: 37
Discharge: PSYCHIATRIC OR SUBSTANCE USE FACILITY | End: 2022-10-01
Attending: EMERGENCY MEDICINE | Admitting: EMERGENCY MEDICINE

## 2022-10-01 ENCOUNTER — HOSPITAL ENCOUNTER (INPATIENT)
Facility: HOSPITAL | Age: 37
LOS: 4 days | Discharge: HOME | End: 2022-10-05
Attending: PSYCHIATRY & NEUROLOGY | Admitting: PSYCHIATRY & NEUROLOGY

## 2022-10-01 VITALS
TEMPERATURE: 98 F | BODY MASS INDEX: 25.71 KG/M2 | HEIGHT: 66 IN | HEART RATE: 77 BPM | OXYGEN SATURATION: 97 % | WEIGHT: 160 LBS | DIASTOLIC BLOOD PRESSURE: 69 MMHG | SYSTOLIC BLOOD PRESSURE: 112 MMHG | RESPIRATION RATE: 17 BRPM

## 2022-10-01 DIAGNOSIS — F29 PSYCHOSIS, UNSPECIFIED PSYCHOSIS TYPE: Primary | ICD-10-CM

## 2022-10-01 LAB
ALBUMIN SERPL-MCNC: 4.18 G/DL (ref 3.5–5.2)
ALBUMIN/GLOB SERPL: 1.2 G/DL
ALP SERPL-CCNC: 135 U/L (ref 39–117)
ALT SERPL W P-5'-P-CCNC: 11 U/L (ref 1–33)
AMPHET+METHAMPHET UR QL: POSITIVE
AMPHETAMINES UR QL: POSITIVE
ANION GAP SERPL CALCULATED.3IONS-SCNC: 10.4 MMOL/L (ref 5–15)
AST SERPL-CCNC: 14 U/L (ref 1–32)
B-HCG UR QL: NEGATIVE
BACTERIA UR QL AUTO: ABNORMAL /HPF
BARBITURATES UR QL SCN: NEGATIVE
BASOPHILS # BLD AUTO: 0.02 10*3/MM3 (ref 0–0.2)
BASOPHILS NFR BLD AUTO: 0.3 % (ref 0–1.5)
BENZODIAZ UR QL SCN: NEGATIVE
BILIRUB SERPL-MCNC: 0.3 MG/DL (ref 0–1.2)
BILIRUB UR QL STRIP: NEGATIVE
BUN SERPL-MCNC: 4 MG/DL (ref 6–20)
BUN/CREAT SERPL: 5.1 (ref 7–25)
BUPRENORPHINE SERPL-MCNC: POSITIVE NG/ML
CALCIUM SPEC-SCNC: 9.9 MG/DL (ref 8.6–10.5)
CANNABINOIDS SERPL QL: NEGATIVE
CHLORIDE SERPL-SCNC: 103 MMOL/L (ref 98–107)
CLARITY UR: CLEAR
CO2 SERPL-SCNC: 28.6 MMOL/L (ref 22–29)
COCAINE UR QL: NEGATIVE
COLOR UR: YELLOW
CREAT SERPL-MCNC: 0.78 MG/DL (ref 0.57–1)
DEPRECATED RDW RBC AUTO: 40.6 FL (ref 37–54)
EGFRCR SERPLBLD CKD-EPI 2021: 99.2 ML/MIN/1.73
EOSINOPHIL # BLD AUTO: 0.24 10*3/MM3 (ref 0–0.4)
EOSINOPHIL NFR BLD AUTO: 3.2 % (ref 0.3–6.2)
ERYTHROCYTE [DISTWIDTH] IN BLOOD BY AUTOMATED COUNT: 12.5 % (ref 12.3–15.4)
ETHANOL BLD-MCNC: <10 MG/DL (ref 0–10)
ETHANOL UR QL: <0.01 %
FLUAV SUBTYP SPEC NAA+PROBE: NOT DETECTED
FLUBV RNA ISLT QL NAA+PROBE: NOT DETECTED
GLOBULIN UR ELPH-MCNC: 3.4 GM/DL
GLUCOSE SERPL-MCNC: 89 MG/DL (ref 65–99)
GLUCOSE UR STRIP-MCNC: NEGATIVE MG/DL
HCT VFR BLD AUTO: 38.5 % (ref 34–46.6)
HGB BLD-MCNC: 12.6 G/DL (ref 12–15.9)
HGB UR QL STRIP.AUTO: NEGATIVE
HOLD SPECIMEN: NORMAL
HOLD SPECIMEN: NORMAL
HYALINE CASTS UR QL AUTO: ABNORMAL /LPF
IMM GRANULOCYTES # BLD AUTO: 0.02 10*3/MM3 (ref 0–0.05)
IMM GRANULOCYTES NFR BLD AUTO: 0.3 % (ref 0–0.5)
KETONES UR QL STRIP: NEGATIVE
LEUKOCYTE ESTERASE UR QL STRIP.AUTO: ABNORMAL
LYMPHOCYTES # BLD AUTO: 2.34 10*3/MM3 (ref 0.7–3.1)
LYMPHOCYTES NFR BLD AUTO: 31.4 % (ref 19.6–45.3)
MAGNESIUM SERPL-MCNC: 2.1 MG/DL (ref 1.6–2.6)
MCH RBC QN AUTO: 28.8 PG (ref 26.6–33)
MCHC RBC AUTO-ENTMCNC: 32.7 G/DL (ref 31.5–35.7)
MCV RBC AUTO: 88.1 FL (ref 79–97)
METHADONE UR QL SCN: NEGATIVE
MONOCYTES # BLD AUTO: 0.49 10*3/MM3 (ref 0.1–0.9)
MONOCYTES NFR BLD AUTO: 6.6 % (ref 5–12)
NEUTROPHILS NFR BLD AUTO: 4.35 10*3/MM3 (ref 1.7–7)
NEUTROPHILS NFR BLD AUTO: 58.2 % (ref 42.7–76)
NITRITE UR QL STRIP: NEGATIVE
NRBC BLD AUTO-RTO: 0 /100 WBC (ref 0–0.2)
OPIATES UR QL: NEGATIVE
OXYCODONE UR QL SCN: NEGATIVE
PCP UR QL SCN: NEGATIVE
PH UR STRIP.AUTO: 6.5 [PH] (ref 5–8)
PLATELET # BLD AUTO: 297 10*3/MM3 (ref 140–450)
PMV BLD AUTO: 9 FL (ref 6–12)
POTASSIUM SERPL-SCNC: 3.2 MMOL/L (ref 3.5–5.2)
PROPOXYPH UR QL: NEGATIVE
PROT SERPL-MCNC: 7.6 G/DL (ref 6–8.5)
PROT UR QL STRIP: NEGATIVE
RBC # BLD AUTO: 4.37 10*6/MM3 (ref 3.77–5.28)
RBC # UR STRIP: ABNORMAL /HPF
REF LAB TEST METHOD: ABNORMAL
SARS-COV-2 RNA PNL SPEC NAA+PROBE: NOT DETECTED
SODIUM SERPL-SCNC: 142 MMOL/L (ref 136–145)
SP GR UR STRIP: <=1.005 (ref 1–1.03)
SQUAMOUS #/AREA URNS HPF: ABNORMAL /HPF
TRICYCLICS UR QL SCN: NEGATIVE
UROBILINOGEN UR QL STRIP: ABNORMAL
WBC # UR STRIP: ABNORMAL /HPF
WBC NRBC COR # BLD: 7.46 10*3/MM3 (ref 3.4–10.8)
WHOLE BLOOD HOLD COAG: NORMAL
WHOLE BLOOD HOLD SPECIMEN: NORMAL

## 2022-10-01 PROCEDURE — 80053 COMPREHEN METABOLIC PANEL: CPT | Performed by: PHYSICIAN ASSISTANT

## 2022-10-01 PROCEDURE — 81001 URINALYSIS AUTO W/SCOPE: CPT | Performed by: PHYSICIAN ASSISTANT

## 2022-10-01 PROCEDURE — C9803 HOPD COVID-19 SPEC COLLECT: HCPCS

## 2022-10-01 PROCEDURE — 80306 DRUG TEST PRSMV INSTRMNT: CPT | Performed by: PHYSICIAN ASSISTANT

## 2022-10-01 PROCEDURE — 87636 SARSCOV2 & INF A&B AMP PRB: CPT | Performed by: PHYSICIAN ASSISTANT

## 2022-10-01 PROCEDURE — 83735 ASSAY OF MAGNESIUM: CPT | Performed by: PHYSICIAN ASSISTANT

## 2022-10-01 PROCEDURE — 99285 EMERGENCY DEPT VISIT HI MDM: CPT

## 2022-10-01 PROCEDURE — 36415 COLL VENOUS BLD VENIPUNCTURE: CPT

## 2022-10-01 PROCEDURE — 81025 URINE PREGNANCY TEST: CPT | Performed by: PHYSICIAN ASSISTANT

## 2022-10-01 PROCEDURE — 93005 ELECTROCARDIOGRAM TRACING: CPT | Performed by: PSYCHIATRY & NEUROLOGY

## 2022-10-01 PROCEDURE — 93010 ELECTROCARDIOGRAM REPORT: CPT | Performed by: INTERNAL MEDICINE

## 2022-10-01 PROCEDURE — 82077 ASSAY SPEC XCP UR&BREATH IA: CPT | Performed by: PHYSICIAN ASSISTANT

## 2022-10-01 PROCEDURE — 85025 COMPLETE CBC W/AUTO DIFF WBC: CPT | Performed by: PHYSICIAN ASSISTANT

## 2022-10-01 RX ORDER — IBUPROFEN 400 MG/1
400 TABLET ORAL EVERY 6 HOURS PRN
Status: DISCONTINUED | OUTPATIENT
Start: 2022-10-01 | End: 2022-10-05 | Stop reason: HOSPADM

## 2022-10-01 RX ORDER — LOPERAMIDE HYDROCHLORIDE 2 MG/1
2 CAPSULE ORAL
Status: DISCONTINUED | OUTPATIENT
Start: 2022-10-01 | End: 2022-10-05 | Stop reason: HOSPADM

## 2022-10-01 RX ORDER — BENZONATATE 100 MG/1
100 CAPSULE ORAL 3 TIMES DAILY PRN
Status: DISCONTINUED | OUTPATIENT
Start: 2022-10-01 | End: 2022-10-05 | Stop reason: HOSPADM

## 2022-10-01 RX ORDER — BENZTROPINE MESYLATE 1 MG/1
2 TABLET ORAL ONCE AS NEEDED
Status: DISCONTINUED | OUTPATIENT
Start: 2022-10-01 | End: 2022-10-05 | Stop reason: HOSPADM

## 2022-10-01 RX ORDER — BENZTROPINE MESYLATE 1 MG/ML
1 INJECTION INTRAMUSCULAR; INTRAVENOUS ONCE AS NEEDED
Status: DISCONTINUED | OUTPATIENT
Start: 2022-10-01 | End: 2022-10-05 | Stop reason: HOSPADM

## 2022-10-01 RX ORDER — ECHINACEA PURPUREA EXTRACT 125 MG
2 TABLET ORAL AS NEEDED
Status: DISCONTINUED | OUTPATIENT
Start: 2022-10-01 | End: 2022-10-05 | Stop reason: HOSPADM

## 2022-10-01 RX ORDER — ACETAMINOPHEN 325 MG/1
650 TABLET ORAL EVERY 6 HOURS PRN
Status: DISCONTINUED | OUTPATIENT
Start: 2022-10-01 | End: 2022-10-05 | Stop reason: HOSPADM

## 2022-10-01 RX ORDER — TRAZODONE HYDROCHLORIDE 50 MG/1
50 TABLET ORAL NIGHTLY PRN
Status: DISCONTINUED | OUTPATIENT
Start: 2022-10-01 | End: 2022-10-05 | Stop reason: HOSPADM

## 2022-10-01 RX ORDER — FAMOTIDINE 20 MG/1
20 TABLET, FILM COATED ORAL 2 TIMES DAILY PRN
Status: DISCONTINUED | OUTPATIENT
Start: 2022-10-01 | End: 2022-10-05 | Stop reason: HOSPADM

## 2022-10-01 RX ORDER — BUPRENORPHINE HYDROCHLORIDE AND NALOXONE HYDROCHLORIDE DIHYDRATE 8; 2 MG/1; MG/1
1 TABLET SUBLINGUAL DAILY
Status: CANCELLED | OUTPATIENT
Start: 2022-10-01

## 2022-10-01 RX ORDER — NICOTINE 21 MG/24HR
1 PATCH, TRANSDERMAL 24 HOURS TRANSDERMAL
Status: DISCONTINUED | OUTPATIENT
Start: 2022-10-01 | End: 2022-10-05 | Stop reason: HOSPADM

## 2022-10-01 RX ORDER — HYDROXYZINE 50 MG/1
50 TABLET, FILM COATED ORAL EVERY 6 HOURS PRN
Status: DISCONTINUED | OUTPATIENT
Start: 2022-10-01 | End: 2022-10-05 | Stop reason: HOSPADM

## 2022-10-01 RX ORDER — POTASSIUM CHLORIDE 20 MEQ/1
40 TABLET, EXTENDED RELEASE ORAL ONCE
Status: COMPLETED | OUTPATIENT
Start: 2022-10-01 | End: 2022-10-01

## 2022-10-01 RX ORDER — HYDROXYZINE HYDROCHLORIDE 25 MG/1
25 TABLET, FILM COATED ORAL EVERY 6 HOURS PRN
Refills: 1 | Status: CANCELLED | OUTPATIENT
Start: 2022-10-01

## 2022-10-01 RX ORDER — MIRTAZAPINE 15 MG/1
15 TABLET, FILM COATED ORAL NIGHTLY
Status: DISCONTINUED | OUTPATIENT
Start: 2022-10-01 | End: 2022-10-05 | Stop reason: HOSPADM

## 2022-10-01 RX ORDER — ONDANSETRON 4 MG/1
4 TABLET, FILM COATED ORAL EVERY 6 HOURS PRN
Status: DISCONTINUED | OUTPATIENT
Start: 2022-10-01 | End: 2022-10-05 | Stop reason: HOSPADM

## 2022-10-01 RX ORDER — ALUMINA, MAGNESIA, AND SIMETHICONE 2400; 2400; 240 MG/30ML; MG/30ML; MG/30ML
15 SUSPENSION ORAL EVERY 6 HOURS PRN
Status: DISCONTINUED | OUTPATIENT
Start: 2022-10-01 | End: 2022-10-05 | Stop reason: HOSPADM

## 2022-10-01 RX ADMIN — POTASSIUM CHLORIDE 40 MEQ: 20 TABLET, EXTENDED RELEASE ORAL at 19:25

## 2022-10-01 NOTE — NURSING NOTE
Full intake assessment completed awaiting Lab results.Patient presented to ED with complaints of Hallucinations. Patient reported she is diagnosed with paranoid schizophrenia and is currently not taking her modifications. She stated she is now seeing black shadows and hearing people talking in her head all the time. Patient rated anxiety and depression 5/10. Patient denies SI,HI,alcohol, and drugs. Patient denies abnormalities with sleep and appetite. Patient A/O X 3. Patient has history of admissions and treatment.

## 2022-10-01 NOTE — NURSING NOTE
Called and provided intake information including all abnormal  labs to Dr Avalos admit orders received.RBVOx2. Patient and ED provider aware.

## 2022-10-01 NOTE — NURSING NOTE
Patient arrived in intake.Patient arrived in intake.Patient provided a mask and encouraged to social distance   35

## 2022-10-01 NOTE — ED PROVIDER NOTES
Subjective   History of Present Illness  37-year-old female who presents to the ED today for mental health evaluation.  She states she has a history of anxiety and depression.  She states she stopped taking her medications about a month ago.  She states she is having hallucinations and reports that she is seeing shadows.  She states she feels very worn out because she is not sleeping at night.  She states she was recently started back on Abilify and Remeron but her symptoms are not improving.  She denies any suicidal or homicidal ideations.  She denies any drug or alcohol use.    History provided by:  Patient  Mental Health Problem  Presenting symptoms: hallucinations    Presenting symptoms: no suicidal thoughts    Degree of incapacity (severity):  Moderate  Onset quality:  Gradual  Duration:  1 month  Timing:  Constant  Progression:  Worsening  Chronicity:  New  Context: not alcohol use and not drug abuse    Relieved by:  Nothing  Worsened by:  Nothing  Associated symptoms: anxiety and insomnia    Risk factors: hx of mental illness        Review of Systems   Constitutional: Negative.    HENT: Negative.    Eyes: Negative.    Respiratory: Negative.    Cardiovascular: Negative.    Gastrointestinal: Negative.    Genitourinary: Negative.    Musculoskeletal: Negative.    Skin: Negative.    Neurological: Negative.    Psychiatric/Behavioral: Positive for hallucinations and sleep disturbance. Negative for suicidal ideas. The patient is nervous/anxious and has insomnia.    All other systems reviewed and are negative.      Past Medical History:   Diagnosis Date   • Anxiety    • Arthritis    • Bronchitis    • Chronic back pain    • Fibromyalgia    • Hypertension    • MDD (major depressive disorder), recurrent, severe, with psychosis (HCC) 06/09/2018   • Panic disorder    • Psychiatric illness    • Schizoaffective disorder (HCC)    • Schizophrenia, paranoid (HCC)        Allergies   Allergen Reactions   • Elavil [Amitriptyline]  "Nausea Only       Past Surgical History:   Procedure Laterality Date   • WISDOM TOOTH EXTRACTION         Family History   Problem Relation Age of Onset   • Arthritis Mother    • Stroke Father    • COPD Brother    • Diabetes Maternal Grandmother    • COPD Maternal Grandfather    • Heart disease Maternal Grandfather    • Stroke Maternal Grandfather    • Anxiety disorder Neg Hx    • Depression Neg Hx    • Schizophrenia Neg Hx        Social History     Socioeconomic History   • Marital status:    • Number of children: 0   • Highest education level: High school graduate   Tobacco Use   • Smoking status: Current Every Day Smoker     Packs/day: 1.00     Years: 8.00     Pack years: 8.00     Types: Cigarettes   • Smokeless tobacco: Never Used   Vaping Use   • Vaping Use: Never used   Substance and Sexual Activity   • Alcohol use: No   • Drug use: No     Comment: denies   • Sexual activity: Yes     Partners: Male     Comment: reports she hasn't been in \"forever.\"            Objective   Physical Exam  Vitals and nursing note reviewed.   Constitutional:       General: She is not in acute distress.     Appearance: Normal appearance.   HENT:      Head: Normocephalic and atraumatic.      Right Ear: External ear normal.      Left Ear: External ear normal.   Eyes:      Conjunctiva/sclera: Conjunctivae normal.      Pupils: Pupils are equal, round, and reactive to light.   Cardiovascular:      Rate and Rhythm: Normal rate and regular rhythm.      Pulses: Normal pulses.      Heart sounds: Normal heart sounds.   Pulmonary:      Effort: Pulmonary effort is normal.      Breath sounds: Normal breath sounds.   Abdominal:      General: Bowel sounds are normal.      Palpations: Abdomen is soft.   Musculoskeletal:         General: Normal range of motion.      Cervical back: Normal range of motion and neck supple.   Skin:     General: Skin is warm and dry.      Capillary Refill: Capillary refill takes less than 2 seconds. "   Neurological:      General: No focal deficit present.      Mental Status: She is alert and oriented to person, place, and time.   Psychiatric:         Mood and Affect: Mood is anxious.         Speech: Speech normal.         Behavior: Behavior normal. Behavior is cooperative.         Thought Content: Thought content does not include homicidal or suicidal ideation.         Procedures           ED Course  ED Course as of 10/01/22 1919   Sat Oct 01, 2022   1522 Medically clear for psych [AH]      ED Course User Index  [AH] Elizabeth Ruano PA                                           MDM  Number of Diagnoses or Management Options     Amount and/or Complexity of Data Reviewed  Clinical lab tests: reviewed    Patient Progress  Patient progress: stable      Final diagnoses:   Psychosis, unspecified psychosis type (HCC)       ED Disposition  ED Disposition     ED Disposition   DC/Transfer to Behavioral Health    Condition   Stable    Comment   --             No follow-up provider specified.       Medication List      No changes were made to your prescriptions during this visit.          Elizabeth Ruano PA  10/01/22 1919

## 2022-10-02 LAB
QT INTERVAL: 422 MS
QTC INTERVAL: 468 MS

## 2022-10-02 PROCEDURE — 99223 1ST HOSP IP/OBS HIGH 75: CPT | Performed by: PSYCHIATRY & NEUROLOGY

## 2022-10-02 RX ORDER — BUPRENORPHINE HYDROCHLORIDE AND NALOXONE HYDROCHLORIDE DIHYDRATE 8; 2 MG/1; MG/1
1 TABLET SUBLINGUAL DAILY
Status: DISCONTINUED | OUTPATIENT
Start: 2022-10-02 | End: 2022-10-05 | Stop reason: HOSPADM

## 2022-10-02 RX ADMIN — MIRTAZAPINE 15 MG: 15 TABLET, FILM COATED ORAL at 21:02

## 2022-10-02 RX ADMIN — IBUPROFEN 400 MG: 400 TABLET, FILM COATED ORAL at 08:06

## 2022-10-02 RX ADMIN — NICOTINE TRANSDERMAL SYSTEM 1 PATCH: 21 PATCH, EXTENDED RELEASE TRANSDERMAL at 08:05

## 2022-10-02 RX ADMIN — BUPRENORPHINE HYDROCHLORIDE AND NALOXONE HYDROCHLORIDE DIHYDRATE 1 TABLET: 8; 2 TABLET SUBLINGUAL at 14:13

## 2022-10-02 RX ADMIN — IBUPROFEN 400 MG: 400 TABLET, FILM COATED ORAL at 14:19

## 2022-10-02 NOTE — PLAN OF CARE
Problem: Adult Behavioral Health Plan of Care  Goal: Plan of Care Review  Flowsheets  Taken 10/1/2022 2141  Progress: no change  Plan of Care Reviewed With: patient  Patient Agreement with Plan of Care: agrees  Outcome Evaluation: New admit  Taken 10/1/2022 2115  Plan of Care Reviewed With: patient  Patient Agreement with Plan of Care: agrees   Goal Outcome Evaluation:  Plan of Care Reviewed With: patient  Patient Agreement with Plan of Care: agrees     Progress: no change  Outcome Evaluation: New admit

## 2022-10-02 NOTE — PLAN OF CARE
"Goal Outcome Evaluation:  Plan of Care Reviewed With: patient  Patient Agreement with Plan of Care: agrees     Progress: no change  Outcome Evaluation: Patient calm and cooperative. Rates anxiety a 3 and depression a 4. Reports AVH, stating, \"I see shadows.\" Denies SI/HI. Patient did not attend group. Patient stayed in her room most of the day.  "

## 2022-10-02 NOTE — NURSING NOTE
Pt states that she has been seeing shadows; not taking prescribed medications as directed; A 4 D5; meth use one time 1-2 days ago; sleeping and eating ok; multiple scabbed areas noted on bilateral upper extremities, pt states she was seen at [facility] r/t scabbed [facility] last week and was prescribed hydrocortisone

## 2022-10-02 NOTE — H&P
INITIAL PSYCHIATRIC HISTORY & PHYSICAL    Patient Identification:  Name:  Cintia Issa  Age:  37 y.o.  Sex:  female  :  1985  MRN:  [id number]   Visit Number:  [id number]  Primary Care Physician:  Neal Garcia MD    SUBJECTIVE    CC/Focus of Exam: hallucinations    HPI: Cintia Issa is a 37 y.o. female who was admitted on 10- and evaluated on 10- with complaints of hallucinations. Patient reported she is diagnosed with paranoid schizophrenia..  Patient  states she is now seeing black shadows and hearing people talking in her head all the time.   Patient denies any substance abuse but then states that she did some meth a couple of days ago.  Patient  states that she is prescribed suboxone. Patient denies any alcohol abuse. Patient states that she uses tobacco. Patient states the death of her friend as a stressor in her life. Patient denies any history of physical, mental or sexual abuse. Patient rates her appetite as good. Patient rates her sleep as good. Patient denies any nightmares. Patient rates her anxiety on a scale of 1/10 with 10 being the most severe a 5. Patient rates her depression on a scale of 1/10 with 10 being the most severe a 5. Patient denies any suicidal ideation. Patient denies any homicidal ideation. Patient states that she has hallucinations.  Patient was admitted to [facility] psychiatry for further safety and stabilization.    PAST PSYCHIATRIC HX:  Patient has had 6 prior admissions with the most recent one on 2022 - 2022. Patient states that she receives outpatient care with [facility].     She reports that she had an Abilify Maintena 400mg shot on .      SUBSTANCE USE HX: UDS was positive for Methamphetamine, Amphetamine, Buprenorphine. See HPI for current use.     SOCIAL HX: Patient states that she was born in [place], [place]. Patient states that she was raised in [place], [place]. Patient states that she  currently resides by herself in [place], [place]. Patient states that she is single and has no children. Patient states that she draws disability. Patient states that she has a highschool diploma. Patient denies any legal issues    Past Medical History:   Diagnosis Date   • Anxiety    • Arthritis    • Bronchitis    • Chronic back pain    • Fibromyalgia    • Hypertension    • MDD (major depressive disorder), recurrent, severe, with psychosis (HCC) 06/09/2018   • Panic disorder    • Psychiatric illness    • Schizoaffective disorder (HCC)    • Schizophrenia, paranoid ([facility])           Past Surgical History:   Procedure Laterality Date   • WISDOM TOOTH EXTRACTION         Family History   Problem Relation Age of Onset   • Arthritis Mother    • Stroke Father    • COPD Brother    • Diabetes Maternal Grandmother    • COPD Maternal Grandfather    • Heart disease Maternal Grandfather    • Stroke Maternal Grandfather    • Anxiety disorder Neg Hx    • Depression Neg Hx    • Schizophrenia Neg Hx          Facility-Administered Medications Prior to Admission   Medication Dose Route Frequency Provider Last Rate Last Admin   • ARIPiprazole ER (ABILIFY MAINTENA) IM prefilled syringe 400 mg  400 mg Intramuscular Q28 Days Francia Grace APRN         Medications Prior to Admission   Medication Sig Dispense Refill Last Dose   • ARIPiprazole ER (ABILIFY MAINTENA) 400 MG prefilled syringe IM prefilled syringe Inject 400 mg into the appropriate muscle as directed by prescriber Every 28 (Twenty-Eight) Days. 1 each 3 Past Month at Unknown time   • buprenorphine-naloxone (SUBOXONE) 8-2 MG per SL tablet Place 1 tablet under the tongue Daily.   10/1/2022 at Unknown time   • mirtazapine (Remeron) 15 MG tablet Take 1 tablet by mouth Every Night. 30 tablet 1 9/30/2022 at Unknown time   • hydrOXYzine pamoate (VISTARIL) 25 MG capsule Take 1 capsule by mouth Every 6 (Six) Hours As Needed for Itching. Indications: Feeling Anxious 90 capsule 1  Unknown at Unknown time         ALLERGIES:  Elavil [amitriptyline]    Temp:  [97.1 °F (36.2 °C)-98 °F (36.7 °C)] 97.2 °F (36.2 °C)  Heart Rate:  [] 82  Resp:  [16-18] 18  BP: (102-128)/(62-81) 102/62    REVIEW OF SYSTEMS:  Review of Systems   Constitutional: Positive for activity change and appetite change.   HENT: Negative.    Eyes: Negative.    Respiratory: Negative.    Cardiovascular: Negative.    Gastrointestinal: Negative.    Endocrine: Negative.    Genitourinary: Negative.    Musculoskeletal: Negative.    Skin: Negative.    Allergic/Immunologic: Negative.    Neurological: Negative.    Hematological: Negative.         OBJECTIVE    PHYSICAL EXAM:  Physical Exam  Constitutional:       Appearance: She is well-developed.   HENT:      Head: Normocephalic and atraumatic.      Nose: Nose normal.   Eyes:      General: No scleral icterus.        Right eye: No discharge.         Left eye: No discharge.      Conjunctiva/sclera: Conjunctivae normal.      Pupils: Pupils are equal, round, and reactive to light.   Neck:      Thyroid: No thyromegaly.      Vascular: No JVD.   Cardiovascular:      Rate and Rhythm: Normal rate and regular rhythm.      Heart sounds: Normal heart sounds. No murmur heard.    No friction rub. No gallop.   Pulmonary:      Effort: Pulmonary effort is normal. No respiratory distress.      Breath sounds: Normal breath sounds. No wheezing.   Abdominal:      General: Bowel sounds are normal. There is no distension.      Palpations: Abdomen is soft. There is no mass.      Tenderness: There is no guarding or rebound.   Musculoskeletal:         General: No tenderness or deformity. Normal range of motion.      Cervical back: Normal range of motion and neck supple.   Lymphadenopathy:      Cervical: No cervical adenopathy.   Skin:     General: Skin is warm and dry.      Coloration: Skin is not pale.      Findings: No erythema or rash.      Comments: Healing rash on B forearms   Neurological:      Mental  Status: She is alert and oriented to person, place, and time.      Cranial Nerves: No cranial nerve deficit.      Motor: No abnormal muscle tone.      Coordination: Coordination normal.         MENTAL STATUS EXAM:    Hygiene:   fair  Cooperation:  Cooperative  Eye Contact:  Poor  Psychomotor Behavior:  Appropriate  Affect:  Appropriate  Hopelessness: 5  Speech:  Minimal  Linear  Thought Content:  Normal  Suicidal:  None  Homicidal:  None  Hallucinations:  Auditory, Visual and Tactile  Delusion:  Paranoid  Memory:  Intact  Orientation:  Person, Place, Time and Situation  Reliability:  fair  Insight:  Fair  Judgement:  Poor  Impulse Control:  Poor      Imaging Results (Last 24 Hours)     ** No results found for the last 24 hours. **           ECG/EMG Results (most recent)     Procedure Component Value Units Date/Time    ECG 12 Lead [[id number]] Collected: 10/01/22 2112     Updated: 10/01/22 2114     QT Interval 422 ms      QTC Interval 468 ms     Narrative:      Test Reason : Baseline Cardiac Status  Blood Pressure :   */*   mmHG  Vent. Rate :  74 BPM     Atrial Rate :  74 BPM     P-R Int : 126 ms          QRS Dur :  84 ms      QT Int : 422 ms       P-R-T Axes :  63  68  74 degrees     QTc Int : 468 ms    Normal sinus rhythm with sinus arrhythmia  Normal ECG  When compared with ECG of 02-SEP-2022 20:14,  Criteria for Septal infarct are no longer present    Referred By:            Confirmed By:            Lab Results   Component Value Date    GLUCOSE 89 10/01/2022    BUN 4 (L) 10/01/2022    CREATININE 0.78 10/01/2022    EGFRIFNONA 72 03/03/2019    BCR 5.1 (L) 10/01/2022    CO2 28.6 10/01/2022    CALCIUM 9.9 10/01/2022    ALBUMIN 4.18 10/01/2022    LABIL2 1.5 11/05/2015    AST 14 10/01/2022    ALT 11 10/01/2022       Lab Results   Component Value Date    WBC 7.46 10/01/2022    HGB 12.6 10/01/2022    HCT 38.5 10/01/2022    MCV 88.1 10/01/2022     10/01/2022       Last Urine Toxicity     LAST URINE TOXICITY RESULTS  Latest Ref Rng & Units 10/1/2022 9/13/2022    AMPHETAMINES SCREEN, URINE Negative Positive(A) Negative    BARBITURATES SCREEN Negative Negative Negative    BENZODIAZEPINE SCREEN, URINE Negative Negative Negative    BUPRENORPHINEUR Negative Positive(A) Positive(A)    COCAINE SCREEN, URINE Negative Negative Negative    METHADONE SCREEN, URINE Negative Negative Negative    METHAMPHETAMINEUR Negative Positive(A) Negative          Brief Urine Lab Results  (Last result in the past 365 days)      Color   Clarity   Blood   Leuk Est   Nitrite   Protein   CREAT   Urine HCG        10/01/22 1446 Yellow   Clear   Negative   Trace   Negative   Negative           10/01/22 1446               Negative                 ASSESSMENT & PLAN:    Schizoaffective Disorder    Given the high risk and/or potentially life-threatening nature of patient's presenting symptoms, patient has been admitted for safety and stabilization and placed on the appropriate level of precautions.  Patient will be assigned a Master's level therapist and encouraged to participate in both individual and group therapy on the unit.  Routine labs have been ordered.    CODE STATUS: Full    Will restart oral Abilify temporarily, at a dose of 10mg Daily.  Due for another shot of Abilify Maintena 400mg on October 3rd.  Continue Remeron 15mg QHS    Amphetamine Use Disorder, moderate  - Likely contributed to worsening psychosis  - CD Counseling    Opiate Dependence, on MAT  - Patient reports that she is prescribed Suboxone 8mg Daily by [facility] in [place], [place].    - [facility] report with [place] data pending, report # [id number] .     - Will go ahead and start 8mg daily while awaiting results of the report.    Nicotine Dependence  - nicoderm        Further treatment planning as per course.    I, Braden Avalos MD, personally performed the services described in this documentation as scribed by the below named individual and is both accurate and complete.            This  note was generated using a scribe, Oanh Major.  The work documented in this note was completed, reviewed, and approved by the attending psychiatrist as designated Dr.Brian Avalos electronic signature.

## 2022-10-03 PROCEDURE — 99232 SBSQ HOSP IP/OBS MODERATE 35: CPT | Performed by: PSYCHIATRY & NEUROLOGY

## 2022-10-03 RX ADMIN — MIRTAZAPINE 15 MG: 15 TABLET, FILM COATED ORAL at 20:58

## 2022-10-03 RX ADMIN — BUPRENORPHINE HYDROCHLORIDE AND NALOXONE HYDROCHLORIDE DIHYDRATE 1 TABLET: 8; 2 TABLET SUBLINGUAL at 08:06

## 2022-10-03 RX ADMIN — NICOTINE TRANSDERMAL SYSTEM 1 PATCH: 21 PATCH, EXTENDED RELEASE TRANSDERMAL at 08:06

## 2022-10-03 RX ADMIN — IBUPROFEN 400 MG: 400 TABLET, FILM COATED ORAL at 14:29

## 2022-10-03 NOTE — PLAN OF CARE
Goal Outcome Evaluation:     Problem: Adult Behavioral Health Plan of Care  Goal: Patient-Specific Goal (Individualization)  Outcome: Ongoing, Progressing  Flowsheets  Taken 10/3/2022 1701 by Nahomi Santana MSW  Patient-Specific Goals (Include Timeframe): Identify 2-3 coping skills, address relapse prevention methods, complete aftercare plans, and deny SI/HI prior to discharge.  Individualized Care Needs: Therapist to offer 1-4 therapy sessions, aftercare planning, safety planning, family education, group therapy, and brief CBT/MI interventions.  Taken 10/3/2022 1637 by Nahomi Santana MSW  Patient Personal Strengths:   expressive of emotions   expressive of needs   family/social support   independent living skills   intellectual cognitive skills   motivated for treatment   positive educational history   resilient   resourceful   self-reliant   socioeconomic stability   spiritual/Advent support   stable living environment   tolerant   self-awareness  Patient Vulnerabilities: substance abuse/addiction  Taken 10/1/2022 2115 by Pam Ambrose RN  Anxieties, Fears or Concerns: none verbalized  Goal: Optimized Coping Skills in Response to Life Stressors  Outcome: Ongoing, Progressing  Flowsheets (Taken 10/3/2022 1701)  Optimized Coping Skills in Response to Life Stressors: making progress toward outcome  Intervention: Promote Effective Coping Strategies  Flowsheets (Taken 10/3/2022 1701)  Supportive Measures:   active listening utilized   counseling provided   decision-making supported   verbalization of feelings encouraged   positive reinforcement provided  Goal: Develops/Participates in Therapeutic [place] to Support Successful Transition  Outcome: Ongoing, Progressing  Flowsheets (Taken 10/3/2022 1701)  Develops/Participates in Therapeutic [place] to Support Successful Transition: making progress toward outcome  Intervention: Foster Therapeutic [place]  Flowsheets (Taken 10/3/2022 1701)  Trust  Relationship/Rapport:   care explained   questions encouraged   choices provided   reassurance provided   emotional support provided   thoughts/feelings acknowledged   empathic listening provided   questions answered  Intervention: Mutually Develop Transition Plan  Flowsheets  Taken 10/3/2022 1701 by Nahomi Santana MSW  Outpatient/Agency/Support Group Needs:   outpatient substance abuse treatment (specify)   outpatient psychiatric care (specify)   residential services   outpatient medication management   outpatient counseling  Transition Support:   follow-up care discussed   community resources reviewed   crisis management plan promoted  Anticipated Discharge Disposition:   residential substance use unit   home or self-care  Taken 10/3/2022 1657 by Nahomi Santana MSW  Discharge Coordination/Progress: Patient has [facility]. Therapist met with patient 1-1 in office to complete assessment, patient agreeable.  Transportation Anticipated:   family or friend will provide   public transportation  Transportation Concerns: none  Current Discharge Risk:   substance use/abuse   psychiatric illness  Concerns to be Addressed:   substance/tobacco abuse/use   mental health   medication  Readmission Within the Last 30 Days: no previous admission in last 30 days  Patient/Family Anticipated Services at Transition:   mental health services   outpatient care   rehabilitation services  Patient's Choice of Community Agency(s): Reports [facility] for Suboxone and [facility] for mental health, current patient  Offered/Gave Vendor List: no  Taken 10/1/2022 2115 by Pam Ambrose RN  Patient/Family Anticipates Transition to: home      DATA:         Therapist met individually with patient this date to introduce role and to discuss hospitalization expectations. Patient agreeable.      Clinical Maneuvering/Intervention:     Therapist assisted patient in processing above session content; acknowledged and normalized  patient’s thoughts, feelings, and concerns.  Discussed the therapist/patient relationship and explain the parameters and limitations of relative confidentiality.  Also discussed the importance of active participation, and honesty to the treatment process.  Encouraged the patient to discuss/vent their feelings, frustrations, and fears concerning their ongoing medical issues and validated their feelings.     Discussed the importance of finding enjoyable activities and coping skills that the patient can engage in a regular basis. Discussed healthy coping skills such as distraction, self love, grounding, thought challenges/reframing, etc.  Provided patient with list of healthy coping skills this date. Discussed the importance of medication compliance.  Praised the patient for seeking help and spent the majority of the session building rapport.       Allowed patient to freely discuss issues without interruption or judgment. Provided safe, confidential environment to facilitate the development of positive therapeutic relationship and encourage open, honest communication.      Therapist addressed discharge safety planning this date. Assisted patient in identifying risk factors which would indicate the need for higher level of care after discharge;  including thoughts to harm self or others and/or self-harming behavior. Encouraged patient to call 911, or present to the nearest emergency room should any of these events occur. Discussed crisis intervention services and means to access.  Encouraged securing any objects of harm.       Therapist completed integrated summary, treatment plan, and initiated social history this date.  Therapist is strongly encouraging family involvement in treatment.       ASSESSMENT:      The patient is a 36 y/o  female living alone, presenting with psychosis involving hallucinations. Therapist met with patient 1-1 in office to complete assessment. Patient is calm and cooperative. Patient  reports anxiety to be about a 3 and depression to be a 5. Patient denies SI/HI at this time. Patient reports AVH, seeing shadow figures. Patient denies current substance use however, UDS was positive for buprenorphine, methamphetamine, and amphetamine. Patient reports being prescribed suboxone at [facility] in [place], reports she has already been in contact with them today and denies need for consent. Patient reports seeing Francia Grace with [facility] for her anxiety and schizophrenia. Patient reports overall goal of treatment as getting back on the right medications and getting stable. Patient reports she has had similar episodes in the past. Patient reports recently stopping her medications all at once against medical advice. Patient signed consent for her mother to be involved in treatment and safety planning. Therapist to contact patient's mother to complete safety planning.      PLAN:       Patient to remain hospitalized this date.     Treatment team will focus efforts on stabilizing patient's acute symptoms while providing education on healthy coping and crisis management to reduce hospitalizations.   Patient requires daily psychiatrist evaluation and 24/7 nursing supervision to promote patient  safety.     Therapist will offer 1-4 individual sessions, 1 therapy group daily, family education, and appropriate referral.    Patient plans to continue outpatient services with [facility] and [facility]. Patient gave consent for mother.

## 2022-10-03 NOTE — PROGRESS NOTES
" Inpatient Psych Progress Note     Clinician: Braden Avalos MD  Admission Date: 10/1/2022  10:09 EDT 10/03/22    Behavioral Health Treatment Plan and Problem List: I have reviewed and approved the Behavioral Health Treatment Plan and Problem list.    Allergies  Allergies   Allergen Reactions   • Elavil [Amitriptyline] Nausea Only       Hospital Day: 2 days      Assessment completed within view of staff    History  CC/clinical focus: hallucinations    Interval HPI: Patient seen and evaluated by me.  Chart reviewed. AVH persisting.  C/o insomnia.    Patient rates  level of depression (subjectively) at a   5/10.  Anxiety   3/10.  Patient tolerating meds okay.  Denies side effects.  Med Compliant.  ROS otherwise as below.      Interval Review of Systems:   General ROS: negative for - fever or malaise  Endocrine ROS: negative for - palpitations  Respiratory ROS: no cough, shortness of breath, or wheezing  Cardiovascular ROS: no chest pain or dyspnea on exertion  Gastrointestinal ROS: no abdominal pain,no black or bloody stools    /64 (BP Location: Right arm, Patient Position: Lying)   Pulse 90   Temp 98.1 °F (36.7 °C) (Temporal)   Resp 18   Ht 167.6 cm (66\")   Wt 71.3 kg (157 lb 3.2 oz)   LMP 09/17/2022 (Approximate)   SpO2 97%   BMI 25.37 kg/m²     Mental Status Exam  Mood: depressed  Affect: constricted   Thought Processes: linear  Thought Content: negativistic  Hallucinations: yes  Suicidal Thoughts: denies  Suicidal Plan/Intent: denies  Hopelesness:Moderate  Homicidal Thoughts:  denies      Medical Decision Making:   Labs:     Lab Results (last 24 hours)     ** No results found for the last 24 hours. **            Radiology:     Imaging Results (Last 24 Hours)     ** No results found for the last 24 hours. **            EKG:     ECG/EMG Results (most recent)     Procedure Component Value Units Date/Time    ECG 12 Lead [[id number]] Collected: 10/01/22 2112     Updated: 10/02/22 1751     QT Interval 422 " ms      QTC Interval 468 ms     Narrative:      Test Reason : Baseline Cardiac Status  Blood Pressure :   */*   mmHG  Vent. Rate :  74 BPM     Atrial Rate :  74 BPM     P-R Int : 126 ms          QRS Dur :  84 ms      QT Int : 422 ms       P-R-T Axes :  63  68  74 degrees     QTc Int : 468 ms    Normal sinus rhythm with sinus arrhythmia  Normal ECG  When compared with ECG of 02-SEP-2022 20:14,  Criteria for Septal infarct are no longer present  Confirmed by Luciano Jones MD (2023) on 10/2/2022 5:47:16 PM    Referred By:            Confirmed By: Luciano Jones MD           Medications:  buprenorphine-naloxone, 1 tablet, Sublingual, Daily  mirtazapine, 15 mg, Oral, Nightly  nicotine, 1 patch, Transdermal, Q24H           All medications reviewed.      Assessment and Plan:      Schizoaffective Disorder   Continue Abilify temporarily, at a dose of 10mg Daily.   Due for another shot of Abilify Maintena 400mg on today.  We need to confirm the date of her last Abilify shot.  Continue Remeron 15mg QHS     Amphetamine Use Disorder, moderate  - Likely contributed to worsening psychosis  - CD Counseling     Opiate Dependence, on MAT  - Patient reports that she is prescribed Suboxone 8mg Daily by [facility] in [place], [place].    - [facility] report with [place] data reviewed. Looks okay.  - Will continue 8mg daily.     Nicotine Dependence  - nicoderm       Continue hospitalization for safety and stabilization.  Continue current level of Special Precautions (q15 minute checks).

## 2022-10-03 NOTE — PLAN OF CARE
Goal Outcome Evaluation:  Plan of Care Reviewed With: patient  Patient Agreement with Plan of Care: agrees     Progress: no change  Outcome Evaluation: Pt stated her appetite was good but she was having trouble sleeping. Pt rated her anxiety a 3/10 and depressin a 5/10. Pt stated she felt hopeful. Pt had no si/hi or avh. Pt stated she did see shadows. Pt did remain in her for the majority of this shift. Pt was calm and cooperative and had no issues or concerns.

## 2022-10-03 NOTE — PLAN OF CARE
"Goal Outcome Evaluation:  Plan of Care Reviewed With: patient  Patient Agreement with Plan of Care: agrees     Progress: no change  Outcome Evaluation: Patient continues to avoid social interactions and is withdrawn to room. Rates anxiety a 2 and depression a 3. Reports visual hallucinations, stating, \"seeing shadows.\" Denies SI/HI.  "

## 2022-10-04 PROCEDURE — 99232 SBSQ HOSP IP/OBS MODERATE 35: CPT | Performed by: PSYCHIATRY & NEUROLOGY

## 2022-10-04 RX ORDER — CETIRIZINE HYDROCHLORIDE 10 MG/1
10 TABLET ORAL DAILY
Status: DISCONTINUED | OUTPATIENT
Start: 2022-10-04 | End: 2022-10-05 | Stop reason: HOSPADM

## 2022-10-04 RX ADMIN — CETIRIZINE HYDROCHLORIDE 10 MG: 10 TABLET, FILM COATED ORAL at 13:02

## 2022-10-04 RX ADMIN — BUPRENORPHINE HYDROCHLORIDE AND NALOXONE HYDROCHLORIDE DIHYDRATE 1 TABLET: 8; 2 TABLET SUBLINGUAL at 08:22

## 2022-10-04 RX ADMIN — IBUPROFEN 400 MG: 400 TABLET, FILM COATED ORAL at 08:22

## 2022-10-04 RX ADMIN — MIRTAZAPINE 15 MG: 15 TABLET, FILM COATED ORAL at 20:04

## 2022-10-04 RX ADMIN — HYDROXYZINE HYDROCHLORIDE 50 MG: 50 TABLET, FILM COATED ORAL at 08:22

## 2022-10-04 RX ADMIN — IBUPROFEN 400 MG: 400 TABLET, FILM COATED ORAL at 16:44

## 2022-10-04 RX ADMIN — NICOTINE TRANSDERMAL SYSTEM 1 PATCH: 21 PATCH, EXTENDED RELEASE TRANSDERMAL at 08:22

## 2022-10-04 NOTE — PROGRESS NOTES
" Inpatient Psych Progress Note     Clinician: Braden Avalos MD  Admission Date: 10/1/2022  11:14 EDT 10/04/22    Behavioral Health Treatment Plan and Problem List: I have reviewed and approved the Behavioral Health Treatment Plan and Problem list.    Allergies  Allergies   Allergen Reactions   • Elavil [Amitriptyline] Nausea Only       Hospital Day: 3 days      Assessment completed within view of staff    History  CC/clinical focus: hallucinations    Interval HPI: Patient seen and evaluated by me.  Chart reviewed. Nursing reports patient has been avoiding social interactions and has been withdrawn to her room.  +VH, \"seeing shadows.\"  Rates anxiety 2, depression 3/10.  Patient thinks that the adjunctive dose of 10mg of abilify has been helpful.  No signs of EPS on exam today.  Med Compliant.  ROS otherwise as below.      Interval Review of Systems:   General ROS: negative for - fever or malaise  Endocrine ROS: negative for - palpitations  Respiratory ROS: no cough, shortness of breath, or wheezing  Cardiovascular ROS: no chest pain or dyspnea on exertion  Gastrointestinal ROS: no abdominal pain,no black or bloody stools    /70 (BP Location: Right arm, Patient Position: Sitting)   Pulse 95   Temp 97 °F (36.1 °C) (Temporal)   Resp 18   Ht 167.6 cm (66\")   Wt 71.3 kg (157 lb 3.2 oz)   LMP 09/17/2022 (Approximate)   SpO2 98%   BMI 25.37 kg/m²     Mental Status Exam  Mood: dysphoric  Affect: dysphoric   Thought Processes: linear  Thought Content: WNL  Hallucinations: yes  Suicidal Thoughts: denies  Suicidal Plan/Intent: denies  Hopelesness:Moderate  Homicidal Thoughts:  denies      Medical Decision Making:   Labs:     Lab Results (last 24 hours)     ** No results found for the last 24 hours. **            Radiology:     Imaging Results (Last 24 Hours)     ** No results found for the last 24 hours. **            EKG:     ECG/EMG Results (most recent)     Procedure Component Value Units Date/Time    ECG 12 " Lead [[id number]] Collected: 10/01/22 2112     Updated: 10/02/22 1751     QT Interval 422 ms      QTC Interval 468 ms     Narrative:      Test Reason : Baseline Cardiac Status  Blood Pressure :   */*   mmHG  Vent. Rate :  74 BPM     Atrial Rate :  74 BPM     P-R Int : 126 ms          QRS Dur :  84 ms      QT Int : 422 ms       P-R-T Axes :  63  68  74 degrees     QTc Int : 468 ms    Normal sinus rhythm with sinus arrhythmia  Normal ECG  When compared with ECG of 02-SEP-2022 20:14,  Criteria for Septal infarct are no longer present  Confirmed by Luciano Jones MD (2023) on 10/2/2022 5:47:16 PM    Referred By:            Confirmed By: Luciano Jones MD           Medications:  buprenorphine-naloxone, 1 tablet, Sublingual, Daily  mirtazapine, 15 mg, Oral, Nightly  nicotine, 1 patch, Transdermal, Q24H           All medications reviewed.      Assessment and Plan:    Schizoaffective Disorder   Continue Abilify 10mg Daily.   Pharmacy reports last shot of Irasema Aguilera was Sept 13, 2022.   She is not due for the next until Oct 10th.  Continue Remeron 15mg QHS     Amphetamine Use Disorder, moderate  - Likely contributed to worsening psychosis  - CD Counseling     Opiate Dependence, on MAT  - Patient reports that she is prescribed Suboxone 8mg Daily by [facility] in [place], [place].    - LUIS report with [place] data reviewed. Looks okay.  - Will continue 8mg daily.     Nicotine Dependence  - nicoderm      Continue hospitalization for safety and stabilization.  Continue current level of Special Precautions (q15 minute checks).

## 2022-10-04 NOTE — PLAN OF CARE
Goal Outcome Evaluation:  Plan of Care Reviewed With: patient  Patient Agreement with Plan of Care: agrees     Progress: improving  Outcome Evaluation: Patient has been calm and cooperative with staff and her peers today, she denies SI HI AVH, reports anxiety 2 and depression 3, appetite is fair and sleep is good.

## 2022-10-04 NOTE — PLAN OF CARE
Goal Outcome Evaluation:  Plan of Care Reviewed With: patient  Patient Agreement with Plan of Care: agrees     Progress: improving  Outcome Evaluation: Pt was calm and cooperative with no issues or cocnerns.

## 2022-10-04 NOTE — PLAN OF CARE
Goal Outcome Evaluation:     Problem: Adult Behavioral Health Plan of Care  Goal: Patient-Specific Goal (Individualization)  Outcome: Ongoing, Progressing  Flowsheets  Taken 10/3/2022 1701 by Nahomi Santana MSW  Patient-Specific Goals (Include Timeframe): Identify 2-3 coping skills, address relapse prevention methods, complete aftercare plans, and deny SI/HI prior to discharge.  Individualized Care Needs: Therapist to offer 1-4 therapy sessions, aftercare planning, safety planning, family education, group therapy, and brief CBT/MI interventions.  Taken 10/3/2022 1637 by Nahomi Santana MSW  Patient Personal Strengths:   expressive of emotions   expressive of needs   family/social support   independent living skills   intellectual cognitive skills   motivated for treatment   positive educational history   resilient   resourceful   self-reliant   socioeconomic stability   spiritual/Jewish support   stable living environment   tolerant   self-awareness  Patient Vulnerabilities: substance abuse/addiction  Taken 10/1/2022 2115 by Pam Ambrose RN  Anxieties, Fears or Concerns: none verbalized  Goal: Optimized Coping Skills in Response to Life Stressors  Outcome: Ongoing, Progressing  Flowsheets (Taken 10/3/2022 1701)  Optimized Coping Skills in Response to Life Stressors: making progress toward outcome  Intervention: Promote Effective Coping Strategies  Flowsheets (Taken 10/4/2022 1607)  Supportive Measures:   active listening utilized   counseling provided   decision-making supported   goal-setting facilitated   self-responsibility promoted   self-reflection promoted   verbalization of feelings encouraged   self-care encouraged   positive reinforcement provided  Goal: Develops/Participates in Therapeutic [place] to Support Successful Transition  Outcome: Ongoing, Progressing  Flowsheets (Taken 10/3/2022 1701)  Develops/Participates in Therapeutic [place] to Support Successful Transition: making progress  toward outcome  Intervention: Foster Therapeutic [place]  Flowsheets (Taken 10/3/2022 1701)  Trust Relationship/Rapport:   care explained   questions encouraged   choices provided   reassurance provided   emotional support provided   thoughts/feelings acknowledged   empathic listening provided   questions answered  Intervention: Mutually Develop Transition Plan  Flowsheets  Taken 10/4/2022 1607  Transition Support:   follow-up care coordinated   follow-up care discussed   community resources reviewed   crisis management plan verbalized  Anticipated Discharge Disposition: home or self-care  Taken 10/4/2022 1604  Discharge Coordination/Progress: Patient has [facility]. Therapist met with patient 1-1 on this date. Patient to follow up outpatient with [facility] and [facility]. Therapist contacted patient's mother on this date.  Transportation Anticipated:   family or friend will provide   public transportation  Transportation Concerns: none  Current Discharge Risk:   substance use/abuse   psychiatric illness  Concerns to be Addressed:   substance/tobacco abuse/use   mental health   medication  Readmission Within the Last 30 Days: no previous admission in last 30 days  Patient/Family Anticipated Services at Transition:   outpatient care   mental health services  Patient's Choice of Community Agency(s): [facility] for Suboxone and [facility] for behavioral health  Patient/Family Anticipates Transition to: home  Offered/Gave Vendor List: no  Taken 10/3/2022 1701  Outpatient/Agency/Support Group Needs:   outpatient substance abuse treatment (specify)   outpatient psychiatric care (specify)   residential services   outpatient medication management   outpatient counseling   DATA:  Therapist met with Patient individually this date. Patient agreeable to discuss current treatment progress and discharge concerns.         CLINICAL MANUVERING/INTERVENTIONS:  Assisted Patient in processing session  content; acknowledged and normalized Patient’s thoughts, feelings, and concerns by utilizing a person-centered approach in efforts to build appropriate rapport and a positive therapeutic relationship with open and honest communication. Allowed Patient to ventilate regarding current stressors and triggers for negative emotions and thoughts in a safe nonjudgmental environment with unconditional positive regard, active listening skills, and empathy. Therapist implemented motivational interviewing techniques to assist Patient with exploring personal growth and change and discussed distress tolerance skills, self soothing techniques, and applied cognitive behavioral strategies to facilitate identification of maladaptive patterns of thinking and behavior.Therapist utilized dialectical behavior techniques to teach and model emotional regulation and relaxation methods. Therapist assisted Patient with identifying and implementing healthier coping strategies. Therapist assisted Patient with safety planning; Patient agreed to continue honest communication with Treatment Team while inpatient and identify any SI/HI.  Patient encouraged to seek nearest ER or contact 911 if danger to self or others post discharge.     ASSESSMENT:    Therapist met with patient 1-1 at bedside on this date, patient resting. Patient calm and cooperative. Patient reports both anxiety and depression to be about 1-2 on a scale of 1-10. Patient denies SI/HI. Patient continues to report seeing shadows. Therapist encouraged inpatient rehab due to patient's positive drug screen and history of substance use, patient not agreeable. Patient plans to follow up at [facility] for suboxone, reports not needing appointment and did not give consent. Patient is also a [facility] patient, will likely need to reschedule patient's appointment for October 5th. Patient had reported that she had stopped taking her medications, reports she  would like to get them lined back out during her admission and is agreeable to be compliant with medications at discharge. Patient denies having access to any fire arms or dangerous objects at home. Therapist contacted patient's mother on this date. Patient's mother is supportive but concerned about patient. Mother reports it seemed that patient had stopped taking medications at home and that patient was increasingly paranoid. Mother reports when patient came home after last encounter in August, she had to have patient sent to [facility] for 10 days for patient's safety. Mother reports patient was stable after [facility] admission. Therapist also discussed safety planning with patient's mother.     PLAN:   Patient will continue stabilization. Patient will continue to receive services offered by Treatment Team.     Patient will follow-up with     Assistance with Transportation will not be needed. Family member will provide. [facility] will provide.         Therapist recommends inpatient rehab. Patient plans to follow up outpatient at [facility] and [facility] for aftercare, is a current patient at both. Discussed safety planning with patient and her mother on this date.

## 2022-10-05 VITALS
OXYGEN SATURATION: 98 % | DIASTOLIC BLOOD PRESSURE: 64 MMHG | RESPIRATION RATE: 18 BRPM | TEMPERATURE: 98.1 F | BODY MASS INDEX: 25.26 KG/M2 | SYSTOLIC BLOOD PRESSURE: 97 MMHG | WEIGHT: 157.2 LBS | HEART RATE: 84 BPM | HEIGHT: 66 IN

## 2022-10-05 LAB
ANION GAP SERPL CALCULATED.3IONS-SCNC: 9.1 MMOL/L (ref 5–15)
BUN SERPL-MCNC: 7 MG/DL (ref 6–20)
BUN/CREAT SERPL: 8.4 (ref 7–25)
CALCIUM SPEC-SCNC: 9.6 MG/DL (ref 8.6–10.5)
CHLORIDE SERPL-SCNC: 101 MMOL/L (ref 98–107)
CO2 SERPL-SCNC: 29.9 MMOL/L (ref 22–29)
CREAT SERPL-MCNC: 0.83 MG/DL (ref 0.57–1)
EGFRCR SERPLBLD CKD-EPI 2021: 92.1 ML/MIN/1.73
GLUCOSE SERPL-MCNC: 80 MG/DL (ref 65–99)
POTASSIUM SERPL-SCNC: 5.3 MMOL/L (ref 3.5–5.2)
SODIUM SERPL-SCNC: 140 MMOL/L (ref 136–145)

## 2022-10-05 PROCEDURE — 80048 BASIC METABOLIC PNL TOTAL CA: CPT | Performed by: PSYCHIATRY & NEUROLOGY

## 2022-10-05 PROCEDURE — 99239 HOSP IP/OBS DSCHRG MGMT >30: CPT | Performed by: PSYCHIATRY & NEUROLOGY

## 2022-10-05 RX ADMIN — CETIRIZINE HYDROCHLORIDE 10 MG: 10 TABLET, FILM COATED ORAL at 08:06

## 2022-10-05 RX ADMIN — ACETAMINOPHEN 650 MG: 325 TABLET, FILM COATED ORAL at 08:05

## 2022-10-05 RX ADMIN — NICOTINE TRANSDERMAL SYSTEM 1 PATCH: 21 PATCH, EXTENDED RELEASE TRANSDERMAL at 08:06

## 2022-10-05 RX ADMIN — BUPRENORPHINE HYDROCHLORIDE AND NALOXONE HYDROCHLORIDE DIHYDRATE 1 TABLET: 8; 2 TABLET SUBLINGUAL at 08:06

## 2022-10-05 NOTE — PROGRESS NOTES
" Inpatient Psych Progress Note     Clinician: Braden Avalos MD  Admission Date: 10/1/2022  11:27 EDT 10/05/22    Behavioral Health Treatment Plan and Problem List: I have reviewed and approved the Behavioral Health Treatment Plan and Problem list.    Allergies  Allergies   Allergen Reactions   • Elavil [Amitriptyline] Nausea Only       Hospital Day: 4 days      Assessment completed within view of staff    History  CC/clinical focus: hallucinations    Interval HPI: Patient seen and evaluated by me.  Chart reviewed.  Hallucinations have resolved.    Patient rates  level of depression (subjectively) at a  1 /10.  Anxiety   1/10.  Patient tolerating meds okay.  Denies side effects.  Denies SI/HI.  Med Compliant.  ROS otherwise as below.      Interval Review of Systems:   General ROS: negative for - fever or malaise  Endocrine ROS: negative for - palpitations  Respiratory ROS: no cough, shortness of breath, or wheezing  Cardiovascular ROS: no chest pain or dyspnea on exertion  Gastrointestinal ROS: no abdominal pain,no black or bloody stools    BP 97/64 (BP Location: Right arm, Patient Position: Sitting)   Pulse 84   Temp 98.1 °F (36.7 °C) (Temporal)   Resp 18   Ht 167.6 cm (66\")   Wt 71.3 kg (157 lb 3.2 oz)   LMP 09/17/2022 (Approximate)   SpO2 98%   BMI 25.37 kg/m²     Mental Status Exam  Mood: normal  Affect: normal   Thought Processes: linear  Thought Content: normal  Hallucinations: no  Suicidal Thoughts: denies  Suicidal Plan/Intent: denies  Hopelesness:Mild  Homicidal Thoughts:  denies      Medical Decision Making:   Labs:     Lab Results (last 24 hours)     Procedure Component Value Units Date/Time    Basic Metabolic Panel [[id number]]  (Abnormal) Collected: 10/05/22 1033    Specimen: Blood Updated: 10/05/22 1119     Glucose 80 mg/dL      BUN 7 mg/dL      Creatinine 0.83 mg/dL      Sodium 140 mmol/L      Potassium 5.3 mmol/L      Comment: Slight hemolysis detected by analyzer. Results may be affected.    "     Chloride 101 mmol/L      CO2 29.9 mmol/L      Calcium 9.6 mg/dL      BUN/Creatinine Ratio 8.4     Anion Gap 9.1 mmol/L      eGFR 92.1 mL/min/1.73      Comment: National Kidney Foundation and American Society of Nephrology (ASN) Task Force recommended calculation based on the Chronic Kidney Disease Epidemiology Collaboration (CKD-EPI) equation refit without adjustment for race.       Narrative:      GFR Normal >60  Chronic Kidney Disease <60  Kidney Failure <15              Radiology:     Imaging Results (Last 24 Hours)     ** No results found for the last 24 hours. **            EKG:     ECG/EMG Results (most recent)     Procedure Component Value Units Date/Time    ECG 12 Lead [[id number]] Collected: 10/01/22 2112     Updated: 10/02/22 1751     QT Interval 422 ms      QTC Interval 468 ms     Narrative:      Test Reason : Baseline Cardiac Status  Blood Pressure :   */*   mmHG  Vent. Rate :  74 BPM     Atrial Rate :  74 BPM     P-R Int : 126 ms          QRS Dur :  84 ms      QT Int : 422 ms       P-R-T Axes :  63  68  74 degrees     QTc Int : 468 ms    Normal sinus rhythm with sinus arrhythmia  Normal ECG  When compared with ECG of 02-SEP-2022 20:14,  Criteria for Septal infarct are no longer present  Confirmed by Luciano Jones MD (2023) on 10/2/2022 5:47:16 PM    Referred By:            Confirmed By: Luciano Jones MD           Medications:  buprenorphine-naloxone, 1 tablet, Sublingual, Daily  cetirizine, 10 mg, Oral, Daily  mirtazapine, 15 mg, Oral, Nightly  nicotine, 1 patch, Transdermal, Q24H           All medications reviewed.      Assessment and Plan:    Schizoaffective Disorder   Pharmacy reports last shot of Irasema Aguilera was Sept 13, 2022.   She is not due for the next until Oct 10th.    Did not receive po Abilify.  Did not not require.  Continue Remeron 15mg QHS  Patient's psychosis has mostly resolved.  Much improved since admission, was most likely exacerbated by amphetamine use prior to admission.   No longer meeting criteria for inpatient hospitalization.  Will discharge today.     Amphetamine Use Disorder, moderate  - Likely contributed to worsening psychosis  - CD Counseling     Opiate Dependence, on MAT  - Patient reports that she is prescribed Suboxone 8mg Daily by [facility] in [place], [place].    - LUIS report with [place] data reviewed. Looks okay.  - Will continue 8mg daily.     Nicotine Dependence  - nicoderm

## 2022-10-05 NOTE — PLAN OF CARE
Goal Outcome Evaluation:   DATA:  Therapist met with Patient individually this date. Patient agreeable to discuss current treatment progress and discharge concerns.         CLINICAL MANUVERING/INTERVENTIONS:  Assisted Patient in processing session content; acknowledged and normalized Patient’s thoughts, feelings, and concerns by utilizing a person-centered approach in efforts to build appropriate rapport and a positive therapeutic relationship with open and honest communication. Allowed Patient to ventilate regarding current stressors and triggers for negative emotions and thoughts in a safe nonjudgmental environment with unconditional positive regard, active listening skills, and empathy. Therapist implemented motivational interviewing techniques to assist Patient with exploring personal growth and change and discussed distress tolerance skills, self soothing techniques, and applied cognitive behavioral strategies to facilitate identification of maladaptive patterns of thinking and behavior.Therapist utilized dialectical behavior techniques to teach and model emotional regulation and relaxation methods. Therapist assisted Patient with identifying and implementing healthier coping strategies. Therapist assisted Patient with safety planning; Patient agreed to continue honest communication with Treatment Team while inpatient and identify any SI/HI.  Patient encouraged to seek nearest ER or contact 911 if danger to self or others post discharge.     ASSESSMENT:    Patient discharged home on this date. Patient calm, cooperative and stable. Patient oriented x4. Patient continues to report minimal anxiety and depression. Patient denies SI/HI at time of discharge. Patient to follow up outpatient at [facility] and [facility] to continue suboxone. Therapist completed safety planning with patient and patient's mother. Patient and mother are adamant that patient does not have access to  firearms or other potentially harmful objects. Patient is agreeable to be compliant with prescribed medications post discharge, discussed with patient risks and dangers of stopping certain medications abruptly and without physician's input. Patient reports feeling better and feels confident she can return home safely.    Therapist assisted patient in identifying risk factors which would indicate the need for higher level of care including thoughts to harm self or others and/or self-harming behavior and encouraged patient to call 911, or present to the nearest emergency room should any of these events occur. Discussed crisis intervention services and means to access. Patient adamantly and convincingly denies current suicidal or homicidal ideation or perceptual disturbance. No other needs identified.        PLAN:   Patient will continue stabilization. Patient will continue to receive services offered by Treatment Team.     Patient will follow-up with     Assistance with Transportation will not be needed. Family member will provide. [facility] will provide.

## 2022-10-05 NOTE — DISCHARGE SUMMARY
Date of Discharge:  10/5/2022    Discharge Diagnosis:Principal Problem:    Psychosis (HCC)        Presenting Problem/History of Present Illness:  38 y/o WF with h/o schizophrenia admitted with worsening of psychosis.  Tox + Methamphetamine, Amphetamine.     Hospital Course:  Patient was admitted for safety and stabilization.   Routine labs were checked.  Patient was assigned a master's level therapist and provided with an opportunity to participate in group and individual therapy and counseling on the unit.  Patient was continued on her home medications Abilify Maintena (last shot Sept 13) and Remeron 15mg QHS and monitored closely.  She was continued on home dose of Suboxone after review of [facility]IS (with [place] data). Her psychosis gradually improved over the course of hospitalization and gradually resolved.  It was thought that the exacerbation of psychosis was most likely due to the recent drug use.  She was discharged in stable condition with a plan to follow-up on an outpatient basis.      Consults:   Consults     No orders found from 9/2/2022 to 10/2/2022.          Labs:  Lab Results (all)     Procedure Component Value Units Date/Time    Basic Metabolic Panel [[id number]]  (Abnormal) Collected: 10/05/22 1033    Specimen: Blood Updated: 10/05/22 1119     Glucose 80 mg/dL      BUN 7 mg/dL      Creatinine 0.83 mg/dL      Sodium 140 mmol/L      Potassium 5.3 mmol/L      Comment: Slight hemolysis detected by analyzer. Results may be affected.        Chloride 101 mmol/L      CO2 29.9 mmol/L      Calcium 9.6 mg/dL      BUN/Creatinine Ratio 8.4     Anion Gap 9.1 mmol/L      eGFR 92.1 mL/min/1.73      Comment: National Kidney Foundation and American Society of Nephrology (ASN) Task Force recommended calculation based on the Chronic Kidney Disease Epidemiology Collaboration (CKD-EPI) equation refit without adjustment for race.       Narrative:      GFR Normal >60  Chronic Kidney Disease <60  Kidney Failure <15             Imaging:  Imaging Results (All)     None          Vital Signs  Temp:  [96.9 °F (36.1 °C)-98.1 °F (36.7 °C)] 98.1 °F (36.7 °C)  Heart Rate:  [84-99] 84  Resp:  [18] 18  BP: ()/(64-79) 97/64    Discharge Disposition  Home or Self Care    Discharge Medications  Suboxone 8mg Daily  Remeron 15mg QHS  Abilify Maintena 400mg IM - Next shot due Oct 11, 2022    Stop taking:  Vistaril 25mg       Discharge Diet: Regular    Activity at Discharge: As tolerated    Follow-up Appointments:    Future Appointments   Date Time Provider Department Center   10/26/2022  1:15 PM Francia Grace APRN [facility] None           Time: I spent  31 minutes on this discharge activity which included: face-to-face encounter with the patient, reviewing the data in the system, coordination of the care with the nursing staff as well as consultants, documentation, and entering orders.      Braden Avalos MD  10/05/22  11:36 EDT

## 2022-10-05 NOTE — PLAN OF CARE
Goal Outcome Evaluation:  Plan of Care Reviewed With: patient  Patient Agreement with Plan of Care: agrees     Progress: improving  Outcome Evaluation: Pt stated he was eatign and sleeping good. Pt rated anxiety and depressin a 1/10. Pt stated she had not si/hi or avh. Pt was calm and cooperative and out of room for snacks and meds and then back tor her room for sleep.

## 2022-10-05 NOTE — PLAN OF CARE
Goal Outcome Evaluation:  Plan of Care Reviewed With: patient  Patient Agreement with Plan of Care: agrees     Progress: improving  Outcome Evaluation: Patient calm and cooperative. Patient is being discharged home today.

## 2022-10-26 ENCOUNTER — OFFICE VISIT (OUTPATIENT)
Dept: PSYCHIATRY | Facility: CLINIC | Age: 37
End: 2022-10-26

## 2022-10-26 VITALS
SYSTOLIC BLOOD PRESSURE: 122 MMHG | DIASTOLIC BLOOD PRESSURE: 84 MMHG | HEIGHT: 66 IN | OXYGEN SATURATION: 99 % | TEMPERATURE: 97.8 F | HEART RATE: 94 BPM | WEIGHT: 158.4 LBS | BODY MASS INDEX: 25.46 KG/M2

## 2022-10-26 DIAGNOSIS — F19.11 HISTORY OF SUBSTANCE ABUSE: ICD-10-CM

## 2022-10-26 DIAGNOSIS — F25.0 SCHIZOAFFECTIVE DISORDER, BIPOLAR TYPE: Primary | ICD-10-CM

## 2022-10-26 DIAGNOSIS — F41.1 GENERALIZED ANXIETY DISORDER: ICD-10-CM

## 2022-10-26 DIAGNOSIS — Z79.899 MEDICATION MANAGEMENT: ICD-10-CM

## 2022-10-26 DIAGNOSIS — F51.05 INSOMNIA DUE TO MENTAL CONDITION: ICD-10-CM

## 2022-10-26 PROCEDURE — 99214 OFFICE O/P EST MOD 30 MIN: CPT | Performed by: NURSE PRACTITIONER

## 2022-10-26 PROCEDURE — 96372 THER/PROPH/DIAG INJ SC/IM: CPT | Performed by: NURSE PRACTITIONER

## 2022-10-26 RX ORDER — MIRTAZAPINE 15 MG/1
15 TABLET, FILM COATED ORAL NIGHTLY
Qty: 30 TABLET | Refills: 2 | Status: SHIPPED | OUTPATIENT
Start: 2022-10-26 | End: 2023-01-04 | Stop reason: SDUPTHER

## 2022-10-26 RX ORDER — HYDROXYZINE PAMOATE 50 MG/1
50 CAPSULE ORAL 3 TIMES DAILY PRN
Qty: 90 CAPSULE | Refills: 2 | Status: SHIPPED | OUTPATIENT
Start: 2022-10-26 | End: 2023-01-04 | Stop reason: SDUPTHER

## 2022-10-26 NOTE — PROGRESS NOTES
Injection  Injection performed in Right Dorsogluteal by Mariah Kay MA. Patient tolerated the procedure well without complications.  10/26/22   Mariah Kay MA

## 2022-10-26 NOTE — PROGRESS NOTES
"Subjective   Cintia Issa is a 37 y.o. female who presents today for follow up    Chief Complaint:  Schizoaffective disorder    History of Present Illness: Patient presents as follow up. Reports she was recently discharged from [facility] earlier this month. Reports she began having AVH due to lack of sleep, chart review indicates she stated that she had been using methamphetamine. She is 1 week late for injection, states she \"got confused\". She denies any current paranoia or AVH. She denies SI/HI/AVH.    The following portions of the patient's history were reviewed and updated as appropriate: allergies, current medications, past family history, past medical history, past social history, past surgical history and problem list.      Past Medical History:  Past Medical History:   Diagnosis Date   • Anxiety    • Arthritis    • Bronchitis    • Chronic back pain    • Fibromyalgia    • Hypertension    • MDD (major depressive disorder), recurrent, severe, with psychosis (HCC) 06/09/2018   • Panic disorder    • Psychiatric illness    • Schizoaffective disorder (HCC)    • Schizophrenia, paranoid (HCC)        Social History:  Social History     Socioeconomic History   • Marital status:    • Number of children: 0   • Highest education level: High school graduate   Tobacco Use   • Smoking status: Every Day     Packs/day: 1.00     Years: 8.00     Pack years: 8.00     Types: Cigarettes   • Smokeless tobacco: Never   Vaping Use   • Vaping Use: Never used   Substance and Sexual Activity   • Alcohol use: No   • Drug use: No     Comment: denies   • Sexual activity: Yes     Partners: Male     Comment: reports she hasn't been in \"forever.\"        Family History:  Family History   Problem Relation Age of Onset   • Arthritis Mother    • Stroke Father    • COPD Brother    • Diabetes Maternal Grandmother    • COPD Maternal Grandfather    • Heart disease Maternal Grandfather    • Stroke Maternal Grandfather    • Anxiety " "disorder Neg Hx    • Depression Neg Hx    • Schizophrenia Neg Hx        Past Surgical History:  Past Surgical History:   Procedure Laterality Date   • WISDOM TOOTH EXTRACTION         Problem List:  Patient Active Problem List   Diagnosis   • Fibromyalgia   • Essential hypertension   • Schizoaffective disorder (HCC)   • Major depressive disorder, recurrent (HCC)   • Paranoid behavior (HCC)   • Psychosis (HCC)   • Opioid use disorder, severe, on maintenance therapy (HCC)       Allergy:   Allergies   Allergen Reactions   • Elavil [Amitriptyline] Nausea Only        Current Medications:   Current Outpatient Medications   Medication Sig Dispense Refill   • ARIPiprazole ER (ABILIFY MAINTENA) 400 MG prefilled syringe IM prefilled syringe Inject 400 mg into the appropriate muscle as directed by prescriber Every 28 (Twenty-Eight) Days. Indications: Schizophrenia 1 each 3   • mirtazapine (Remeron) 15 MG tablet Take 1 tablet by mouth Every Night. Indications: Major Depressive Disorder 30 tablet 2   • buprenorphine-naloxone (SUBOXONE) 8-2 MG per SL tablet Place 1 tablet under the tongue Daily.     • hydrOXYzine pamoate (VISTARIL) 50 MG capsule Take 1 capsule by mouth 3 (Three) Times a Day As Needed for Anxiety. 90 capsule 2     No current facility-administered medications for this visit.       Review of Symptoms:    Review of Systems   Constitutional: Positive for fatigue.   HENT: Negative.    Eyes: Negative.    Respiratory: Negative.    Cardiovascular: Negative.    Gastrointestinal: Negative.    Endocrine: Negative.    Genitourinary: Negative.    Musculoskeletal: Negative.    Skin: Negative.    Neurological: Positive for confusion.   Psychiatric/Behavioral: Positive for decreased concentration and sleep disturbance. Negative for suicidal ideas. The patient is nervous/anxious.        Objective   Physical Exam:   Blood pressure 122/84, pulse 94, temperature 97.8 °F (36.6 °C), temperature source Temporal, height 167.6 cm (65.98\"), " weight 71.8 kg (158 lb 6.4 oz), SpO2 99 %.  Body mass index is 25.58 kg/m².    Appearance: Well nourished female, appropriately dressed, appears stated age and in no acute distress  Gait, Station, Strength: WNL    Mental Status Exam:   Hygiene:   good  Cooperation:  Guarded  Eye Contact:  Good  Psychomotor Behavior:  Restless  Affect:  Appropriate  Mood: normal  Hopelessness: Denies  Speech:  Rambling  Thought Process:  Linear  Thought Content:  Mood congruent  Suicidal:  None  Homicidal:  None  Hallucinations:  None  Delusion:  None  Memory:  Intact  Orientation:  Person, Place, Time and Situation  Reliability:  poor  Insight:  Poor  Judgement:  Impaired  Impulse Control:  Impaired  Physical/Medical Issues:  No      PHQ-Score Total:  PHQ-9 Total Score: 0           Lab Results:   Admission on 10/01/2022, Discharged on 10/05/2022   Component Date Value Ref Range Status   • QT Interval 10/01/2022 422  ms Final   • QTC Interval 10/01/2022 468  ms Final   • Glucose 10/05/2022 80  65 - 99 mg/dL Final   • BUN 10/05/2022 7  6 - 20 mg/dL Final   • Creatinine 10/05/2022 0.83  0.57 - 1.00 mg/dL Final   • Sodium 10/05/2022 140  136 - 145 mmol/L Final   • Potassium 10/05/2022 5.3 (H)  3.5 - 5.2 mmol/L Final    Slight hemolysis detected by analyzer. Results may be affected.   • Chloride 10/05/2022 101  98 - 107 mmol/L Final   • CO2 10/05/2022 29.9 (H)  22.0 - 29.0 mmol/L Final   • Calcium 10/05/2022 9.6  8.6 - 10.5 mg/dL Final   • BUN/Creatinine Ratio 10/05/2022 8.4  7.0 - 25.0 Final   • Anion Gap 10/05/2022 9.1  5.0 - 15.0 mmol/L Final   • eGFR 10/05/2022 92.1  >60.0 mL/min/1.73 Final    National Kidney Foundation and American Society of Nephrology (ASN) Task Force recommended calculation based on the Chronic Kidney Disease Epidemiology Collaboration (CKD-EPI) equation refit without adjustment for race.   Admission on 10/01/2022, Discharged on 10/01/2022   Component Date Value Ref Range Status   • Glucose 10/01/2022 89  65 - 99  mg/dL Final   • BUN 10/01/2022 4 (L)  6 - 20 mg/dL Final   • Creatinine 10/01/2022 0.78  0.57 - 1.00 mg/dL Final   • Sodium 10/01/2022 142  136 - 145 mmol/L Final   • Potassium 10/01/2022 3.2 (L)  3.5 - 5.2 mmol/L Final   • Chloride 10/01/2022 103  98 - 107 mmol/L Final   • CO2 10/01/2022 28.6  22.0 - 29.0 mmol/L Final   • Calcium 10/01/2022 9.9  8.6 - 10.5 mg/dL Final   • Total Protein 10/01/2022 7.6  6.0 - 8.5 g/dL Final   • Albumin 10/01/2022 4.18  3.50 - 5.20 g/dL Final   • ALT (SGPT) 10/01/2022 11  1 - 33 U/L Final   • AST (SGOT) 10/01/2022 14  1 - 32 U/L Final   • Alkaline Phosphatase 10/01/2022 135 (H)  39 - 117 U/L Final   • Total Bilirubin 10/01/2022 0.3  0.0 - 1.2 mg/dL Final   • Globulin 10/01/2022 3.4  gm/dL Final   • A/G Ratio 10/01/2022 1.2  g/dL Final   • BUN/Creatinine Ratio 10/01/2022 5.1 (L)  7.0 - 25.0 Final   • Anion Gap 10/01/2022 10.4  5.0 - 15.0 mmol/L Final   • eGFR 10/01/2022 99.2  >60.0 mL/min/1.73 Final    National Kidney Foundation and American Society of Nephrology (ASN) Task Force recommended calculation based on the Chronic Kidney Disease Epidemiology Collaboration (CKD-EPI) equation refit without adjustment for race.   • HCG, Urine QL 10/01/2022 Negative  Negative Final   • Color, UA 10/01/2022 Yellow  Yellow, Straw Final   • Appearance, UA 10/01/2022 Clear  Clear Final   • pH, UA 10/01/2022 6.5  5.0 - 8.0 Final   • Specific Gravity, UA 10/01/2022 <=1.005  1.005 - 1.030 Final   • Glucose, UA 10/01/2022 Negative  Negative Final   • Ketones, UA 10/01/2022 Negative  Negative Final   • Bilirubin, UA 10/01/2022 Negative  Negative Final   • Blood, UA 10/01/2022 Negative  Negative Final   • Protein, UA 10/01/2022 Negative  Negative Final   • Leuk Esterase, UA 10/01/2022 Trace (A)  Negative Final   • Nitrite, UA 10/01/2022 Negative  Negative Final   • Urobilinogen, UA 10/01/2022 0.2 E.U./dL  0.2 - 1.0 E.U./dL Final   • Ethanol 10/01/2022 <10  0 - 10 mg/dL Final   • Ethanol % 10/01/2022 <0.010   % Final   • THC, Screen, Urine 10/01/2022 Negative  Negative Final   • Phencyclidine (PCP), Urine 10/01/2022 Negative  Negative Final   • Cocaine Screen, Urine 10/01/2022 Negative  Negative Final   • Methamphetamine, Ur 10/01/2022 Positive (A)  Negative Final   • Opiate Screen 10/01/2022 Negative  Negative Final   • Amphetamine Screen, Urine 10/01/2022 Positive (A)  Negative Final   • Benzodiazepine Screen, Urine 10/01/2022 Negative  Negative Final   • Tricyclic Antidepressants Screen 10/01/2022 Negative  Negative Final   • Methadone Screen, Urine 10/01/2022 Negative  Negative Final   • Barbiturates Screen, Urine 10/01/2022 Negative  Negative Final   • Oxycodone Screen, Urine 10/01/2022 Negative  Negative Final   • Propoxyphene Screen 10/01/2022 Negative  Negative Final   • Buprenorphine, Screen, Urine 10/01/2022 Positive (A)  Negative Final   • Magnesium 10/01/2022 2.1  1.6 - 2.6 mg/dL Final   • COVID19 10/01/2022 Not Detected  Not Detected - Ref. Range Final   • Influenza A PCR 10/01/2022 Not Detected  Not Detected Final   • Influenza B PCR 10/01/2022 Not Detected  Not Detected Final   • WBC 10/01/2022 7.46  3.40 - 10.80 10*3/mm3 Final   • RBC 10/01/2022 4.37  3.77 - 5.28 10*6/mm3 Final   • Hemoglobin 10/01/2022 12.6  12.0 - 15.9 g/dL Final   • Hematocrit 10/01/2022 38.5  34.0 - 46.6 % Final   • MCV 10/01/2022 88.1  79.0 - 97.0 fL Final   • MCH 10/01/2022 28.8  26.6 - 33.0 pg Final   • MCHC 10/01/2022 32.7  31.5 - 35.7 g/dL Final   • RDW 10/01/2022 12.5  12.3 - 15.4 % Final   • RDW-SD 10/01/2022 40.6  37.0 - 54.0 fl Final   • MPV 10/01/2022 9.0  6.0 - 12.0 fL Final   • Platelets 10/01/2022 297  140 - 450 10*3/mm3 Final   • Neutrophil % 10/01/2022 58.2  42.7 - 76.0 % Final   • Lymphocyte % 10/01/2022 31.4  19.6 - 45.3 % Final   • Monocyte % 10/01/2022 6.6  5.0 - 12.0 % Final   • Eosinophil % 10/01/2022 3.2  0.3 - 6.2 % Final   • Basophil % 10/01/2022 0.3  0.0 - 1.5 % Final   • Immature Grans % 10/01/2022 0.3  0.0  - 0.5 % Final   • Neutrophils, Absolute 10/01/2022 4.35  1.70 - 7.00 10*3/mm3 Final   • Lymphocytes, Absolute 10/01/2022 2.34  0.70 - 3.10 10*3/mm3 Final   • Monocytes, Absolute 10/01/2022 0.49  0.10 - 0.90 10*3/mm3 Final   • Eosinophils, Absolute 10/01/2022 0.24  0.00 - 0.40 10*3/mm3 Final   • Basophils, Absolute 10/01/2022 0.02  0.00 - 0.20 10*3/mm3 Final   • Immature Grans, Absolute 10/01/2022 0.02  0.00 - 0.05 10*3/mm3 Final   • nRBC 10/01/2022 0.0  0.0 - 0.2 /100 WBC Final   • Extra Tube 10/01/2022 Hold for add-ons.   Final    Auto resulted.   • Extra Tube 10/01/2022 hold for add-on   Final    Auto resulted   • Extra Tube 10/01/2022 Hold for add-ons.   Final    Auto resulted.   • Extra Tube 10/01/2022 Hold for add-ons.   Final    Auto resulted   • RBC, UA 10/01/2022 0-2  None Seen, 0-2 /HPF Final   • WBC, UA 10/01/2022 3-5 (A)  None Seen, 0-2 /HPF Final   • Bacteria, UA 10/01/2022 None Seen  None Seen /HPF Final   • Squamous Epithelial Cells, UA 10/01/2022 7-12 (A)  None Seen, 0-2 /HPF Final   • Hyaline Casts, UA 10/01/2022 0-2  None Seen /LPF Final   • Methodology 10/01/2022 Manual Light Microscopy   Final       Assessment & Plan   Diagnoses and all orders for this visit:    1. Schizoaffective disorder, bipolar type (HCC) (Primary)  -     ARIPiprazole ER (ABILIFY MAINTENA) 400 MG prefilled syringe IM prefilled syringe; Inject 400 mg into the appropriate muscle as directed by prescriber Every 28 (Twenty-Eight) Days. Indications: Schizophrenia  Dispense: 1 each; Refill: 3  -     Discontinue: ARIPiprazole ER (ABILIFY MAINTENA) IM prefilled syringe 400 mg    2. Generalized anxiety disorder  -     mirtazapine (Remeron) 15 MG tablet; Take 1 tablet by mouth Every Night. Indications: Major Depressive Disorder  Dispense: 30 tablet; Refill: 2  -     hydrOXYzine pamoate (VISTARIL) 50 MG capsule; Take 1 capsule by mouth 3 (Three) Times a Day As Needed for Anxiety.  Dispense: 90 capsule; Refill: 2    3. Insomnia due to  mental condition  -     mirtazapine (Remeron) 15 MG tablet; Take 1 tablet by mouth Every Night. Indications: Major Depressive Disorder  Dispense: 30 tablet; Refill: 2    4. Medication management    5. History of substance abuse (HCC)      -Restart aripiprazole  mg injection every 28 days for schizoaffective disorder  -Continue mirtazapine 15 mg nightly for sleep  -Increasee hydroxyzine pamoate 50 mg three times daily as needed for anxiety  -Encouraged patient to begin therapy  -Encouraged patient to attend NA meetings  -Encouraged patient to remain compliant with medications   -The benefits of a healthy diet and exercise were discussed with patient, especially the positive effects they have on mental health. Patient encouraged to consider lifestyle modification regarding  diet and exercise patterns to maximize results of mental health treatment.  -Reviewed previous available documentation  -Reviewed most recent available labs   -Cintia Issa  reports that she has been smoking cigarettes. She has a 8.00 pack-year smoking history. She has never used smokeless tobacco.. I have educated her on the risk of diseases from using tobacco products such as cancer, COPD, heart disease and cataracts. I advised her to quit and she is not willing to quit.   I spent 3  minutes counseling the patient.               Visit Diagnoses:    ICD-10-CM ICD-9-CM   1. Schizoaffective disorder, bipolar type (HCC)  F25.0 295.70   2. Generalized anxiety disorder  F41.1 300.02   3. Insomnia due to mental condition  F51.05 300.9     327.02   4. Medication management  Z79.899 V58.69   5. History of substance abuse (HCC)  F19.11 305.93         TREATMENT PLAN/GOALS: Continue supportive psychotherapy efforts and medications as indicated. Treatment and medication options discussed during today's visit. Patient acknowledged and verbally consented to continue with current treatment plan and was educated on the importance of compliance with  treatment and follow-up appointments.    MEDICATION ISSUES:    Discussed medication options and treatment plan of prescribed medication as well as the risks, benefits, and side effects including potential falls, possible impaired driving and metabolic adversities among others. Patient is agreeable to call the office with any worsening of symptoms or onset of side effects. Patient is agreeable to call 911 or go to the nearest ER should he/she begin having SI/HI.     MEDS ORDERED DURING VISIT:  New Medications Ordered This Visit   Medications   • ARIPiprazole ER (ABILIFY MAINTENA) 400 MG prefilled syringe IM prefilled syringe     Sig: Inject 400 mg into the appropriate muscle as directed by prescriber Every 28 (Twenty-Eight) Days. Indications: Schizophrenia     Dispense:  1 each     Refill:  3   • mirtazapine (Remeron) 15 MG tablet     Sig: Take 1 tablet by mouth Every Night. Indications: Major Depressive Disorder     Dispense:  30 tablet     Refill:  2   • hydrOXYzine pamoate (VISTARIL) 50 MG capsule     Sig: Take 1 capsule by mouth 3 (Three) Times a Day As Needed for Anxiety.     Dispense:  90 capsule     Refill:  2       Return in about 4 weeks (around 11/23/2022), or if symptoms worsen or fail to improve.         Prognosis: Guarded dependent on medication/follow up and treatment plan compliance.  Functionality: pt showing improvements in important areas of daily functioning.     Short-term goals: Patient will adhere to medication regimen and note continued improvement in symptoms over the next 3 months.   Long-term goals: Patient will be adherent to medication management and psychotherapy with continued improvement in symptoms over the next 6 months          This document has been electronically signed by ROBIN Logan   October 26, 2022 14:59 EDT    Part of this note may be an electronic transcription/translation of spoken language to printed text using the Dragon Dictation System.

## 2022-11-28 ENCOUNTER — TELEPHONE (OUTPATIENT)
Dept: FAMILY MEDICINE CLINIC | Facility: CLINIC | Age: 37
End: 2022-11-28

## 2022-11-28 NOTE — TELEPHONE ENCOUNTER
ARIES NO SHOWED AT HER LAST APPT BUT HAS IT R/S FOR 1/4/22. SHE WANTS TO KNOW IF SHE CAN STILL GO  HER ABILIFY AND COME BY FOR THE NURSES TO GIVE HER THE SHOT.

## 2023-01-04 ENCOUNTER — OFFICE VISIT (OUTPATIENT)
Dept: PSYCHIATRY | Facility: CLINIC | Age: 38
End: 2023-01-04

## 2023-01-04 VITALS
OXYGEN SATURATION: 97 % | TEMPERATURE: 97.1 F | HEIGHT: 66 IN | BODY MASS INDEX: 25.65 KG/M2 | HEART RATE: 108 BPM | DIASTOLIC BLOOD PRESSURE: 94 MMHG | SYSTOLIC BLOOD PRESSURE: 132 MMHG | WEIGHT: 159.6 LBS

## 2023-01-04 DIAGNOSIS — F19.11 HISTORY OF SUBSTANCE ABUSE: ICD-10-CM

## 2023-01-04 DIAGNOSIS — F25.0 SCHIZOAFFECTIVE DISORDER, BIPOLAR TYPE: Primary | ICD-10-CM

## 2023-01-04 DIAGNOSIS — Z79.899 MEDICATION MANAGEMENT: ICD-10-CM

## 2023-01-04 DIAGNOSIS — F41.1 GENERALIZED ANXIETY DISORDER: ICD-10-CM

## 2023-01-04 DIAGNOSIS — F51.05 INSOMNIA DUE TO MENTAL CONDITION: ICD-10-CM

## 2023-01-04 PROCEDURE — 99214 OFFICE O/P EST MOD 30 MIN: CPT | Performed by: NURSE PRACTITIONER

## 2023-01-04 RX ORDER — MIRTAZAPINE 15 MG/1
15 TABLET, FILM COATED ORAL NIGHTLY
Qty: 30 TABLET | Refills: 2 | Status: SHIPPED | OUTPATIENT
Start: 2023-01-04

## 2023-01-04 RX ORDER — HYDROXYZINE PAMOATE 50 MG/1
50 CAPSULE ORAL 3 TIMES DAILY PRN
Qty: 90 CAPSULE | Refills: 2 | Status: SHIPPED | OUTPATIENT
Start: 2023-01-04

## 2023-01-04 NOTE — PROGRESS NOTES
Subjective   Cintia Issa is a 37 y.o. female who presents today for follow up    Chief Complaint:  Schizoaffective    History of Present Illness: Patient presents as follow up. Reports she is a few days behind with her injection. States she has not spoken with her pharmacy concerning her ability to receive the injection there. States her moods have been level, she denies any paranoia. She is more linear in her conversations today. She denies any relapses. Reports anxiety and depression is well controlled, she denies any side effects. She reports sleep is good, denies nightmares, averaging 5-6 hours per night. Reports appetite is good, no significant weight changes noted. She denies SI/HI/AVH.     The following portions of the patient's history were reviewed and updated as appropriate: allergies, current medications, past family history, past medical history, past social history, past surgical history and problem list.      Past Medical History:  Past Medical History:   Diagnosis Date   • Anxiety    • Arthritis    • Bronchitis    • Chronic back pain    • Fibromyalgia    • Hypertension    • MDD (major depressive disorder), recurrent, severe, with psychosis (HCC) 06/09/2018   • Panic disorder    • Psychiatric illness    • Schizoaffective disorder (HCC)    • Schizophrenia, paranoid (HCC)        Social History:  Social History     Socioeconomic History   • Marital status:    • Number of children: 0   • Highest education level: High school graduate   Tobacco Use   • Smoking status: Every Day     Packs/day: 1.00     Years: 8.00     Pack years: 8.00     Types: Cigarettes   • Smokeless tobacco: Never   Vaping Use   • Vaping Use: Never used   Substance and Sexual Activity   • Alcohol use: No   • Drug use: No     Comment: denies   • Sexual activity: Yes     Partners: Male     Comment: reports she hasn't been in \"forever.\"        Family History:  Family History   Problem Relation Age of Onset   • Arthritis  Mother    • Stroke Father    • COPD Brother    • Diabetes Maternal Grandmother    • COPD Maternal Grandfather    • Heart disease Maternal Grandfather    • Stroke Maternal Grandfather    • Anxiety disorder Neg Hx    • Depression Neg Hx    • Schizophrenia Neg Hx        Past Surgical History:  Past Surgical History:   Procedure Laterality Date   • WISDOM TOOTH EXTRACTION         Problem List:  Patient Active Problem List   Diagnosis   • Fibromyalgia   • Essential hypertension   • Schizoaffective disorder (HCC)   • Major depressive disorder, recurrent (HCC)   • Paranoid behavior (HCC)   • Psychosis (HCC)   • Opioid use disorder, severe, on maintenance therapy (HCC)       Allergy:   Allergies   Allergen Reactions   • Elavil [Amitriptyline] Nausea Only        Current Medications:   Current Outpatient Medications   Medication Sig Dispense Refill   • ARIPiprazole ER (ABILIFY MAINTENA) 400 MG prefilled syringe IM prefilled syringe Inject 400 mg into the appropriate muscle as directed by prescriber Every 28 (Twenty-Eight) Days. Indications: Schizophrenia 1 each 3   • buprenorphine-naloxone (SUBOXONE) 8-2 MG per SL tablet Place 1 tablet under the tongue Daily.     • hydrOXYzine pamoate (VISTARIL) 50 MG capsule Take 1 capsule by mouth 3 (Three) Times a Day As Needed for Anxiety. 90 capsule 2   • mirtazapine (Remeron) 15 MG tablet Take 1 tablet by mouth Every Night. Indications: Major Depressive Disorder 30 tablet 2     Current Facility-Administered Medications   Medication Dose Route Frequency Provider Last Rate Last Admin   • ARIPiprazole ER (ABILIFY MAINTENA) IM prefilled syringe 400 mg  400 mg Intramuscular Q28 Days Francia Grace APRN   400 mg at 01/04/23 1018       Review of Symptoms:    Review of Systems   Constitutional: Negative.    HENT: Negative.    Eyes: Negative.    Respiratory: Negative.    Cardiovascular: Negative.    Gastrointestinal: Negative.    Genitourinary: Negative.    Musculoskeletal: Negative.     Skin: Negative.    Neurological: Positive for memory problem and confusion.   Psychiatric/Behavioral: Positive for depressed mood. Negative for suicidal ideas. The patient is nervous/anxious.        Objective   Physical Exam:   Blood pressure 132/94, pulse 108, temperature 97.1 °F (36.2 °C), temperature source Temporal, height 167.6 cm (65.98\"), weight 72.4 kg (159 lb 9.6 oz), SpO2 97 %.  Body mass index is 25.77 kg/m².    Appearance: Well nourished female, appropriately dressed, appears stated age and in no acute distress  Gait, Station, Strength: WNL    Mental Status Exam:   Hygiene:   good  Cooperation:  Cooperative  Eye Contact:  Good  Psychomotor Behavior:  Appropriate  Affect:  Appropriate  Mood: normal  Hopelessness: Denies  Speech:  Rapid  Thought Process:  Linear  Thought Content:  Mood congruent  Suicidal:  None  Homicidal:  None  Hallucinations:  None  Delusion:  None  Memory:  Deficits  Orientation:  Person, Place, Time and Situation  Reliability:  fair  Insight:  Poor  Judgement:  Fair and Impaired  Impulse Control:  Poor and Impaired  Physical/Medical Issues:  No      PHQ-Score Total:  PHQ-9 Total Score: 0           Lab Results:   No visits with results within 1 Month(s) from this visit.   Latest known visit with results is:   Admission on 10/01/2022, Discharged on 10/05/2022   Component Date Value Ref Range Status   • QT Interval 10/01/2022 422  ms Final   • QTC Interval 10/01/2022 468  ms Final   • Glucose 10/05/2022 80  65 - 99 mg/dL Final   • BUN 10/05/2022 7  6 - 20 mg/dL Final   • Creatinine 10/05/2022 0.83  0.57 - 1.00 mg/dL Final   • Sodium 10/05/2022 140  136 - 145 mmol/L Final   • Potassium 10/05/2022 5.3 (H)  3.5 - 5.2 mmol/L Final    Slight hemolysis detected by analyzer. Results may be affected.   • Chloride 10/05/2022 101  98 - 107 mmol/L Final   • CO2 10/05/2022 29.9 (H)  22.0 - 29.0 mmol/L Final   • Calcium 10/05/2022 9.6  8.6 - 10.5 mg/dL Final   • BUN/Creatinine Ratio 10/05/2022 8.4   7.0 - 25.0 Final   • Anion Gap 10/05/2022 9.1  5.0 - 15.0 mmol/L Final   • eGFR 10/05/2022 92.1  >60.0 mL/min/1.73 Final    National Kidney Foundation and American Society of Nephrology (ASN) Task Force recommended calculation based on the Chronic Kidney Disease Epidemiology Collaboration (CKD-EPI) equation refit without adjustment for race.       Assessment & Plan   Diagnoses and all orders for this visit:    1. Schizoaffective disorder, bipolar type (HCC) (Primary)  -     ARIPiprazole ER (ABILIFY MAINTENA) 400 MG prefilled syringe IM prefilled syringe; Inject 400 mg into the appropriate muscle as directed by prescriber Every 28 (Twenty-Eight) Days. Indications: Schizophrenia  Dispense: 1 each; Refill: 3  -     ARIPiprazole ER (ABILIFY MAINTENA) IM prefilled syringe 400 mg    2. Generalized anxiety disorder  -     hydrOXYzine pamoate (VISTARIL) 50 MG capsule; Take 1 capsule by mouth 3 (Three) Times a Day As Needed for Anxiety.  Dispense: 90 capsule; Refill: 2  -     mirtazapine (Remeron) 15 MG tablet; Take 1 tablet by mouth Every Night. Indications: Major Depressive Disorder  Dispense: 30 tablet; Refill: 2    3. Insomnia due to mental condition  -     mirtazapine (Remeron) 15 MG tablet; Take 1 tablet by mouth Every Night. Indications: Major Depressive Disorder  Dispense: 30 tablet; Refill: 2    4. Medication management    5. History of substance abuse (HCC)        -Continue aripiprazole  mg injection every 28 days for schizoaffective disorder  -Continue mirtazapine 15 mg nightly for sleep  -Continue hydroxyzine pamoate 50 mg three times daily as needed for anxiety  -Encouraged patient to begin therapy  -Encouraged patient to attend NA meetings  -Encouraged patient to remain compliant with medications   -The benefits of a healthy diet and exercise were discussed with patient, especially the positive effects they have on mental health. Patient encouraged to consider lifestyle modification regarding  diet and  exercise patterns to maximize results of mental health treatment.  -Reviewed previous available documentation  -Reviewed most recent available labs   -Cintia Issa  reports that she has been smoking cigarettes. She has a 8.00 pack-year smoking history. She has never used smokeless tobacco.. I have educated her on the risk of diseases from using tobacco products such as cancer, COPD, heart disease and cataracts. I advised her to quit and she is not willing to quit. I spent 3  minutes counseling the patient.               Visit Diagnoses:    ICD-10-CM ICD-9-CM   1. Schizoaffective disorder, bipolar type (HCC)  F25.0 295.70   2. Generalized anxiety disorder  F41.1 300.02   3. Insomnia due to mental condition  F51.05 300.9     327.02   4. Medication management  Z79.899 V58.69   5. History of substance abuse ([facility])  F19.11 305.93         TREATMENT PLAN/GOALS: Continue supportive psychotherapy efforts and medications as indicated. Treatment and medication options discussed during today's visit. Patient acknowledged and verbally consented to continue with current treatment plan and was educated on the importance of compliance with treatment and follow-up appointments.    MEDICATION ISSUES:    Discussed medication options and treatment plan of prescribed medication as well as the risks, benefits, and side effects including potential falls, possible impaired driving and metabolic adversities among others. Patient is agreeable to call the office with any worsening of symptoms or onset of side effects. Patient is agreeable to call 911 or go to the nearest ER should he/she begin having SI/HI.     MEDS ORDERED DURING VISIT:  New Medications Ordered This Visit   Medications   • hydrOXYzine pamoate (VISTARIL) 50 MG capsule     Sig: Take 1 capsule by mouth 3 (Three) Times a Day As Needed for Anxiety.     Dispense:  90 capsule     Refill:  2   • ARIPiprazole ER (ABILIFY MAINTENA) 400 MG prefilled syringe IM prefilled syringe      Sig: Inject 400 mg into the appropriate muscle as directed by prescriber Every 28 (Twenty-Eight) Days. Indications: Schizophrenia     Dispense:  1 each     Refill:  3   • mirtazapine (Remeron) 15 MG tablet     Sig: Take 1 tablet by mouth Every Night. Indications: Major Depressive Disorder     Dispense:  30 tablet     Refill:  2   • ARIPiprazole ER (ABILIFY MAINTENA) IM prefilled syringe 400 mg       Return in about 3 months (around 4/4/2023), or if symptoms worsen or fail to improve.         Prognosis: Guarded dependent on medication/follow up and treatment plan compliance.  Functionality: pt showing improvements in important areas of daily functioning.     Short-term goals: Patient will adhere to medication regimen and note continued improvement in symptoms over the next 3 months.   Long-term goals: Patient will be adherent to medication management and psychotherapy with continued improvement in symptoms over the next 6 months          This document has been electronically signed by ROBIN Logan   January 4, 2023 11:03 EST    Part of this note may be an electronic transcription/translation of spoken language to printed text using the Dragon Dictation System.

## 2023-01-04 NOTE — PROGRESS NOTES
Injection  Injection performed in RIGHT VENTROGLUTEAL by Shelley Webster RN. Patient tolerated the procedure well without complications.  01/04/23   Shelley Webster RN

## 2023-02-22 ENCOUNTER — CLINICAL SUPPORT (OUTPATIENT)
Dept: FAMILY MEDICINE CLINIC | Facility: CLINIC | Age: 38
End: 2023-02-22

## 2023-02-22 DIAGNOSIS — F25.0 SCHIZOAFFECTIVE DISORDER, BIPOLAR TYPE: ICD-10-CM

## 2023-02-22 DIAGNOSIS — F33.9 RECURRENT MAJOR DEPRESSIVE DISORDER, REMISSION STATUS UNSPECIFIED: ICD-10-CM

## 2023-02-22 PROCEDURE — 96372 THER/PROPH/DIAG INJ SC/IM: CPT | Performed by: NURSE PRACTITIONER

## 2023-02-22 NOTE — PROGRESS NOTES
Injection  Injection performed in Left Ventroglueteal by Mariah Kay MA. Patient tolerated the procedure well without complications.  02/22/23   Mariah Kay MA

## 2023-04-12 ENCOUNTER — OFFICE VISIT (OUTPATIENT)
Dept: PSYCHIATRY | Facility: CLINIC | Age: 38
End: 2023-04-12

## 2023-04-12 VITALS
HEIGHT: 66 IN | SYSTOLIC BLOOD PRESSURE: 124 MMHG | OXYGEN SATURATION: 100 % | DIASTOLIC BLOOD PRESSURE: 88 MMHG | HEART RATE: 107 BPM | WEIGHT: 165.2 LBS | TEMPERATURE: 96.9 F | BODY MASS INDEX: 26.55 KG/M2

## 2023-04-12 DIAGNOSIS — F25.0 SCHIZOAFFECTIVE DISORDER, BIPOLAR TYPE: ICD-10-CM

## 2023-04-12 DIAGNOSIS — Z79.899 MEDICATION MANAGEMENT: ICD-10-CM

## 2023-04-12 DIAGNOSIS — F51.05 INSOMNIA DUE TO MENTAL CONDITION: ICD-10-CM

## 2023-04-12 DIAGNOSIS — F19.11 HISTORY OF SUBSTANCE ABUSE: ICD-10-CM

## 2023-04-12 DIAGNOSIS — F41.1 GENERALIZED ANXIETY DISORDER: Primary | ICD-10-CM

## 2023-04-12 PROCEDURE — 99214 OFFICE O/P EST MOD 30 MIN: CPT | Performed by: NURSE PRACTITIONER

## 2023-04-12 RX ORDER — MIRTAZAPINE 15 MG/1
15 TABLET, FILM COATED ORAL NIGHTLY
Qty: 30 TABLET | Refills: 3 | Status: SHIPPED | OUTPATIENT
Start: 2023-04-12

## 2023-04-12 NOTE — PROGRESS NOTES
Injection  Injection performed in Left Ventroglueteal by Mariah Kay MA. Patient tolerated the procedure well without complications.  04/12/23   Mariah Kay MA

## 2023-04-12 NOTE — PROGRESS NOTES
"Subjective   Cintia Issa is a 37 y.o. female who presents today for follow up    Chief Complaint:  Schizoaffective    History of Present Illness: Patient presents as follow up. She appears to be more clear and able to navigate and control conversations today. States moods have been good, denies any paranoia or delusions. She denies any relapses. States she feels she sleeps \"too much\", averaging 8 hours per night. Reports appetite is good, per chart review she has gained 6 pounds since last visit. She denies SI/HI/AVH.    The following portions of the patient's history were reviewed and updated as appropriate: allergies, current medications, past family history, past medical history, past social history, past surgical history and problem list.      Past Medical History:  Past Medical History:   Diagnosis Date   • Anxiety    • Arthritis    • Bronchitis    • Chronic back pain    • Fibromyalgia    • Hypertension    • MDD (major depressive disorder), recurrent, severe, with psychosis 06/09/2018   • Panic disorder    • Psychiatric illness    • Schizoaffective disorder    • Schizophrenia, paranoid        Social History:  Social History     Socioeconomic History   • Marital status: Single   • Number of children: 0   • Highest education level: High school graduate   Tobacco Use   • Smoking status: Every Day     Packs/day: 1.00     Years: 8.00     Pack years: 8.00     Types: Cigarettes   • Smokeless tobacco: Never   Vaping Use   • Vaping Use: Never used   Substance and Sexual Activity   • Alcohol use: No   • Drug use: No     Comment: denies   • Sexual activity: Yes     Partners: Male     Comment: reports she hasn't been in \"forever.\"        Family History:  Family History   Problem Relation Age of Onset   • Arthritis Mother    • Stroke Father    • COPD Brother    • Diabetes Maternal Grandmother    • COPD Maternal Grandfather    • Heart disease Maternal Grandfather    • Stroke Maternal Grandfather    • Anxiety " disorder Neg Hx    • Depression Neg Hx    • Schizophrenia Neg Hx        Past Surgical History:  Past Surgical History:   Procedure Laterality Date   • WISDOM TOOTH EXTRACTION         Problem List:  Patient Active Problem List   Diagnosis   • Fibromyalgia   • Essential hypertension   • Schizoaffective disorder   • Major depressive disorder, recurrent   • Paranoid behavior   • Psychosis   • Opioid use disorder, severe, on maintenance therapy       Allergy:   Allergies   Allergen Reactions   • Elavil [Amitriptyline] Nausea Only        Current Medications:   Current Outpatient Medications   Medication Sig Dispense Refill   • ARIPiprazole ER (ABILIFY MAINTENA) 400 MG prefilled syringe IM prefilled syringe Inject 400 mg into the appropriate muscle as directed by prescriber Every 28 (Twenty-Eight) Days. Indications: Schizophrenia 1 each 5   • buprenorphine-naloxone (SUBOXONE) 8-2 MG per SL tablet Place 1 tablet under the tongue Daily. Indications: Opioid Use Disorder (Continuation of Therapy)     • hydrOXYzine pamoate (VISTARIL) 50 MG capsule Take 1 capsule by mouth 3 (Three) Times a Day As Needed for Anxiety. 90 capsule 2   • mirtazapine (Remeron) 15 MG tablet Take 1 tablet by mouth Every Night. Indications: Major Depressive Disorder 30 tablet 3     Current Facility-Administered Medications   Medication Dose Route Frequency Provider Last Rate Last Admin   • ARIPiprazole ER (ABILIFY MAINTENA) IM prefilled syringe 400 mg  400 mg Intramuscular Q28 Days Francia Grace APRN   400 mg at 04/12/23 1417       Review of Symptoms:    Review of Systems   Constitutional: Negative.    HENT: Negative.    Eyes: Negative.    Respiratory: Negative.    Cardiovascular: Negative.    Gastrointestinal: Negative.    Musculoskeletal: Negative.    Skin: Negative.    Neurological: Negative.    Psychiatric/Behavioral: Positive for depressed mood. Negative for suicidal ideas. The patient is nervous/anxious.        Objective   Physical  "Exam:   Blood pressure 124/88, pulse 107, temperature 96.9 °F (36.1 °C), temperature source Temporal, height 167.6 cm (65.98\"), weight 74.9 kg (165 lb 3.2 oz), SpO2 100 %.  Body mass index is 26.68 kg/m².    Appearance: Well nourished female, appropriately dressed, appears stated age and in no acute distress  Gait, Station, Strength: WNL    Mental Status Exam:   Hygiene:   good  Cooperation:  Cooperative  Eye Contact:  Good  Psychomotor Behavior:  Appropriate  Affect:  Appropriate  Mood: normal  Hopelessness: Denies  Speech:  Normal  Thought Process:  Linear  Thought Content:  Mood congruent  Suicidal:  None  Homicidal:  None  Hallucinations:  None  Delusion:  None  Memory:  Intact  Orientation:  Person, Place, Time and Situation  Reliability:  fair  Insight:  Fair  Judgement:  Fair  Impulse Control:  Fair  Physical/Medical Issues:  No      PHQ-Score Total:  PHQ-9 Total Score: 2           Lab Results:   No visits with results within 1 Month(s) from this visit.   Latest known visit with results is:   Admission on 10/01/2022, Discharged on 10/05/2022   Component Date Value Ref Range Status   • QT Interval 10/01/2022 422  ms Final   • QTC Interval 10/01/2022 468  ms Final   • Glucose 10/05/2022 80  65 - 99 mg/dL Final   • BUN 10/05/2022 7  6 - 20 mg/dL Final   • Creatinine 10/05/2022 0.83  0.57 - 1.00 mg/dL Final   • Sodium 10/05/2022 140  136 - 145 mmol/L Final   • Potassium 10/05/2022 5.3 (H)  3.5 - 5.2 mmol/L Final    Slight hemolysis detected by analyzer. Results may be affected.   • Chloride 10/05/2022 101  98 - 107 mmol/L Final   • CO2 10/05/2022 29.9 (H)  22.0 - 29.0 mmol/L Final   • Calcium 10/05/2022 9.6  8.6 - 10.5 mg/dL Final   • BUN/Creatinine Ratio 10/05/2022 8.4  7.0 - 25.0 Final   • Anion Gap 10/05/2022 9.1  5.0 - 15.0 mmol/L Final   • eGFR 10/05/2022 92.1  >60.0 mL/min/1.73 Final    National Kidney Foundation and American Society of Nephrology (ASN) Task Force recommended calculation based on the " Chronic Kidney Disease Epidemiology Collaboration (CKD-EPI) equation refit without adjustment for race.       Assessment & Plan   Diagnoses and all orders for this visit:    1. Generalized anxiety disorder (Primary)  -     mirtazapine (Remeron) 15 MG tablet; Take 1 tablet by mouth Every Night. Indications: Major Depressive Disorder  Dispense: 30 tablet; Refill: 3    2. Schizoaffective disorder, bipolar type  -     ARIPiprazole ER (ABILIFY MAINTENA) 400 MG prefilled syringe IM prefilled syringe; Inject 400 mg into the appropriate muscle as directed by prescriber Every 28 (Twenty-Eight) Days. Indications: Schizophrenia  Dispense: 1 each; Refill: 5    3. Medication management    4. Insomnia due to mental condition  -     mirtazapine (Remeron) 15 MG tablet; Take 1 tablet by mouth Every Night. Indications: Major Depressive Disorder  Dispense: 30 tablet; Refill: 3    5. History of substance abuse        -Continue aripiprazole  mg injection every 28 days for schizoaffective disorder  -Continue mirtazapine 15 mg nightly for sleep  -Continue hydroxyzine pamoate 50 mg three times daily as needed for anxiety  -Encouraged patient to begin therapy  -Encouraged patient to attend NA meetings  -Encouraged patient to remain compliant with medications   -LUIS reviewed and appropriate. Patient counseled on use of controlled substances.   -The benefits of a healthy diet and exercise were discussed with patient, especially the positive effects they have on mental health. Patient encouraged to consider lifestyle modification regarding  diet and exercise patterns to maximize results of mental health treatment.  -Reviewed previous available documentation  -Reviewed most recent available labs   -Cintia Issa  reports that she has been smoking cigarettes. She has a 8.00 pack-year smoking history. She has never used smokeless tobacco.. I have educated her on the risk of diseases from using tobacco products such as cancer,  COPD, heart disease and cataracts. I advised her to quit and she is not willing to quit. I spent 3  minutes counseling the patient.               Visit Diagnoses:    ICD-10-CM ICD-9-CM   1. Generalized anxiety disorder  F41.1 300.02   2. Schizoaffective disorder, bipolar type  F25.0 295.70   3. Medication management  Z79.899 V58.69   4. Insomnia due to mental condition  F51.05 300.9     327.02   5. History of substance abuse  F19.11 305.93         TREATMENT PLAN/GOALS: Continue supportive psychotherapy efforts and medications as indicated. Treatment and medication options discussed during today's visit. Patient acknowledged and verbally consented to continue with current treatment plan and was educated on the importance of compliance with treatment and follow-up appointments.    MEDICATION ISSUES:    Discussed medication options and treatment plan of prescribed medication as well as the risks, benefits, and side effects including potential falls, possible impaired driving and metabolic adversities among others. Patient is agreeable to call the office with any worsening of symptoms or onset of side effects. Patient is agreeable to call 911 or go to the nearest ER should he/she begin having SI/HI.     MEDS ORDERED DURING VISIT:  New Medications Ordered This Visit   Medications   • mirtazapine (Remeron) 15 MG tablet     Sig: Take 1 tablet by mouth Every Night. Indications: Major Depressive Disorder     Dispense:  30 tablet     Refill:  3   • ARIPiprazole ER (ABILIFY MAINTENA) 400 MG prefilled syringe IM prefilled syringe     Sig: Inject 400 mg into the appropriate muscle as directed by prescriber Every 28 (Twenty-Eight) Days. Indications: Schizophrenia     Dispense:  1 each     Refill:  5       Return in about 6 months (around 10/12/2023), or if symptoms worsen or fail to improve.         Prognosis: Guarded dependent on medication/follow up and treatment plan compliance.  Functionality: pt showing improvements in  important areas of daily functioning.     Short-term goals: Patient will adhere to medication regimen and note continued improvement in symptoms over the next 3 months.   Long-term goals: Patient will be adherent to medication management and psychotherapy with continued improvement in symptoms over the next 6 months          This document has been electronically signed by ROBIN Logan   April 12, 2023 15:35 EDT    Part of this note may be an electronic transcription/translation of spoken language to printed text using the Dragon Dictation System.

## 2023-05-31 ENCOUNTER — CLINICAL SUPPORT (OUTPATIENT)
Dept: FAMILY MEDICINE CLINIC | Facility: CLINIC | Age: 38
End: 2023-05-31

## 2023-05-31 DIAGNOSIS — F25.0 SCHIZOAFFECTIVE DISORDER, BIPOLAR TYPE: ICD-10-CM

## 2023-05-31 NOTE — PROGRESS NOTES
Injection  Injection performed in Left Ventrogluteal by Mariah Kay MA. Patient tolerated the procedure well without complications.  05/31/23   Mariah Kay MA

## 2023-07-27 DIAGNOSIS — F25.0 SCHIZOAFFECTIVE DISORDER, BIPOLAR TYPE: ICD-10-CM

## 2023-08-03 ENCOUNTER — CLINICAL SUPPORT (OUTPATIENT)
Dept: FAMILY MEDICINE CLINIC | Facility: CLINIC | Age: 38
End: 2023-08-03

## 2023-08-03 DIAGNOSIS — F25.9 SCHIZOAFFECTIVE DISORDER, UNSPECIFIED TYPE: Primary | ICD-10-CM

## 2023-09-07 ENCOUNTER — CLINICAL SUPPORT (OUTPATIENT)
Dept: FAMILY MEDICINE CLINIC | Facility: CLINIC | Age: 38
End: 2023-09-07

## 2023-09-07 DIAGNOSIS — F33.9 RECURRENT MAJOR DEPRESSIVE DISORDER, REMISSION STATUS UNSPECIFIED: Primary | ICD-10-CM

## 2023-09-07 NOTE — PROGRESS NOTES
Injection  Injection performed in Right Ventroglueteal by Lorin Denis MA. Patient tolerated the procedure well without complications.  09/07/23   Mariah Kay MA

## 2023-09-19 NOTE — PLAN OF CARE
Problem: Patient Care Overview  Goal: Plan of Care Review  Outcome: Ongoing (interventions implemented as appropriate)   11/15/18 0315   Coping/Psychosocial   Plan of Care Reviewed With patient   Coping/Psychosocial   Patient Agreement with Plan of Care agrees   Plan of Care Review   Progress no change       Problem: Overarching Goals (Adult)  Goal: Adheres to Safety Considerations for Self and Others  Outcome: Ongoing (interventions implemented as appropriate)    Goal: Optimized Coping Skills in Response to Life Stressors  Outcome: Ongoing (interventions implemented as appropriate)    Goal: Develops/Participates in Therapeutic [place] to Support Successful Transition  Outcome: Ongoing (interventions implemented as appropriate)         V-Y Plasty Text: The defect edges were debeveled with a #15 scalpel blade.  Given the location of the defect, shape of the defect and the proximity to free margins an V-Y advancement flap was deemed most appropriate.  Using a sterile surgical marker, an appropriate advancement flap was drawn incorporating the defect and placing the expected incisions within the relaxed skin tension lines where possible.    The area thus outlined was incised deep to adipose tissue with a #15 scalpel blade.  The skin margins were undermined to an appropriate distance in all directions utilizing iris scissors.

## 2023-10-23 ENCOUNTER — CLINICAL SUPPORT (OUTPATIENT)
Dept: FAMILY MEDICINE CLINIC | Facility: CLINIC | Age: 38
End: 2023-10-23

## 2023-10-23 DIAGNOSIS — F25.0 SCHIZOAFFECTIVE DISORDER, BIPOLAR TYPE: Primary | ICD-10-CM

## 2023-10-23 NOTE — PROGRESS NOTES
Injection  Injection performed in Left Ventrogluteal by Lizette Sullivan MA. Patient tolerated the procedure well without complications.  10/23/23   Lizette Sullivan MA

## 2023-11-29 ENCOUNTER — OFFICE VISIT (OUTPATIENT)
Dept: PSYCHIATRY | Facility: CLINIC | Age: 38
End: 2023-11-29

## 2023-11-29 VITALS
HEART RATE: 95 BPM | TEMPERATURE: 97.6 F | HEIGHT: 66 IN | BODY MASS INDEX: 29.28 KG/M2 | DIASTOLIC BLOOD PRESSURE: 88 MMHG | OXYGEN SATURATION: 98 % | WEIGHT: 182.2 LBS | SYSTOLIC BLOOD PRESSURE: 132 MMHG

## 2023-11-29 DIAGNOSIS — F41.1 GENERALIZED ANXIETY DISORDER: ICD-10-CM

## 2023-11-29 DIAGNOSIS — F51.05 INSOMNIA DUE TO MENTAL CONDITION: ICD-10-CM

## 2023-11-29 DIAGNOSIS — Z79.899 MEDICATION MANAGEMENT: ICD-10-CM

## 2023-11-29 DIAGNOSIS — F19.11 HISTORY OF SUBSTANCE ABUSE: ICD-10-CM

## 2023-11-29 DIAGNOSIS — F25.0 SCHIZOAFFECTIVE DISORDER, BIPOLAR TYPE: Primary | ICD-10-CM

## 2023-11-29 PROCEDURE — 99214 OFFICE O/P EST MOD 30 MIN: CPT | Performed by: NURSE PRACTITIONER

## 2023-11-29 RX ORDER — MIRTAZAPINE 15 MG/1
15 TABLET, FILM COATED ORAL NIGHTLY
Qty: 30 TABLET | Refills: 2 | Status: SHIPPED | OUTPATIENT
Start: 2023-11-29

## 2023-11-29 RX ORDER — HYDROXYZINE PAMOATE 50 MG/1
50 CAPSULE ORAL 3 TIMES DAILY PRN
Qty: 90 CAPSULE | Refills: 2 | Status: SHIPPED | OUTPATIENT
Start: 2023-11-29

## 2023-11-29 NOTE — PROGRESS NOTES
"Subjective   Cintia Issa is a 38 y.o. female who presents today for follow up    Chief Complaint:  Schizoaffective disorder    History of Present Illness: Patient presents as follow up. Reports medications are working well. Denies any AVH or delusions. She has been compliant with monthly injections. Reports sleep is good with medication, states at times she only half tablet of mirtazapine which works well. Reports appetite is good, per chart review she has gained 17 pounds since April. She denies SI/HI/AVH.    The following portions of the patient's history were reviewed and updated as appropriate: allergies, current medications, past family history, past medical history, past social history, past surgical history and problem list.      Past Medical History:  Past Medical History:   Diagnosis Date    Anxiety     Arthritis     Bronchitis     Chronic back pain     Fibromyalgia     Hypertension     MDD (major depressive disorder), recurrent, severe, with psychosis 06/09/2018    Panic disorder     Psychiatric illness     Schizoaffective disorder     Schizophrenia, paranoid        Social History:  Social History     Socioeconomic History    Marital status: Single    Number of children: 0    Highest education level: High school graduate   Tobacco Use    Smoking status: Every Day     Packs/day: 1.00     Years: 8.00     Additional pack years: 0.00     Total pack years: 8.00     Types: Cigarettes    Smokeless tobacco: Never   Vaping Use    Vaping Use: Never used   Substance and Sexual Activity    Alcohol use: No    Drug use: No     Comment: denies    Sexual activity: Yes     Partners: Male     Comment: reports she hasn't been in \"forever.\"        Family History:  Family History   Problem Relation Age of Onset    Arthritis Mother     Stroke Father     COPD Brother     Diabetes Maternal Grandmother     COPD Maternal Grandfather     Heart disease Maternal Grandfather     Stroke Maternal Grandfather     Anxiety disorder " Neg Hx     Depression Neg Hx     Schizophrenia Neg Hx        Past Surgical History:  Past Surgical History:   Procedure Laterality Date    WISDOM TOOTH EXTRACTION         Problem List:  Patient Active Problem List   Diagnosis    Fibromyalgia    Essential hypertension    Schizoaffective disorder    Major depressive disorder, recurrent    Paranoid behavior    Psychosis    Opioid use disorder, severe, on maintenance therapy       Allergy:   Allergies   Allergen Reactions    Elavil [Amitriptyline] Nausea Only        Current Medications:   Current Outpatient Medications   Medication Sig Dispense Refill    ARIPiprazole ER (ABILIFY MAINTENA) 400 MG prefilled syringe IM prefilled syringe Inject 400 mg into the appropriate muscle as directed by prescriber Every 28 (Twenty-Eight) Days. Indications: Schizophrenia 1 each 5    buprenorphine-naloxone (SUBOXONE) 8-2 MG per SL tablet Place 1 tablet under the tongue Daily. Indications: Opioid Use Disorder (Continuation of Therapy)      hydrOXYzine pamoate (VISTARIL) 50 MG capsule Take 1 capsule by mouth 3 (Three) Times a Day As Needed for Anxiety. 90 capsule 2    mirtazapine (Remeron) 15 MG tablet Take 1 tablet by mouth Every Night. Indications: Major Depressive Disorder 30 tablet 2     Current Facility-Administered Medications   Medication Dose Route Frequency Provider Last Rate Last Admin    ARIPiprazole ER (ABILIFY MAINTENA) IM prefilled syringe 400 mg  400 mg Intramuscular Q28 Days Francia Milan APRN   400 mg at 10/23/23 0900       Review of Symptoms:    Review of Systems   Constitutional:  Positive for fatigue.   HENT: Negative.     Eyes: Negative.    Respiratory: Negative.     Cardiovascular: Negative.    Gastrointestinal: Negative.    Musculoskeletal: Negative.    Skin: Negative.    Neurological: Negative.    Psychiatric/Behavioral:  Positive for sleep disturbance and depressed mood. Negative for suicidal ideas. The patient is nervous/anxious.        Objective  "  Physical Exam:   Blood pressure 132/88, pulse 95, temperature 97.6 °F (36.4 °C), temperature source Temporal, height 167.6 cm (65.98\"), weight 82.6 kg (182 lb 3.2 oz), SpO2 98%.  Body mass index is 29.42 kg/m².    Appearance: Well nourished female, appropriately dressed, appears stated age and in no acute distress  Gait, Station, Strength: WNL    Mental Status Exam:   Hygiene:   good  Cooperation:  Cooperative  Eye Contact:  Good  Psychomotor Behavior:  Appropriate  Affect:  Appropriate  Mood: normal  Hopelessness: Denies  Speech:  Normal  Thought Process:  Linear  Thought Content:  Mood congruent  Suicidal:  None  Homicidal:  None  Hallucinations:  None  Delusion:  None  Memory:  Intact  Orientation:  Person, Place, Time, and Situation  Reliability:  fair  Insight:  Fair  Judgement:  Fair and Impaired  Impulse Control:  Fair  Physical/Medical Issues:  No      PHQ-Score Total:  PHQ-9 Total Score: 3           Lab Results:   No visits with results within 1 Month(s) from this visit.   Latest known visit with results is:   Admission on 10/01/2022, Discharged on 10/05/2022   Component Date Value Ref Range Status    QT Interval 10/01/2022 422  ms Final    QTC Interval 10/01/2022 468  ms Final    Glucose 10/05/2022 80  65 - 99 mg/dL Final    BUN 10/05/2022 7  6 - 20 mg/dL Final    Creatinine 10/05/2022 0.83  0.57 - 1.00 mg/dL Final    Sodium 10/05/2022 140  136 - 145 mmol/L Final    Potassium 10/05/2022 5.3 (H)  3.5 - 5.2 mmol/L Final    Slight hemolysis detected by analyzer. Results may be affected.    Chloride 10/05/2022 101  98 - 107 mmol/L Final    CO2 10/05/2022 29.9 (H)  22.0 - 29.0 mmol/L Final    Calcium 10/05/2022 9.6  8.6 - 10.5 mg/dL Final    BUN/Creatinine Ratio 10/05/2022 8.4  7.0 - 25.0 Final    Anion Gap 10/05/2022 9.1  5.0 - 15.0 mmol/L Final    eGFR 10/05/2022 92.1  >60.0 mL/min/1.73 Final    National Kidney Foundation and American Society of Nephrology (ASN) Task Force recommended calculation based on " the Chronic Kidney Disease Epidemiology Collaboration (CKD-EPI) equation refit without adjustment for race.       Assessment & Plan   Diagnoses and all orders for this visit:    1. Schizoaffective disorder, bipolar type (Primary)  -     ARIPiprazole ER (ABILIFY MAINTENA) 400 MG prefilled syringe IM prefilled syringe; Inject 400 mg into the appropriate muscle as directed by prescriber Every 28 (Twenty-Eight) Days. Indications: Schizophrenia  Dispense: 1 each; Refill: 5    2. Generalized anxiety disorder  -     hydrOXYzine pamoate (VISTARIL) 50 MG capsule; Take 1 capsule by mouth 3 (Three) Times a Day As Needed for Anxiety.  Dispense: 90 capsule; Refill: 2  -     mirtazapine (Remeron) 15 MG tablet; Take 1 tablet by mouth Every Night. Indications: Major Depressive Disorder  Dispense: 30 tablet; Refill: 2    3. Insomnia due to mental condition  -     mirtazapine (Remeron) 15 MG tablet; Take 1 tablet by mouth Every Night. Indications: Major Depressive Disorder  Dispense: 30 tablet; Refill: 2    4. Medication management    5. History of substance abuse        -Continue aripiprazole  mg injection every 28 days for schizoaffective disorder  -Continue mirtazapine 15 mg nightly for sleep  -Continue hydroxyzine pamoate 50 mg three times daily as needed for anxiety  -Encouraged patient to begin therapy  -Encouraged patient to attend NA meetings  -Encouraged patient to remain compliant with medications   -LUIS reviewed and appropriate. Patient counseled on use of controlled substances.   -The benefits of a healthy diet and exercise were discussed with patient, especially the positive effects they have on mental health. Patient encouraged to consider lifestyle modification regarding  diet and exercise patterns to maximize results of mental health treatment.  -Reviewed previous available documentation  -Reviewed most recent available labs   -Cintia Issa  reports that she has been smoking cigarettes. She has a 8.00  pack-year smoking history. She has never used smokeless tobacco.. I have educated her on the risk of diseases from using tobacco products such as cancer, COPD, heart disease, reproductive problems, low birth weight, and cataracts. I advised her to quit and she is not willing to quit.  I spent 3  minutes counseling the patient.               Visit Diagnoses:    ICD-10-CM ICD-9-CM   1. Schizoaffective disorder, bipolar type  F25.0 295.70   2. Generalized anxiety disorder  F41.1 300.02   3. Insomnia due to mental condition  F51.05 300.9     327.02   4. Medication management  Z79.899 V58.69   5. History of substance abuse  F19.11 305.93         TREATMENT PLAN/GOALS: Continue supportive psychotherapy efforts and medications as indicated. Treatment and medication options discussed during today's visit. Patient acknowledged and verbally consented to continue with current treatment plan and was educated on the importance of compliance with treatment and follow-up appointments.    MEDICATION ISSUES:    Discussed medication options and treatment plan of prescribed medication as well as the risks, benefits, and side effects including potential falls, possible impaired driving and metabolic adversities among others. Patient is agreeable to call the office with any worsening of symptoms or onset of side effects. Patient is agreeable to call 911 or go to the nearest ER should he/she begin having SI/HI.     MEDS ORDERED DURING VISIT:  New Medications Ordered This Visit   Medications    ARIPiprazole ER (ABILIFY MAINTENA) 400 MG prefilled syringe IM prefilled syringe     Sig: Inject 400 mg into the appropriate muscle as directed by prescriber Every 28 (Twenty-Eight) Days. Indications: Schizophrenia     Dispense:  1 each     Refill:  5    hydrOXYzine pamoate (VISTARIL) 50 MG capsule     Sig: Take 1 capsule by mouth 3 (Three) Times a Day As Needed for Anxiety.     Dispense:  90 capsule     Refill:  2    mirtazapine (Remeron) 15 MG  tablet     Sig: Take 1 tablet by mouth Every Night. Indications: Major Depressive Disorder     Dispense:  30 tablet     Refill:  2       Return in about 3 months (around 2/29/2024), or if symptoms worsen or fail to improve.         Prognosis: Guarded dependent on medication/follow up and treatment plan compliance.  Functionality: pt showing improvements in important areas of daily functioning.     Short-term goals: Patient will adhere to medication regimen and note continued improvement in symptoms over the next 3 months.   Long-term goals: Patient will be adherent to medication management and psychotherapy with continued improvement in symptoms over the next 6 months          This document has been electronically signed by ROBIN Cobb   November 29, 2023 10:05 EST    Part of this note may be an electronic transcription/translation of spoken language to printed text using the Dragon Dictation System.

## 2023-12-18 ENCOUNTER — CLINICAL SUPPORT (OUTPATIENT)
Dept: FAMILY MEDICINE CLINIC | Facility: CLINIC | Age: 38
End: 2023-12-18

## 2023-12-18 DIAGNOSIS — F25.0 SCHIZOAFFECTIVE DISORDER, BIPOLAR TYPE: Primary | ICD-10-CM

## 2023-12-18 NOTE — PROGRESS NOTES
Injection  Injection performed in Left Ventrogluteal by Mariah Kay MA. Patient tolerated the procedure well without complications.  12/18/23   Mariah Kay MA

## 2024-02-13 ENCOUNTER — APPOINTMENT (OUTPATIENT)
Dept: GENERAL RADIOLOGY | Facility: HOSPITAL | Age: 39
End: 2024-02-13
Payer: MEDICAID

## 2024-02-13 ENCOUNTER — HOSPITAL ENCOUNTER (EMERGENCY)
Facility: HOSPITAL | Age: 39
Discharge: HOME | End: 2024-02-13
Attending: STUDENT IN AN ORGANIZED HEALTH CARE EDUCATION/TRAINING PROGRAM | Admitting: STUDENT IN AN ORGANIZED HEALTH CARE EDUCATION/TRAINING PROGRAM
Payer: MEDICAID

## 2024-02-13 VITALS
SYSTOLIC BLOOD PRESSURE: 114 MMHG | WEIGHT: 140 LBS | OXYGEN SATURATION: 99 % | TEMPERATURE: 98.4 F | DIASTOLIC BLOOD PRESSURE: 70 MMHG | HEART RATE: 86 BPM | BODY MASS INDEX: 22.5 KG/M2 | RESPIRATION RATE: 16 BRPM | HEIGHT: 66 IN

## 2024-02-13 DIAGNOSIS — S39.012A LUMBAR STRAIN, INITIAL ENCOUNTER: Primary | ICD-10-CM
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
## 2024-02-13 PROCEDURE — 72110 X-RAY EXAM L-2 SPINE 4/>VWS: CPT | Performed by: RADIOLOGY

## 2024-02-13 PROCEDURE — 72072 X-RAY EXAM THORAC SPINE 3VWS: CPT

## 2024-02-13 PROCEDURE — 25010000002 KETOROLAC TROMETHAMINE PER 15 MG: Performed by: STUDENT IN AN ORGANIZED HEALTH CARE EDUCATION/TRAINING PROGRAM

## 2024-02-13 PROCEDURE — 72072 X-RAY EXAM THORAC SPINE 3VWS: CPT | Performed by: RADIOLOGY

## 2024-02-13 PROCEDURE — 72110 X-RAY EXAM L-2 SPINE 4/>VWS: CPT

## 2024-02-13 PROCEDURE — 99282 EMERGENCY DEPT VISIT SF MDM: CPT

## 2024-02-13 PROCEDURE — 25010000002 ORPHENADRINE CITRATE PER 60 MG: Performed by: STUDENT IN AN ORGANIZED HEALTH CARE EDUCATION/TRAINING PROGRAM

## 2024-02-13 PROCEDURE — 96372 THER/PROPH/DIAG INJ SC/IM: CPT

## 2024-02-13 RX ORDER — KETOROLAC TROMETHAMINE 10 MG/1
10 TABLET, FILM COATED ORAL EVERY 6 HOURS PRN
Qty: 20 TABLET | Refills: 0 | Status: SHIPPED | OUTPATIENT
Start: 2024-02-13

## 2024-02-13 RX ORDER — KETOROLAC TROMETHAMINE 30 MG/ML
60 INJECTION, SOLUTION INTRAMUSCULAR; INTRAVENOUS ONCE
Status: COMPLETED | OUTPATIENT
Start: 2024-02-13 | End: 2024-02-13

## 2024-02-13 RX ORDER — ORPHENADRINE CITRATE 30 MG/ML
60 INJECTION INTRAMUSCULAR; INTRAVENOUS ONCE
Status: COMPLETED | OUTPATIENT
Start: 2024-02-13 | End: 2024-02-13

## 2024-02-13 RX ADMIN — KETOROLAC TROMETHAMINE 60 MG: 60 INJECTION, SOLUTION INTRAMUSCULAR at 12:01

## 2024-02-13 RX ADMIN — ORPHENADRINE CITRATE 60 MG: 60 INJECTION INTRAMUSCULAR; INTRAVENOUS at 12:02

## 2024-02-28 ENCOUNTER — OFFICE VISIT (OUTPATIENT)
Dept: PSYCHIATRY | Facility: CLINIC | Age: 39
End: 2024-02-28

## 2024-02-28 VITALS
HEART RATE: 87 BPM | DIASTOLIC BLOOD PRESSURE: 79 MMHG | HEIGHT: 66 IN | TEMPERATURE: 97.3 F | OXYGEN SATURATION: 96 % | WEIGHT: 179.2 LBS | BODY MASS INDEX: 28.8 KG/M2 | SYSTOLIC BLOOD PRESSURE: 130 MMHG

## 2024-02-28 DIAGNOSIS — F41.1 GENERALIZED ANXIETY DISORDER: ICD-10-CM

## 2024-02-28 DIAGNOSIS — F51.05 INSOMNIA DUE TO MENTAL CONDITION: ICD-10-CM

## 2024-02-28 DIAGNOSIS — F19.11 HISTORY OF SUBSTANCE ABUSE: ICD-10-CM

## 2024-02-28 DIAGNOSIS — F25.0 SCHIZOAFFECTIVE DISORDER, BIPOLAR TYPE: Primary | ICD-10-CM

## 2024-02-28 DIAGNOSIS — Z79.899 MEDICATION MANAGEMENT: ICD-10-CM

## 2024-02-28 RX ORDER — MIRTAZAPINE 15 MG/1
15 TABLET, FILM COATED ORAL NIGHTLY
Qty: 30 TABLET | Refills: 2 | Status: SHIPPED | OUTPATIENT
Start: 2024-02-28

## 2024-02-28 RX ORDER — HYDROXYZINE PAMOATE 50 MG/1
50 CAPSULE ORAL 3 TIMES DAILY PRN
Qty: 90 CAPSULE | Refills: 2 | Status: SHIPPED | OUTPATIENT
Start: 2024-02-28

## 2024-02-28 NOTE — PROGRESS NOTES
Injection  Injection performed in RIGHT VENTROGLUTEAL by Shelley Webster RN. Patient tolerated the procedure well without complications.  02/28/24   Shelley Webster RN

## 2024-02-28 NOTE — PROGRESS NOTES
"Subjective   Cintia Issa is a 38 y.o. female who presents today for follow up    Chief Complaint:  Schizoaffective disorder    History of Present Illness: Patient presents as follow up. She reports medications continue to work well. Denies any side effects. States she has been slightly more depressed, feels it is situational. Rates anxiety and depression 2/10 with 10 being the worst. Reports she remains compliant with Suboxone. She denies any relapse. Reports sleep is good. Reports appetite is good. She denies SI/HI/AVH.    The following portions of the patient's history were reviewed and updated as appropriate: allergies, current medications, past family history, past medical history, past social history, past surgical history and problem list.      Past Medical History:  Past Medical History:   Diagnosis Date    Anxiety     Arthritis     Bronchitis     Chronic back pain     Fibromyalgia     Hypertension     MDD (major depressive disorder), recurrent, severe, with psychosis 06/09/2018    Panic disorder     Psychiatric illness     Schizoaffective disorder     Schizophrenia, paranoid        Social History:  Social History     Socioeconomic History    Marital status: Single    Number of children: 0    Highest education level: High school graduate   Tobacco Use    Smoking status: Every Day     Packs/day: 1.00     Years: 8.00     Additional pack years: 0.00     Total pack years: 8.00     Types: Cigarettes    Smokeless tobacco: Never   Vaping Use    Vaping Use: Never used   Substance and Sexual Activity    Alcohol use: No    Drug use: No     Comment: denies    Sexual activity: Yes     Partners: Male     Comment: reports she hasn't been in \"forever.\"        Family History:  Family History   Problem Relation Age of Onset    Arthritis Mother     Stroke Father     COPD Brother     Diabetes Maternal Grandmother     COPD Maternal Grandfather     Heart disease Maternal Grandfather     Stroke Maternal Grandfather     " Anxiety disorder Neg Hx     Depression Neg Hx     Schizophrenia Neg Hx        Past Surgical History:  Past Surgical History:   Procedure Laterality Date    WISDOM TOOTH EXTRACTION         Problem List:  Patient Active Problem List   Diagnosis    Fibromyalgia    Essential hypertension    Schizoaffective disorder    Major depressive disorder, recurrent    Paranoid behavior    Psychosis    Opioid use disorder, severe, on maintenance therapy       Allergy:   Allergies   Allergen Reactions    Elavil [Amitriptyline] Nausea Only        Current Medications:   Current Outpatient Medications   Medication Sig Dispense Refill    ARIPiprazole ER (ABILIFY MAINTENA) 400 MG prefilled syringe IM prefilled syringe Inject 400 mg into the appropriate muscle as directed by prescriber Every 28 (Twenty-Eight) Days. Indications: Schizophrenia 1 each 5    buprenorphine-naloxone (SUBOXONE) 8-2 MG per SL tablet Place 1 tablet under the tongue Daily. Indications: Opioid Use Disorder (Continuation of Therapy)      hydrOXYzine pamoate (VISTARIL) 50 MG capsule Take 1 capsule by mouth 3 (Three) Times a Day As Needed for Anxiety. 90 capsule 2    mirtazapine (Remeron) 15 MG tablet Take 1 tablet by mouth Every Night. Indications: Major Depressive Disorder 30 tablet 2    ketorolac (TORADOL) 10 MG tablet Take 1 tablet by mouth Every 6 (Six) Hours As Needed for Moderate Pain. (Patient not taking: Reported on 2/28/2024) 20 tablet 0     Current Facility-Administered Medications   Medication Dose Route Frequency Provider Last Rate Last Admin    ARIPiprazole ER (ABILIFY MAINTENA) IM prefilled syringe 400 mg  400 mg Intramuscular Q28 Days Francia Milan APRN   400 mg at 02/28/24 0928       Review of Symptoms:    Review of Systems   Constitutional:  Positive for fatigue.   HENT: Negative.     Eyes: Negative.    Respiratory: Negative.     Cardiovascular: Negative.    Gastrointestinal: Negative.    Musculoskeletal: Negative.    Skin: Negative.   "  Neurological: Negative.    Psychiatric/Behavioral:  Positive for depressed mood. Negative for suicidal ideas. The patient is nervous/anxious.        Objective   Physical Exam:   Blood pressure 130/79, pulse 87, temperature 97.3 °F (36.3 °C), temperature source Temporal, height 167.6 cm (66\"), weight 81.3 kg (179 lb 3.2 oz), SpO2 96%.  Body mass index is 28.92 kg/m².    Appearance: Well nourished female, appropriately dressed, appears stated age and in no acute distress  Gait, Station, Strength: WNL    Mental Status Exam:   Hygiene:   good  Cooperation:  Cooperative  Eye Contact:  Good  Psychomotor Behavior:  Appropriate  Affect:  Appropriate  Mood: normal  Hopelessness: Denies  Speech:  Normal  Thought Process:  Linear  Thought Content:  Mood congruent  Suicidal:  None  Homicidal:  None  Hallucinations:  None  Delusion:  None  Memory:  Intact  Orientation:  Person, Place, Time, and Situation  Reliability:  fair  Insight:  Fair  Judgement:  Fair  Impulse Control:  Fair  Physical/Medical Issues:  No      PHQ-Score Total:  PHQ-9 Total Score: 3           Lab Results:   No visits with results within 1 Month(s) from this visit.   Latest known visit with results is:   Admission on 10/01/2022, Discharged on 10/05/2022   Component Date Value Ref Range Status    QT Interval 10/01/2022 422  ms Final    QTC Interval 10/01/2022 468  ms Final    Glucose 10/05/2022 80  65 - 99 mg/dL Final    BUN 10/05/2022 7  6 - 20 mg/dL Final    Creatinine 10/05/2022 0.83  0.57 - 1.00 mg/dL Final    Sodium 10/05/2022 140  136 - 145 mmol/L Final    Potassium 10/05/2022 5.3 (H)  3.5 - 5.2 mmol/L Final    Slight hemolysis detected by analyzer. Results may be affected.    Chloride 10/05/2022 101  98 - 107 mmol/L Final    CO2 10/05/2022 29.9 (H)  22.0 - 29.0 mmol/L Final    Calcium 10/05/2022 9.6  8.6 - 10.5 mg/dL Final    BUN/Creatinine Ratio 10/05/2022 8.4  7.0 - 25.0 Final    Anion Gap 10/05/2022 9.1  5.0 - 15.0 mmol/L Final    eGFR 10/05/2022 " 92.1  >60.0 mL/min/1.73 Final    National Kidney Foundation and American Society of Nephrology (ASN) Task Force recommended calculation based on the Chronic Kidney Disease Epidemiology Collaboration (CKD-EPI) equation refit without adjustment for race.       Assessment & Plan   Diagnoses and all orders for this visit:    1. Schizoaffective disorder, bipolar type (Primary)  -     ARIPiprazole ER (ABILIFY MAINTENA) 400 MG prefilled syringe IM prefilled syringe; Inject 400 mg into the appropriate muscle as directed by prescriber Every 28 (Twenty-Eight) Days. Indications: Schizophrenia  Dispense: 1 each; Refill: 5    2. Generalized anxiety disorder  -     hydrOXYzine pamoate (VISTARIL) 50 MG capsule; Take 1 capsule by mouth 3 (Three) Times a Day As Needed for Anxiety.  Dispense: 90 capsule; Refill: 2  -     mirtazapine (Remeron) 15 MG tablet; Take 1 tablet by mouth Every Night. Indications: Major Depressive Disorder  Dispense: 30 tablet; Refill: 2    3. Insomnia due to mental condition  -     mirtazapine (Remeron) 15 MG tablet; Take 1 tablet by mouth Every Night. Indications: Major Depressive Disorder  Dispense: 30 tablet; Refill: 2    4. Medication management    5. History of substance abuse        -Continue aripiprazole  mg injection every 28 days for schizoaffective disorder  -Continue mirtazapine 15 mg nightly for sleep  -Continue hydroxyzine pamoate 50 mg three times daily as needed for anxiety  -Encouraged patient to begin therapy  -Encouraged patient to attend NA meetings  -Encouraged patient to remain compliant with medications   -LUIS reviewed and appropriate. Patient counseled on use of controlled substances.   -The benefits of a healthy diet and exercise were discussed with patient, especially the positive effects they have on mental health. Patient encouraged to consider lifestyle modification regarding  diet and exercise patterns to maximize results of mental health treatment.  -Reviewed previous  available documentation  -Reviewed most recent available labs   -Cintia Issa  reports that she has been smoking cigarettes. She has a 8.00 pack-year smoking history. She has never used smokeless tobacco.. I have educated her on the risk of diseases from using tobacco products such as cancer, COPD, heart disease, reproductive problems, low birth weight, and cataracts.   I advised her to quit and she is not willing to quit.  I spent 3  minutes counseling the patient.               Visit Diagnoses:    ICD-10-CM ICD-9-CM   1. Schizoaffective disorder, bipolar type  F25.0 295.70   2. Generalized anxiety disorder  F41.1 300.02   3. Insomnia due to mental condition  F51.05 300.9     327.02   4. Medication management  Z79.899 V58.69   5. History of substance abuse  F19.11 305.93         TREATMENT PLAN/GOALS: Continue supportive psychotherapy efforts and medications as indicated. Treatment and medication options discussed during today's visit. Patient acknowledged and verbally consented to continue with current treatment plan and was educated on the importance of compliance with treatment and follow-up appointments.    MEDICATION ISSUES:    Discussed medication options and treatment plan of prescribed medication as well as the risks, benefits, and side effects including potential falls, possible impaired driving and metabolic adversities among others. Patient is agreeable to call the office with any worsening of symptoms or onset of side effects. Patient is agreeable to call 911 or go to the nearest ER should he/she begin having SI/HI.     MEDS ORDERED DURING VISIT:  New Medications Ordered This Visit   Medications    ARIPiprazole ER (ABILIFY MAINTENA) 400 MG prefilled syringe IM prefilled syringe     Sig: Inject 400 mg into the appropriate muscle as directed by prescriber Every 28 (Twenty-Eight) Days. Indications: Schizophrenia     Dispense:  1 each     Refill:  5    hydrOXYzine pamoate (VISTARIL) 50 MG capsule      Sig: Take 1 capsule by mouth 3 (Three) Times a Day As Needed for Anxiety.     Dispense:  90 capsule     Refill:  2    mirtazapine (Remeron) 15 MG tablet     Sig: Take 1 tablet by mouth Every Night. Indications: Major Depressive Disorder     Dispense:  30 tablet     Refill:  2       Return in about 3 months (around 5/28/2024), or if symptoms worsen or fail to improve.         Prognosis: Guarded dependent on medication/follow up and treatment plan compliance.  Functionality: pt showing improvements in important areas of daily functioning.     Short-term goals: Patient will adhere to medication regimen and note continued improvement in symptoms over the next 3 months.   Long-term goals: Patient will be adherent to medication management and psychotherapy with continued improvement in symptoms over the next 6 months          This document has been electronically signed by ROBIN Cobb   February 28, 2024 11:15 EST    Part of this note may be an electronic transcription/translation of spoken language to printed text using the Dragon Dictation System.

## 2024-04-23 ENCOUNTER — CLINICAL SUPPORT (OUTPATIENT)
Dept: FAMILY MEDICINE CLINIC | Facility: CLINIC | Age: 39
End: 2024-04-23
Payer: MEDICAID

## 2024-04-23 DIAGNOSIS — F25.0 SCHIZOAFFECTIVE DISORDER, BIPOLAR TYPE: Primary | ICD-10-CM

## 2024-04-23 PROCEDURE — 96372 THER/PROPH/DIAG INJ SC/IM: CPT | Performed by: NURSE PRACTITIONER

## 2024-05-29 ENCOUNTER — OFFICE VISIT (OUTPATIENT)
Dept: PSYCHIATRY | Facility: CLINIC | Age: 39
End: 2024-05-29

## 2024-05-29 VITALS
HEART RATE: 108 BPM | WEIGHT: 176.4 LBS | DIASTOLIC BLOOD PRESSURE: 88 MMHG | HEIGHT: 66 IN | TEMPERATURE: 98.6 F | SYSTOLIC BLOOD PRESSURE: 110 MMHG | BODY MASS INDEX: 28.35 KG/M2 | OXYGEN SATURATION: 97 %

## 2024-05-29 DIAGNOSIS — F51.05 INSOMNIA DUE TO MENTAL CONDITION: ICD-10-CM

## 2024-05-29 DIAGNOSIS — Z79.899 MEDICATION MANAGEMENT: ICD-10-CM

## 2024-05-29 DIAGNOSIS — F25.0 SCHIZOAFFECTIVE DISORDER, BIPOLAR TYPE: Primary | ICD-10-CM

## 2024-05-29 DIAGNOSIS — F41.1 GENERALIZED ANXIETY DISORDER: ICD-10-CM

## 2024-05-29 DIAGNOSIS — F19.11 HISTORY OF SUBSTANCE ABUSE: ICD-10-CM

## 2024-05-29 PROCEDURE — 99214 OFFICE O/P EST MOD 30 MIN: CPT | Performed by: NURSE PRACTITIONER

## 2024-05-29 RX ORDER — MIRTAZAPINE 15 MG/1
15 TABLET, FILM COATED ORAL NIGHTLY
Qty: 90 TABLET | Refills: 1 | Status: SHIPPED | OUTPATIENT
Start: 2024-05-29

## 2024-05-29 RX ORDER — HYDROXYZINE PAMOATE 50 MG/1
50 CAPSULE ORAL 3 TIMES DAILY PRN
Qty: 90 CAPSULE | Refills: 3 | Status: SHIPPED | OUTPATIENT
Start: 2024-05-29

## 2024-05-29 RX ORDER — TOPIRAMATE 100 MG/1
100 TABLET, FILM COATED ORAL 2 TIMES DAILY
COMMUNITY

## 2024-05-29 RX ORDER — FAMOTIDINE 20 MG/1
20 TABLET, FILM COATED ORAL 2 TIMES DAILY
COMMUNITY
Start: 2024-02-15

## 2024-05-29 NOTE — PROGRESS NOTES
"Subjective   Cintia Issa is a 38 y.o. female who presents today for follow up    Chief Complaint:  Schizoaffective    History of Present Illness: Patient presents as follow up. Reports injection continues to work well with symptoms. She denies any relapse. She does report recently having headaches, plans to follow up with primary care soon. Rates anxiety 3/10; rates depression 0/10 with 10 being the worst. Reports sleep is good. Reports appetite is good. She denies SI/HI/AVH.    The following portions of the patient's history were reviewed and updated as appropriate: allergies, current medications, past family history, past medical history, past social history, past surgical history and problem list.      Past Medical History:  Past Medical History:   Diagnosis Date    Anxiety     Arthritis     Bronchitis     Chronic back pain     Fibromyalgia     Hypertension     MDD (major depressive disorder), recurrent, severe, with psychosis 06/09/2018    Panic disorder     Psychiatric illness     Schizoaffective disorder     Schizophrenia, paranoid        Social History:  Social History     Socioeconomic History    Marital status: Single    Number of children: 0    Highest education level: High school graduate   Tobacco Use    Smoking status: Every Day     Current packs/day: 1.00     Average packs/day: 1 pack/day for 8.0 years (8.0 ttl pk-yrs)     Types: Cigarettes    Smokeless tobacco: Never   Vaping Use    Vaping status: Never Used   Substance and Sexual Activity    Alcohol use: No    Drug use: No     Comment: denies    Sexual activity: Yes     Partners: Male     Comment: reports she hasn't been in \"forever.\"        Family History:  Family History   Problem Relation Age of Onset    Arthritis Mother     Stroke Father     COPD Brother     Diabetes Maternal Grandmother     COPD Maternal Grandfather     Heart disease Maternal Grandfather     Stroke Maternal Grandfather     Anxiety disorder Neg Hx     Depression Neg Hx  "    Schizophrenia Neg Hx        Past Surgical History:  Past Surgical History:   Procedure Laterality Date    WISDOM TOOTH EXTRACTION         Problem List:  Patient Active Problem List   Diagnosis    Fibromyalgia    Essential hypertension    Schizoaffective disorder    Major depressive disorder, recurrent    Paranoid behavior    Psychosis    Opioid use disorder, severe, on maintenance therapy       Allergy:   Allergies   Allergen Reactions    Elavil [Amitriptyline] Nausea Only        Current Medications:   Current Outpatient Medications   Medication Sig Dispense Refill    ARIPiprazole ER (ABILIFY MAINTENA) 400 MG prefilled syringe IM prefilled syringe Inject 400 mg into the appropriate muscle as directed by prescriber Every 28 (Twenty-Eight) Days. Indications: Schizophrenia 1 each 5    buprenorphine-naloxone (SUBOXONE) 8-2 MG per SL tablet Place 1 tablet under the tongue Daily. Indications: Opioid Use Disorder (Continuation of Therapy)      famotidine (PEPCID) 20 MG tablet Take 1 tablet by mouth 2 (Two) Times a Day.      hydrOXYzine pamoate (VISTARIL) 50 MG capsule Take 1 capsule by mouth 3 (Three) Times a Day As Needed for Anxiety. 90 capsule 3    mirtazapine (Remeron) 15 MG tablet Take 1 tablet by mouth Every Night. Indications: Major Depressive Disorder 90 tablet 1    ketorolac (TORADOL) 10 MG tablet Take 1 tablet by mouth Every 6 (Six) Hours As Needed for Moderate Pain. 20 tablet 0    topiramate (TOPAMAX) 100 MG tablet Take 1 tablet by mouth 2 (Two) Times a Day. (Patient not taking: Reported on 5/29/2024)       Current Facility-Administered Medications   Medication Dose Route Frequency Provider Last Rate Last Admin    ARIPiprazole ER (ABILIFY MAINTENA) IM prefilled syringe 400 mg  400 mg Intramuscular Q28 Days Francia Milan APRN   400 mg at 04/23/24 1118       Review of Symptoms:    Review of Systems   Constitutional:  Positive for fatigue.   HENT: Negative.     Eyes: Negative.    Respiratory: Negative.    "  Cardiovascular: Negative.    Gastrointestinal: Negative.    Musculoskeletal: Negative.    Skin: Negative.    Neurological: Negative.    Psychiatric/Behavioral:  Positive for depressed mood. Negative for suicidal ideas. The patient is nervous/anxious.        Objective   Physical Exam:   Blood pressure 110/88, pulse 108, temperature 98.6 °F (37 °C), temperature source Temporal, height 167.6 cm (66\"), weight 80 kg (176 lb 6.4 oz), SpO2 97%.  Body mass index is 28.47 kg/m².    Appearance: Well nourished female, appropriately dressed, appears stated age and in no acute distress  Gait, Station, Strength: WNL    Mental Status Exam:   Hygiene:   good  Cooperation:  Cooperative  Eye Contact:  Good  Psychomotor Behavior:  Appropriate  Affect:  Appropriate  Mood: normal  Hopelessness: Denies  Speech:  Normal  Thought Process:  Linear  Thought Content:  Mood congruent  Suicidal:  None  Homicidal:  None  Hallucinations:  None  Delusion:  None  Memory:  Intact  Orientation:  Person, Place, Time, and Situation  Reliability:  fair  Insight:  Fair  Judgement:  Fair  Impulse Control:  Fair  Physical/Medical Issues:   see med hx      PHQ-Score Total:  PHQ-9 Total Score:  0        Lab Results:   No visits with results within 1 Month(s) from this visit.   Latest known visit with results is:   Admission on 10/01/2022, Discharged on 10/05/2022   Component Date Value Ref Range Status    QT Interval 10/01/2022 422  ms Final    QTC Interval 10/01/2022 468  ms Final    Glucose 10/05/2022 80  65 - 99 mg/dL Final    BUN 10/05/2022 7  6 - 20 mg/dL Final    Creatinine 10/05/2022 0.83  0.57 - 1.00 mg/dL Final    Sodium 10/05/2022 140  136 - 145 mmol/L Final    Potassium 10/05/2022 5.3 (H)  3.5 - 5.2 mmol/L Final    Slight hemolysis detected by analyzer. Results may be affected.    Chloride 10/05/2022 101  98 - 107 mmol/L Final    CO2 10/05/2022 29.9 (H)  22.0 - 29.0 mmol/L Final    Calcium 10/05/2022 9.6  8.6 - 10.5 mg/dL Final    BUN/Creatinine " Ratio 10/05/2022 8.4  7.0 - 25.0 Final    Anion Gap 10/05/2022 9.1  5.0 - 15.0 mmol/L Final    eGFR 10/05/2022 92.1  >60.0 mL/min/1.73 Final    National Kidney Foundation and American Society of Nephrology (ASN) Task Force recommended calculation based on the Chronic Kidney Disease Epidemiology Collaboration (CKD-EPI) equation refit without adjustment for race.       Assessment & Plan   Diagnoses and all orders for this visit:    1. Schizoaffective disorder, bipolar type (Primary)  -     ARIPiprazole ER (ABILIFY MAINTENA) 400 MG prefilled syringe IM prefilled syringe; Inject 400 mg into the appropriate muscle as directed by prescriber Every 28 (Twenty-Eight) Days. Indications: Schizophrenia  Dispense: 1 each; Refill: 5    2. Generalized anxiety disorder  -     hydrOXYzine pamoate (VISTARIL) 50 MG capsule; Take 1 capsule by mouth 3 (Three) Times a Day As Needed for Anxiety.  Dispense: 90 capsule; Refill: 3  -     mirtazapine (Remeron) 15 MG tablet; Take 1 tablet by mouth Every Night. Indications: Major Depressive Disorder  Dispense: 90 tablet; Refill: 1    3. Insomnia due to mental condition  -     mirtazapine (Remeron) 15 MG tablet; Take 1 tablet by mouth Every Night. Indications: Major Depressive Disorder  Dispense: 90 tablet; Refill: 1    4. Medication management    5. History of substance abuse          -The benefits of a healthy diet and exercise were discussed with patient, especially the positive effects they have on mental health. Patient encouraged to consider lifestyle modification regarding  diet and exercise patterns to maximize results of mental health treatment.  -Reviewed previous available documentation  -Reviewed most recent available labs   -Cintia Issa  reports that she has been smoking cigarettes. She has a 8 pack-year smoking history. She has never used smokeless tobacco. I have educated her on the risk of diseases from using tobacco products such as cancer, COPD, heart disease,  reproductive problems, low birth weight, cataracts, and arterial disease. I advised her to quit and she is not willing to quit. I spent 3  minutes counseling the patient.               Visit Diagnoses:    ICD-10-CM ICD-9-CM   1. Schizoaffective disorder, bipolar type  F25.0 295.70   2. Generalized anxiety disorder  F41.1 300.02   3. Insomnia due to mental condition  F51.05 300.9     327.02   4. Medication management  Z79.899 V58.69   5. History of substance abuse  F19.11 305.93         TREATMENT PLAN/GOALS: Continue supportive psychotherapy efforts and medications as indicated. Treatment and medication options discussed during today's visit. Patient acknowledged and verbally consented to continue with current treatment plan and was educated on the importance of compliance with treatment and follow-up appointments.    MEDICATION ISSUES:    Discussed medication options and treatment plan of prescribed medication as well as the risks, benefits, and side effects including potential falls, possible impaired driving and metabolic adversities among others. Patient is agreeable to call the office with any worsening of symptoms or onset of side effects. Patient is agreeable to call 911 or go to the nearest ER should he/she begin having SI/HI.     MEDS ORDERED DURING VISIT:  New Medications Ordered This Visit   Medications    ARIPiprazole ER (ABILIFY MAINTENA) 400 MG prefilled syringe IM prefilled syringe     Sig: Inject 400 mg into the appropriate muscle as directed by prescriber Every 28 (Twenty-Eight) Days. Indications: Schizophrenia     Dispense:  1 each     Refill:  5    hydrOXYzine pamoate (VISTARIL) 50 MG capsule     Sig: Take 1 capsule by mouth 3 (Three) Times a Day As Needed for Anxiety.     Dispense:  90 capsule     Refill:  3    mirtazapine (Remeron) 15 MG tablet     Sig: Take 1 tablet by mouth Every Night. Indications: Major Depressive Disorder     Dispense:  90 tablet     Refill:  1       Return in about 6 months  (around 11/29/2024), or if symptoms worsen or fail to improve.         Prognosis: Guarded dependent on medication/follow up and treatment plan compliance.  Functionality: pt showing improvements in important areas of daily functioning.     Short-term goals: Patient will adhere to medication regimen and note continued improvement in symptoms over the next 3 months.   Long-term goals: Patient will be adherent to medication management and psychotherapy with continued improvement in symptoms over the next 6 months          This document has been electronically signed by ROBIN Cobb   May 29, 2024 14:09 EDT    Part of this note may be an electronic transcription/translation of spoken language to printed text using the Dragon Dictation System.

## 2024-07-08 ENCOUNTER — CLINICAL SUPPORT (OUTPATIENT)
Dept: FAMILY MEDICINE CLINIC | Facility: CLINIC | Age: 39
End: 2024-07-08

## 2024-07-08 DIAGNOSIS — F33.9 RECURRENT MAJOR DEPRESSIVE DISORDER, REMISSION STATUS UNSPECIFIED: Primary | ICD-10-CM

## 2024-07-08 PROCEDURE — 95115 IMMUNOTHERAPY ONE INJECTION: CPT | Performed by: NURSE PRACTITIONER

## 2024-07-08 NOTE — PROGRESS NOTES
Injection  Injection performed in right deltoid by Melanie Luo MA. Patient tolerated the procedure well without complications.  07/08/24   Melanie Luo MA

## 2024-09-25 ENCOUNTER — CLINICAL SUPPORT (OUTPATIENT)
Dept: FAMILY MEDICINE CLINIC | Facility: CLINIC | Age: 39
End: 2024-09-25

## 2024-09-25 DIAGNOSIS — F25.0 SCHIZOAFFECTIVE DISORDER, BIPOLAR TYPE: Primary | ICD-10-CM

## 2024-09-25 PROCEDURE — 96372 THER/PROPH/DIAG INJ SC/IM: CPT | Performed by: NURSE PRACTITIONER

## 2024-12-03 ENCOUNTER — TELEPHONE (OUTPATIENT)
Dept: FAMILY MEDICINE CLINIC | Facility: CLINIC | Age: 39
End: 2024-12-03

## 2024-12-03 NOTE — TELEPHONE ENCOUNTER
Francia states that the patient is consistent in keeping appointments and getting her shot. The MA can give it.

## 2024-12-03 NOTE — TELEPHONE ENCOUNTER
ARIES IS NEEDING HER ABILIFY SHOT. CALEB WANTED TO BE HERE WHEN SHE GOT THE SHOT BUT SHE DIDN'T NEED AN APPT WITH HER, JUST WANTED TO HER TO COME IN A DAY SHE WAS HERE. ARIES HAS NOT SEEN [facility] BEFORE SO I WANTED TO DOUBLE CHECK WHAT ROUTE [facility] WANTED TO GO BEFORE SCHEDULING HER. SHE HAS AN APPT 1/8/24 TO SEE [facility] FOR THE FIRST TIME BUT SAYS SHE IS NEEDING HER ABILIFY SHOT NOW. SHE HAS THE SHOT WITH HER.

## 2024-12-11 ENCOUNTER — CLINICAL SUPPORT (OUTPATIENT)
Dept: FAMILY MEDICINE CLINIC | Facility: CLINIC | Age: 39
End: 2024-12-11

## 2024-12-11 DIAGNOSIS — F33.9 RECURRENT MAJOR DEPRESSIVE DISORDER, REMISSION STATUS UNSPECIFIED: Primary | ICD-10-CM

## 2024-12-11 PROCEDURE — 96372 THER/PROPH/DIAG INJ SC/IM: CPT | Performed by: NURSE PRACTITIONER

## 2024-12-11 NOTE — PROGRESS NOTES
Injection  Injection performed in left ventrogluteal by Melanie Luo MA. Patient tolerated the procedure well without complications.  12/11/24   Melanie Luo MA

## 2025-01-08 ENCOUNTER — OFFICE VISIT (OUTPATIENT)
Dept: PSYCHIATRY | Facility: CLINIC | Age: 40
End: 2025-01-08

## 2025-01-08 VITALS
BODY MASS INDEX: 27.97 KG/M2 | DIASTOLIC BLOOD PRESSURE: 88 MMHG | WEIGHT: 174 LBS | SYSTOLIC BLOOD PRESSURE: 120 MMHG | HEART RATE: 106 BPM | HEIGHT: 66 IN | OXYGEN SATURATION: 95 %

## 2025-01-08 DIAGNOSIS — F41.1 GENERALIZED ANXIETY DISORDER: ICD-10-CM

## 2025-01-08 DIAGNOSIS — F25.0 SCHIZOAFFECTIVE DISORDER, BIPOLAR TYPE: Primary | ICD-10-CM

## 2025-01-08 DIAGNOSIS — F51.05 INSOMNIA DUE TO MENTAL CONDITION: ICD-10-CM

## 2025-01-08 PROCEDURE — 96372 THER/PROPH/DIAG INJ SC/IM: CPT

## 2025-01-08 RX ORDER — HYDROXYZINE PAMOATE 50 MG/1
50 CAPSULE ORAL 3 TIMES DAILY PRN
Qty: 90 CAPSULE | Refills: 3 | Status: SHIPPED | OUTPATIENT
Start: 2025-01-08 | End: 2025-07-07

## 2025-01-08 RX ORDER — MECLIZINE HYDROCHLORIDE 25 MG/1
25 TABLET ORAL 3 TIMES DAILY PRN
COMMUNITY

## 2025-01-08 RX ORDER — MIRTAZAPINE 15 MG/1
15 TABLET, FILM COATED ORAL NIGHTLY PRN
Qty: 90 TABLET | Refills: 1 | Status: SHIPPED | OUTPATIENT
Start: 2025-01-08 | End: 2025-07-07

## 2025-01-08 RX ORDER — ONDANSETRON 8 MG/1
8 TABLET, FILM COATED ORAL EVERY 8 HOURS PRN
COMMUNITY

## 2025-01-08 NOTE — PROGRESS NOTES
Injection  Injection performed in right ventrogluteal by Melanie Luo MA. Patient tolerated the procedure well without complications.  01/08/25   Melanie Luo MA
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
